# Patient Record
Sex: MALE | Race: WHITE | NOT HISPANIC OR LATINO | Employment: OTHER | URBAN - METROPOLITAN AREA
[De-identification: names, ages, dates, MRNs, and addresses within clinical notes are randomized per-mention and may not be internally consistent; named-entity substitution may affect disease eponyms.]

---

## 2018-07-26 ENCOUNTER — HOSPITAL PATHOLOGY (OUTPATIENT)
Dept: OTHER | Facility: CLINIC | Age: 78
End: 2018-07-26

## 2018-07-27 LAB — COPATH REPORT: NORMAL

## 2021-08-22 ENCOUNTER — HOSPITAL ENCOUNTER (EMERGENCY)
Facility: CLINIC | Age: 81
Discharge: HOME | End: 2021-08-22
Attending: PHYSICIAN ASSISTANT | Admitting: PHYSICIAN ASSISTANT
Payer: MEDICARE

## 2021-08-22 ENCOUNTER — APPOINTMENT (OUTPATIENT)
Dept: CT IMAGING | Facility: CLINIC | Age: 81
End: 2021-08-22
Attending: EMERGENCY MEDICINE
Payer: MEDICARE

## 2021-08-22 VITALS
OXYGEN SATURATION: 95 % | SYSTOLIC BLOOD PRESSURE: 142 MMHG | TEMPERATURE: 97.2 F | DIASTOLIC BLOOD PRESSURE: 93 MMHG | HEART RATE: 118 BPM | RESPIRATION RATE: 28 BRPM

## 2021-08-22 DIAGNOSIS — I48.92 ATRIAL FLUTTER (H): ICD-10-CM

## 2021-08-22 DIAGNOSIS — R07.9 CHEST PAIN: ICD-10-CM

## 2021-08-22 LAB
ALBUMIN SERPL-MCNC: 3.8 G/DL (ref 3.4–5)
ALP SERPL-CCNC: 65 U/L (ref 40–150)
ALT SERPL W P-5'-P-CCNC: 19 U/L (ref 0–70)
ANION GAP SERPL CALCULATED.3IONS-SCNC: 4 MMOL/L (ref 3–14)
AST SERPL W P-5'-P-CCNC: 13 U/L (ref 0–45)
BASOPHILS # BLD MANUAL: 0 10E3/UL (ref 0–0.2)
BASOPHILS NFR BLD MANUAL: 0 %
BILIRUB SERPL-MCNC: 1.3 MG/DL (ref 0.2–1.3)
BUN SERPL-MCNC: 15 MG/DL (ref 7–30)
CALCIUM SERPL-MCNC: 8.8 MG/DL (ref 8.5–10.1)
CHLORIDE BLD-SCNC: 107 MMOL/L (ref 94–109)
CO2 SERPL-SCNC: 28 MMOL/L (ref 20–32)
CREAT SERPL-MCNC: 0.99 MG/DL (ref 0.66–1.25)
EOSINOPHIL # BLD MANUAL: 0.6 10E3/UL (ref 0–0.7)
EOSINOPHIL NFR BLD MANUAL: 4 %
ERYTHROCYTE [DISTWIDTH] IN BLOOD BY AUTOMATED COUNT: 13.5 % (ref 10–15)
GFR SERPL CREATININE-BSD FRML MDRD: 72 ML/MIN/1.73M2
GLUCOSE BLD-MCNC: 136 MG/DL (ref 70–99)
HCT VFR BLD AUTO: 42.5 % (ref 40–53)
HGB BLD-MCNC: 14.4 G/DL (ref 13.3–17.7)
HOLD SPECIMEN: NORMAL
LIPASE SERPL-CCNC: 91 U/L (ref 73–393)
LYMPHOCYTES # BLD MANUAL: 2.2 10E3/UL (ref 0.8–5.3)
LYMPHOCYTES NFR BLD MANUAL: 14 %
MAGNESIUM SERPL-MCNC: 1.8 MG/DL (ref 1.6–2.3)
MCH RBC QN AUTO: 34.8 PG (ref 26.5–33)
MCHC RBC AUTO-ENTMCNC: 33.9 G/DL (ref 31.5–36.5)
MCV RBC AUTO: 103 FL (ref 78–100)
MONOCYTES # BLD MANUAL: 0.5 10E3/UL (ref 0–1.3)
MONOCYTES NFR BLD MANUAL: 3 %
NEUTROPHILS # BLD MANUAL: 12.2 10E3/UL (ref 1.6–8.3)
NEUTROPHILS NFR BLD MANUAL: 79 %
NT-PROBNP SERPL-MCNC: 565 PG/ML (ref 0–1800)
PLAT MORPH BLD: ABNORMAL
PLATELET # BLD AUTO: 205 10E3/UL (ref 150–450)
POTASSIUM BLD-SCNC: 3.9 MMOL/L (ref 3.4–5.3)
PROT SERPL-MCNC: 7.6 G/DL (ref 6.8–8.8)
RBC # BLD AUTO: 4.14 10E6/UL (ref 4.4–5.9)
RBC MORPH BLD: ABNORMAL
SARS-COV-2 RNA RESP QL NAA+PROBE: NEGATIVE
SODIUM SERPL-SCNC: 139 MMOL/L (ref 133–144)
TROPONIN I SERPL-MCNC: <0.015 UG/L (ref 0–0.04)
TSH SERPL DL<=0.005 MIU/L-ACNC: 3.56 MU/L (ref 0.4–4)
WBC # BLD AUTO: 15.4 10E3/UL (ref 4–11)

## 2021-08-22 PROCEDURE — 36415 COLL VENOUS BLD VENIPUNCTURE: CPT | Performed by: PHYSICIAN ASSISTANT

## 2021-08-22 PROCEDURE — 93005 ELECTROCARDIOGRAM TRACING: CPT

## 2021-08-22 PROCEDURE — 250N000013 HC RX MED GY IP 250 OP 250 PS 637: Performed by: PHYSICIAN ASSISTANT

## 2021-08-22 PROCEDURE — 85027 COMPLETE CBC AUTOMATED: CPT | Performed by: PHYSICIAN ASSISTANT

## 2021-08-22 PROCEDURE — 83735 ASSAY OF MAGNESIUM: CPT | Performed by: PHYSICIAN ASSISTANT

## 2021-08-22 PROCEDURE — 83690 ASSAY OF LIPASE: CPT | Performed by: PHYSICIAN ASSISTANT

## 2021-08-22 PROCEDURE — 83880 ASSAY OF NATRIURETIC PEPTIDE: CPT | Performed by: EMERGENCY MEDICINE

## 2021-08-22 PROCEDURE — 84484 ASSAY OF TROPONIN QUANT: CPT | Performed by: PHYSICIAN ASSISTANT

## 2021-08-22 PROCEDURE — C9803 HOPD COVID-19 SPEC COLLECT: HCPCS

## 2021-08-22 PROCEDURE — 80053 COMPREHEN METABOLIC PANEL: CPT | Performed by: PHYSICIAN ASSISTANT

## 2021-08-22 PROCEDURE — 74177 CT ABD & PELVIS W/CONTRAST: CPT

## 2021-08-22 PROCEDURE — 250N000011 HC RX IP 250 OP 636: Performed by: PHYSICIAN ASSISTANT

## 2021-08-22 PROCEDURE — 99285 EMERGENCY DEPT VISIT HI MDM: CPT | Mod: 25

## 2021-08-22 PROCEDURE — 87635 SARS-COV-2 COVID-19 AMP PRB: CPT | Performed by: PHYSICIAN ASSISTANT

## 2021-08-22 PROCEDURE — 250N000009 HC RX 250: Performed by: PHYSICIAN ASSISTANT

## 2021-08-22 PROCEDURE — 84443 ASSAY THYROID STIM HORMONE: CPT | Performed by: PHYSICIAN ASSISTANT

## 2021-08-22 RX ORDER — METOPROLOL TARTRATE 25 MG/1
25 TABLET, FILM COATED ORAL 2 TIMES DAILY
Qty: 60 TABLET | Refills: 0 | Status: ON HOLD | OUTPATIENT
Start: 2021-08-22 | End: 2021-10-04

## 2021-08-22 RX ORDER — IOPAMIDOL 755 MG/ML
500 INJECTION, SOLUTION INTRAVASCULAR ONCE
Status: COMPLETED | OUTPATIENT
Start: 2021-08-22 | End: 2021-08-22

## 2021-08-22 RX ORDER — NITROGLYCERIN 0.4 MG/1
0.4 TABLET SUBLINGUAL EVERY 5 MIN PRN
Status: DISCONTINUED | OUTPATIENT
Start: 2021-08-22 | End: 2021-08-23 | Stop reason: HOSPADM

## 2021-08-22 RX ORDER — ASPIRIN 81 MG/1
162 TABLET, CHEWABLE ORAL ONCE
Status: DISCONTINUED | OUTPATIENT
Start: 2021-08-22 | End: 2021-08-22

## 2021-08-22 RX ADMIN — SODIUM CHLORIDE 90 ML: 9 INJECTION, SOLUTION INTRAVENOUS at 19:40

## 2021-08-22 RX ADMIN — NITROGLYCERIN 0.4 MG: 0.4 TABLET SUBLINGUAL at 18:30

## 2021-08-22 RX ADMIN — IOPAMIDOL 73 ML: 755 INJECTION, SOLUTION INTRAVENOUS at 19:38

## 2021-08-22 ASSESSMENT — ENCOUNTER SYMPTOMS
CHILLS: 1
VOMITING: 0
NAUSEA: 0
PALPITATIONS: 1
ABDOMINAL PAIN: 0
DIARRHEA: 0
SHORTNESS OF BREATH: 0
COUGH: 1
FEVER: 0

## 2021-08-22 NOTE — ED TRIAGE NOTES
Patient presents to the ED reporting chest pain. Began today. Currently rates 3/10. States has been under a lot of stress recently.

## 2021-08-22 NOTE — ED PROVIDER NOTES
Emergency Department Attending Supervision Note  8/22/2021  6:23 PM      I evaluated this patient in conjunction with VAHID Levin      Mark Shultz is a 80 year old male with history of diabetes mellitus, hyperlipidemia, hypertension who presents with epigastric pain, cough and palpitations.       On my exam  General: Patient is awake, alert and interactive when I enter the room  Head: The scalp, face, and head appear normal  Neck: Normal range of motion.   CV: Tachycardic in an irregularly irregular rhythm.  Resp: Lungs are clear without wheezes or rales. No respiratory distress.   GI: Abdomen is soft, no rigidity, guarding, or rebound. No distension. No tenderness to palpation in any quadrant.     MS: Normal tone. Joints grossly normal without effusions. No asymmetric leg swelling, calf or thigh tenderness.    Skin: No rash or lesions noted. Normal capillary refill noted  Neuro: Speech is normal and fluent. Face is symmetric. Moving all extremities.   Psych:  Normal affect.  Appropriate interactions.      Results:  Labs Ordered and Resulted from Time of ED Arrival Up to the Time of Departure from the ED   COMPREHENSIVE METABOLIC PANEL - Abnormal; Notable for the following components:       Result Value    Glucose 136 (*)     All other components within normal limits   CBC WITH PLATELETS AND DIFFERENTIAL - Abnormal; Notable for the following components:    WBC Count 15.4 (*)     RBC Count 4.14 (*)      (*)     MCH 34.8 (*)     All other components within normal limits   DIFFERENTIAL - Abnormal; Notable for the following components:    Absolute Neutrophils 12.2 (*)     All other components within normal limits   TROPONIN I - Normal   LIPASE - Normal   TSH WITH FREE T4 REFLEX - Normal   MAGNESIUM - Normal   COVID-19 VIRUS (CORONAVIRUS) BY PCR - Normal    Narrative:     Testing was performed using the patricia  SARS-CoV-2 & Influenza A/B Assay on the patricia  Shirley  System.  This test should be ordered for the  detection of SARS-COV-2 in individuals who meet SARS-CoV-2 clinical and/or epidemiological criteria. Test performance is unknown in asymptomatic patients.  This test is for in vitro diagnostic use under the FDA EUA for laboratories certified under CLIA to perform moderate and/or high complexity testing. This test has not been FDA cleared or approved.  A negative test does not rule out the presence of PCR inhibitors in the specimen or target RNA in concentration below the limit of detection for the assay. The possibility of a false negative should be considered if the patient's recent exposure or clinical presentation suggests COVID-19.  [facility] are certified under the Clinical Laboratory Improvement Amendments of 1988 (CLIA-88) as qualified to perform moderate and/or high complexity laboratory testing.   NT PROBNP INPATIENT - Normal   CBC WITH PLATELETS & DIFFERENTIAL    Narrative:     The following orders were created for panel order CBC with platelets differential.  Procedure                               Abnormality         Status                     ---------                               -----------         ------                     CBC with platelets and d...[[id number]]  Abnormal            Final result               Manual Differential[[id number]]          Abnormal            Final result                 Please view results for these tests on the individual orders.   EXTRA RED TOP TUBE   EXTRA GREEN TOP (LITHIUM HEPARIN) TUBE   EXTRA GREEN TOP (LITHIUM HEPARIN) TUBE   EXTRA PURPLE TOP TUBE   EXTRA BLOOD BANK PURPLE TOP TUBE   EXTRA BLUE TOP TUBE   PULSE OXIMETRY NURSING   CARDIAC CONTINUOUS MONITORING   PERIPHERAL IV CATHETER   EXTRA TUBE    Narrative:     The following orders were created for panel order Extra Tube ([place] Draw).  Procedure                               Abnormality         Status                     ---------                               -----------         ------                      Extra Red Top Tube[[id number]]                               Final result               Extra Green Top (Lithium...[[id number]]                      Final result               Extra Green Top (Lithium...[[id number]]                      Final result               Extra Purple Top Tube[[id number]]                            Final result               Extra Blood Bank Purple ...[[id number]]                      Final result                 Please view results for these tests on the individual orders.   EXTRA TUBE    Narrative:     The following orders were created for panel order Extra Tube.  Procedure                               Abnormality         Status                     ---------                               -----------         ------                     Extra Blue Top Tube[[id number]]                              Final result                 Please view results for these tests on the individual orders.        My impression   Mark is a 80-year-old gentleman who presents emergency department with several complaints including chest/epigastric pain, shortness of breath and palpitations. Patient was ultimately found to have new onset a flutter with rapid ventricular response. No evidence of acute ischemia however he does have some findings concerning for early heart failure with mild edema on CT study. Patient required oxygen for a brief period of time to maintain his saturations above 90%. Leo discussed on multiple occasions need for admission for further management of his A. fib and likely early congestive heart failure. However he declined on multiple occasions. I also spoke with the patient regarding benefits of admission and my strong recommendation for further medical management of his atrial fibrillation with RVR and pulmonary edema. However the patient on multiple occasions adamantly declined stating that he has to get home to take care of his wife. I offered other means for his wife to  receive care but the patient continued to decline. Mark Shultz has made the decision to leave the current treatment against medical advice.  He has been told that his symptoms may possibly be caused by atrial flutter. He has been informed and understands the inherent risks, including death, permanent disability or death.  He has decided to accept the responsibility for his decision.  He has the capacity to make this informed decision.  He is alert and oriented x 3, understands these instructions, and is able to ambulate.  Mark Shultz and all necessary parties have been advised that they may return at any time for further evaluation or treatment. I will start the patient on Xarelto for anticoagulation and start him on metoprolol for rate control. I advised him to return to the emergency department with any new or worsening symptoms and follow-up very closely with his primary care physician.    Diagnosis    ICD-10-CM    1. Atrial flutter (H)  I48.92    2. Chest pain  R07.9          Arnold Low MD Battista, Christopher Joseph, MD  08/23/21 0009

## 2021-08-22 NOTE — ED PROVIDER NOTES
History   Chief Complaint:  Chest Pain    HPI   Mark Shultz is a 80 year old male with history of diabetes mellitus, hyperlipidemia, hypertension who presents with chest pain pain. Patient states he was sitting in a chair about 25 minutes ago when he developed an intense pain in his lower sternal region that lasted about 5 minutes. The pain is currently a 3/10. He has had some palpitations and pain down his left arm over the last couple days. He does mention that he has been battling a cough for the last couple weeks which he had a chest x-ray at his primary care doctor and it was negative for any lung concerns. He is also experiencing chills. He often gets vertigo and felt it coming on 2 days ago so he took his Meclizine which helped. Today he took 3 aspirin, 1 this morning and 2 after developing pain. He denies change from baseline abdominal pain. He denies fever, shortness of breath, vomiting, nausea, diarrhea, or leg swelling. He denies history of arrhythmia. Patient does note he was recently on antibiotics for a testicular infection.     Review of Systems   Constitutional: Positive for chills. Negative for fever.   Respiratory: Positive for cough. Negative for shortness of breath.    Cardiovascular: Positive for chest pain and palpitations. Negative for leg swelling.   Gastrointestinal: Negative for abdominal pain, diarrhea, nausea and vomiting.   All other systems reviewed and are negative.      Allergies:  Tylenol W/Codeine [Acetaminophen-Codeine]    Medications:  Aspirin 81 mg  Metformin  Lisinopril-hydrochlorothiazide  Zocor  Levothyroxine    Past Medical History:    Benign prostatic hyperplasia  Diabetes mellitus  Hyperlipidemia   Hypertension   Hypothyroidism  Lumbago  Left ventricular hypertrophy  Obstructive sleep apnea   Peripheral neuropathy    Past Surgical History:    Hernia repair  Appendectomy     Family History:    Mother- asthma  Sister- COPD  Brother- diabetes mellitus, thyroid disease,  coronary artery disease   Father- cancer    Social History:  Presents with his wife      Physical Exam     Patient Vitals for the past 24 hrs:   BP Temp Pulse Resp SpO2   08/22/21 2140 (!) 142/93 -- 118 28 95 %   08/22/21 2100 (!) 156/91 -- 101 14 99 %   08/22/21 2030 131/82 -- 71 -- 98 %   08/22/21 2015 137/84 -- 56 -- 96 %   08/22/21 2000 (!) 143/91 -- 91 -- 98 %   08/22/21 1915 -- -- 85 18 96 %   08/22/21 1900 (!) 149/92 -- 87 27 96 %   08/22/21 1850 (!) 145/93 -- 91 21 92 %   08/22/21 1830 (!) 141/90 -- 85 24 96 %   08/22/21 1800 (!) 192/117 -- 105 24 96 %   08/22/21 1756 (!) 192/117 97.2  F (36.2  C) 100 18 96 %       Physical Exam  Constitutional: Pleasant. Cooperative.   Eyes: Pupils equally round and reactive  HENT: Head is normal in appearance. Oropharynx is normal with moist mucus membranes.  Cardiovascular: Regular rate and rhythm and without murmurs.  Respiratory: Normal respiratory effort, lungs are clear bilaterally.  GI: Abdomen is soft, non-tender, non-distended. No guarding, rebound, or rigidity.  Musculoskeletal: No asymmetry of the lower extremities, no tenderness to palpation.   Skin: Normal, without rash.  Neurologic: Cranial nerves grossly intact, normal cognition, no focal deficits. Alert and oriented x 3.   Psychiatric: Normal affect.  Nursing notes and vital signs reviewed.    Emergency Department Course   ECG  ECG taken at 1754, ECG read at 1801  Chest pain    New atrial flutter as compared to prior, dated 6/10/15.  Rate 94 bpm. NV interval * ms. QRS duration 76 ms. QT/QTc 342/427 ms. P-R-T axes 83 -13 67. Atrial flutter with variable AV block with premature ventricular or aberrantly conducted complexes.      Imaging:  CT Chest Pulmonary Embolism w Contrast  1.  No pulmonary embolism. There is enlargement of the central pulmonary arteries compatible with an element of pulmonary arterial hypertension. Mild groundglass and interstitial infiltrates in the lung bases, likely mild  edema.  2.  Prostatic enlargement. There is distention of the urinary bladder with bladder wall trabeculation and small diverticula formation involving the dome compatible with outlet obstruction.   3.  Small fat-containing right inguinal hernia.  As read by Radiology.     Laboratory:  CBC: WBC 15.4 (H), HGB 14.4,   CMP: glucose 136 (H) o/w WNL (Creatinine 0.99)  Lipase: 91     Symptomatic COVID-19 Virus (Coronavirus) by PCR: negative    Troponin (Collected 1814): <0.015     TSH: 3.56    Magnesium: 1.8    BNP: 565    Emergency Department Course:    Reviewed:  I reviewed nursing notes, vitals, past medical history and care everywhere    Assessments:  1805 I obtained history and examined the patient as noted above.   2110 I rechecked the patient and explained findings.     Consults:   1830 I staffed the patient with Dr. Low.     Interventions:  1755: Aspirin 162 mg PO   1830: Nitroglycerin 0.4 mg sublingual       Disposition:  The patient left AMA.    Impression & Plan     Medical Decision Making:  Mark Shultz is a 80 year old male who presents to the ED for evaluation of chest pain, cough, dizziness, and palpitations. Chest pain developed earlier today, prompting presentation to the ED. Remainder of symptoms have been ongoing for the past few days/weeks. See HPI as above for additional details. Vitals and physical exam as above. DDx was broad and included ACS, dissection, PE, PTX, GERD, MSK, PNA, among others. Work up as above. ECG notable for new onset atrial flutter. Chest CT does additionally note some mild edema to bilateral lung bases and BNP returns at 565. Remainder of work up as above. Discussed with patient indication for admission given above work up. Patient adamantly refuses admission, noting that he is sole caregiver for his wife at home. We discussed risks associated with leaving AGAINST MEDICAL ADVICE, including up to death, and patient notes understanding and continues to refuse  admission. In this setting, will initiate patient on metoprolol for rate control and additional control of HTN, as well as Xarelto given Aflutter. Patient understands importance of initiating these medications. He will call his PCP for further evaluation immediately. He will have a very low threshold to return to the emergency department. All questions answered. Patient left emergency department AMA.    Diagnosis:    ICD-10-CM    1. Atrial flutter (H)  I48.92    2. Chest pain  R07.9        Discharge Medications:  Discharge Medication List as of 8/22/2021  9:58 PM      START taking these medications    Details   metoprolol tartrate (LOPRESSOR) 25 MG tablet Take 1 tablet (25 mg) by mouth 2 times daily, Disp-60 tablet, R-0, Local Print      rivaroxaban ANTICOAGULANT (XARELTO ANTICOAGULANT) 20 MG TABS tablet Take 1 tablet (20 mg) by mouth daily (with dinner), Disp-30 tablet, R-0, Local Print             Scribe Disclosure:  I, Johanne Trejo, am serving as a scribe at 5:51 PM on 8/22/2021 to document services personally performed by Leo Beckwith PA-C based on my observations and the provider's statements to me.     This record was created at least in part using electronic voice recognition software, so please excuse any typographical errors.            Leo Beckwith PA-C  08/23/21 0050

## 2021-08-23 LAB
ATRIAL RATE - MUSE: 264 BPM
DIASTOLIC BLOOD PRESSURE - MUSE: NORMAL MMHG
INTERPRETATION ECG - MUSE: NORMAL
P AXIS - MUSE: 83 DEGREES
PR INTERVAL - MUSE: NORMAL MS
QRS DURATION - MUSE: 76 MS
QT - MUSE: 342 MS
QTC - MUSE: 427 MS
R AXIS - MUSE: -13 DEGREES
SYSTOLIC BLOOD PRESSURE - MUSE: NORMAL MMHG
T AXIS - MUSE: 67 DEGREES
VENTRICULAR RATE- MUSE: 94 BPM

## 2021-08-23 NOTE — DISCHARGE INSTRUCTIONS
You are leaving the emergency department against medical advice.  Given your new onset heart arrhythmia, we will start you on a low dose medication to control your heart rate. This medication will also help to decrease your blood pressure. Monitor your blood pressures daily since you will be starting on a new medication.  Your new heart arrhythmia puts you at risk for forming clots, and can therefore increase your risks for stroke. We will start you on a blood thinner called Xarelto. Discontinue your daily aspirin while taking the Xarelto.    HAVE A VERY LOW THRESHOLD TO RETURN TO THE EMERGENCY DEPARTMENT WITH CHEST PAIN OR ANY OTHER CONCERNS.

## 2021-09-25 ENCOUNTER — HOSPITAL ENCOUNTER (EMERGENCY)
Facility: CLINIC | Age: 81
Discharge: HOME | DRG: 177 | End: 2021-09-25
Attending: EMERGENCY MEDICINE | Admitting: EMERGENCY MEDICINE
Payer: MEDICARE

## 2021-09-25 ENCOUNTER — APPOINTMENT (OUTPATIENT)
Dept: GENERAL RADIOLOGY | Facility: CLINIC | Age: 81
DRG: 177 | End: 2021-09-25
Attending: EMERGENCY MEDICINE
Payer: MEDICARE

## 2021-09-25 VITALS
RESPIRATION RATE: 22 BRPM | HEART RATE: 91 BPM | SYSTOLIC BLOOD PRESSURE: 139 MMHG | TEMPERATURE: 98.9 F | OXYGEN SATURATION: 95 % | DIASTOLIC BLOOD PRESSURE: 76 MMHG

## 2021-09-25 DIAGNOSIS — R07.9 CHEST PAIN, UNSPECIFIED TYPE: ICD-10-CM

## 2021-09-25 DIAGNOSIS — I50.9 ACUTE ON CHRONIC CONGESTIVE HEART FAILURE, UNSPECIFIED HEART FAILURE TYPE (H): ICD-10-CM

## 2021-09-25 LAB
ALBUMIN SERPL-MCNC: 3.3 G/DL (ref 3.4–5)
ALBUMIN SERPL-MCNC: 3.4 G/DL (ref 3.4–5)
ALP SERPL-CCNC: 52 U/L (ref 40–150)
ALP SERPL-CCNC: 55 U/L (ref 40–150)
ALT SERPL W P-5'-P-CCNC: 16 U/L (ref 0–70)
ALT SERPL W P-5'-P-CCNC: 16 U/L (ref 0–70)
ANION GAP SERPL CALCULATED.3IONS-SCNC: 4 MMOL/L (ref 3–14)
AST SERPL W P-5'-P-CCNC: 12 U/L (ref 0–45)
AST SERPL W P-5'-P-CCNC: 13 U/L (ref 0–45)
BASOPHILS # BLD AUTO: 0 10E3/UL (ref 0–0.2)
BASOPHILS NFR BLD AUTO: 1 %
BILIRUB DIRECT SERPL-MCNC: 0.3 MG/DL (ref 0–0.2)
BILIRUB SERPL-MCNC: 1.1 MG/DL (ref 0.2–1.3)
BILIRUB SERPL-MCNC: 1.2 MG/DL (ref 0.2–1.3)
BUN SERPL-MCNC: 16 MG/DL (ref 7–30)
CALCIUM SERPL-MCNC: 8.6 MG/DL (ref 8.5–10.1)
CHLORIDE BLD-SCNC: 107 MMOL/L (ref 94–109)
CO2 SERPL-SCNC: 28 MMOL/L (ref 20–32)
CREAT SERPL-MCNC: 1 MG/DL (ref 0.66–1.25)
EOSINOPHIL # BLD AUTO: 0.1 10E3/UL (ref 0–0.7)
EOSINOPHIL NFR BLD AUTO: 1 %
ERYTHROCYTE [DISTWIDTH] IN BLOOD BY AUTOMATED COUNT: 13.7 % (ref 10–15)
GFR SERPL CREATININE-BSD FRML MDRD: 70 ML/MIN/1.73M2
GLUCOSE BLD-MCNC: 86 MG/DL (ref 70–99)
HCT VFR BLD AUTO: 39.4 % (ref 40–53)
HGB BLD-MCNC: 13.2 G/DL (ref 13.3–17.7)
HOLD SPECIMEN: NORMAL
IMM GRANULOCYTES # BLD: 0 10E3/UL
IMM GRANULOCYTES NFR BLD: 0 %
LIPASE SERPL-CCNC: 104 U/L (ref 73–393)
LYMPHOCYTES # BLD AUTO: 2.1 10E3/UL (ref 0.8–5.3)
LYMPHOCYTES NFR BLD AUTO: 26 %
MCH RBC QN AUTO: 33.5 PG (ref 26.5–33)
MCHC RBC AUTO-ENTMCNC: 33.5 G/DL (ref 31.5–36.5)
MCV RBC AUTO: 100 FL (ref 78–100)
MONOCYTES # BLD AUTO: 0.9 10E3/UL (ref 0–1.3)
MONOCYTES NFR BLD AUTO: 11 %
NEUTROPHILS # BLD AUTO: 4.9 10E3/UL (ref 1.6–8.3)
NEUTROPHILS NFR BLD AUTO: 61 %
NRBC # BLD AUTO: 0 10E3/UL
NRBC BLD AUTO-RTO: 0 /100
NT-PROBNP SERPL-MCNC: 1800 PG/ML (ref 0–1800)
PLATELET # BLD AUTO: 163 10E3/UL (ref 150–450)
POTASSIUM BLD-SCNC: 3.7 MMOL/L (ref 3.4–5.3)
PROT SERPL-MCNC: 7.3 G/DL (ref 6.8–8.8)
PROT SERPL-MCNC: 7.3 G/DL (ref 6.8–8.8)
RBC # BLD AUTO: 3.94 10E6/UL (ref 4.4–5.9)
SARS-COV-2 RNA RESP QL NAA+PROBE: POSITIVE
SODIUM SERPL-SCNC: 139 MMOL/L (ref 133–144)
TROPONIN I SERPL-MCNC: 0.01 UG/L (ref 0–0.04)
TROPONIN I SERPL-MCNC: 0.02 UG/L (ref 0–0.04)
WBC # BLD AUTO: 8 10E3/UL (ref 4–11)

## 2021-09-25 PROCEDURE — 83880 ASSAY OF NATRIURETIC PEPTIDE: CPT | Performed by: EMERGENCY MEDICINE

## 2021-09-25 PROCEDURE — 82248 BILIRUBIN DIRECT: CPT | Performed by: EMERGENCY MEDICINE

## 2021-09-25 PROCEDURE — 84484 ASSAY OF TROPONIN QUANT: CPT | Mod: 91 | Performed by: EMERGENCY MEDICINE

## 2021-09-25 PROCEDURE — 80053 COMPREHEN METABOLIC PANEL: CPT | Performed by: EMERGENCY MEDICINE

## 2021-09-25 PROCEDURE — 83690 ASSAY OF LIPASE: CPT | Performed by: EMERGENCY MEDICINE

## 2021-09-25 PROCEDURE — 99285 EMERGENCY DEPT VISIT HI MDM: CPT | Mod: 25

## 2021-09-25 PROCEDURE — 250N000011 HC RX IP 250 OP 636: Performed by: EMERGENCY MEDICINE

## 2021-09-25 PROCEDURE — 36415 COLL VENOUS BLD VENIPUNCTURE: CPT | Performed by: EMERGENCY MEDICINE

## 2021-09-25 PROCEDURE — U0003 INFECTIOUS AGENT DETECTION BY NUCLEIC ACID (DNA OR RNA); SEVERE ACUTE RESPIRATORY SYNDROME CORONAVIRUS 2 (SARS-COV-2) (CORONAVIRUS DISEASE [COVID-19]), AMPLIFIED PROBE TECHNIQUE, MAKING USE OF HIGH THROUGHPUT TECHNOLOGIES AS DESCRIBED BY CMS-2020-01-R: HCPCS | Performed by: EMERGENCY MEDICINE

## 2021-09-25 PROCEDURE — 93005 ELECTROCARDIOGRAM TRACING: CPT

## 2021-09-25 PROCEDURE — 96374 THER/PROPH/DIAG INJ IV PUSH: CPT

## 2021-09-25 PROCEDURE — 84484 ASSAY OF TROPONIN QUANT: CPT | Performed by: EMERGENCY MEDICINE

## 2021-09-25 PROCEDURE — 85025 COMPLETE CBC W/AUTO DIFF WBC: CPT | Performed by: EMERGENCY MEDICINE

## 2021-09-25 PROCEDURE — C9803 HOPD COVID-19 SPEC COLLECT: HCPCS

## 2021-09-25 PROCEDURE — 71045 X-RAY EXAM CHEST 1 VIEW: CPT

## 2021-09-25 RX ORDER — FUROSEMIDE 10 MG/ML
40 INJECTION INTRAMUSCULAR; INTRAVENOUS ONCE
Status: COMPLETED | OUTPATIENT
Start: 2021-09-25 | End: 2021-09-25

## 2021-09-25 RX ORDER — FUROSEMIDE 40 MG
40 TABLET ORAL DAILY
Qty: 2 TABLET | Refills: 0 | Status: SHIPPED | OUTPATIENT
Start: 2021-09-25 | End: 2021-09-28

## 2021-09-25 RX ADMIN — FUROSEMIDE 40 MG: 10 INJECTION, SOLUTION INTRAVENOUS at 03:30

## 2021-09-25 ASSESSMENT — ENCOUNTER SYMPTOMS
FEVER: 0
FATIGUE: 1
VOMITING: 0
DIAPHORESIS: 1
NAUSEA: 0
ABDOMINAL PAIN: 0
BLOOD IN STOOL: 0

## 2021-09-25 NOTE — ED NOTES
"Pt ambulated back to room from bathroom and was SOB, tachypneic,  and tachycardic. PT states, \"ever since I turned 80 years old this happens.\" Oxygen staturations WNL on RA. Frequent cough noted.   "

## 2021-09-25 NOTE — ED TRIAGE NOTES
Pt to ED via EMS from home with c/o upper abd pain/pressure/indigestion and fatigue. Pt reports he woke up feeling feverish and diaphoretic 1 hr ago. EMS reports that pt had similar symptoms 3 weeks ago and was DX with afib. Pt denies CP/SOB/HA/body aches. Chronic cough x 1 year, denies any recent worsening of cough.     IV started PTA. Pt denies pain at this time. Denies current ABD pain.

## 2021-09-25 NOTE — ED PROVIDER NOTES
History   Chief Complaint:  Fatigue and Abdominal Pain       HPI   Mark Shultz is a 81 year old male with history of hypertension and atrial fibrillation, currently on xarelto, who presents with fatigue and chest pressure that started about 3 hours ago. He reports he was laying in bed and developed a L. Sided nonradiating pain that was more sharp.  He reports associated sweating with it. Reports that he had similar symptoms about 3 weeks ago and diagnosed with atrial fibrillation.  At bedside he denies any currently complaints. Denies nausea, vomiting, dyspnea, black/bloody stools, abdominal pain, or measured fever. He reports a chronic nonproductive cough, though states this has been ongoing for over a year. He is COVID vaccinated.  No history sick contacts/blood clots/cancer.    Review of Systems   Constitutional: Positive for diaphoresis (since resolved) and fatigue. Negative for fever.   Cardiovascular: Positive for chest pain (since resolved).   Gastrointestinal: Negative for abdominal pain, blood in stool, nausea and vomiting.   All other systems reviewed and are negative.    Allergies:  Codeine  Shellfish Allergy  Tylenol W/Codeine [Acetaminophen-Codeine]  Iodinated Contrast Media  Prednisone    Medications:  Lopressor  Xarelto  Cozaar  Glucotrol  Synthroid  Antivert  Zocor  Glucophage  Flomax  Tradjenta    Past Medical History:    BPH  Diabetes  Hypertension  Hypothyroid  LVH  Peripheral neuropathy  Atrial fibrillation    Past Surgical History:    Hernia repair  Appendectomy    Family History:    Mother: Asthma  Sister: COPD  Brother: Thyroid disease, type 2 diabetes, CAD  Father: Cancer    Social History:  Patient presents to the ED alone.  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  Denies ETOH/smoking    Physical Exam     Patient Vitals for the past 24 hrs:   BP Temp Temp src Pulse Resp SpO2   09/25/21 0455 -- -- -- 89 14 93 %   09/25/21 0450 (!) 140/96 -- -- -- 23 92 %   09/25/21 0430 (!) 154/91 -- -- 72 -- 92 %    09/25/21 0420 (!) 158/91 -- -- 87 22 94 %   09/25/21 0350 (!) 176/95 -- -- 68 13 90 %   09/25/21 0330 (!) 170/101 -- -- 87 20 94 %   09/25/21 0320 (!) 171/92 -- -- 67 17 92 %   09/25/21 0300 138/89 -- -- 68 -- 92 %   09/25/21 0250 (!) 147/81 -- -- 86 -- --   09/25/21 0240 -- -- -- -- -- 95 %   09/25/21 0230 -- -- -- -- -- 95 %   09/25/21 0220 -- -- -- -- -- 93 %   09/25/21 0215 (!) 151/95 -- -- 80 -- --   09/25/21 0200 (!) 152/96 -- -- 71 -- 93 %   09/25/21 0145 (!) 154/86 -- -- 65 25 95 %   09/25/21 0127 -- -- -- 96 13 90 %   09/25/21 0125 -- -- -- 97 15 94 %   09/25/21 0124 -- -- -- -- -- 93 %   09/25/21 0123 -- -- -- -- -- 94 %   09/25/21 0116 -- -- -- -- -- 95 %   09/25/21 0115 (!) 159/107 -- -- 93 -- --   09/25/21 0114 -- 98.9  F (37.2  C) Oral -- -- 95 %       Physical Exam  Nursing note and vitals reviewed.  Constitutional: Well nourished. Resting comfortably.   Eyes: Conjunctiva normal.  Pupils are equal, round, and reactive to light.   ENT: Nose normal. Mucous membranes pink and moist.    Neck: Normal range of motion.  CVS: Irregular rhythm, rate controlled  Pulmonary: Lungs with bibasilar rales  GI: Abdomen soft. Nontender, nondistended. No rigidity or guarding.    MSK: No calf tenderness; trace LE edema  Neuro: Alert. Follows simple commands.  Skin: Skin is warm and dry. No rash noted.   Psychiatric: Normal affect.       Emergency Department Course   ECG  ECG taken at 0112, ECG read at 0111  Atrial flutter with variable AV block. Left axis deviation. Anteroseptal infarct, age undetermined. Inferior injury pattern. Abnormal ECG.   Rate 92 bpm. OR interval * ms. QRS duration 64 ms. QT/QTc 372/460 ms. P-R-T axes * -34 64.     Imaging:  XR Chest, Portable, G/E 1 view:   Enlarged heart. Mild prominence of the interstitial markings. No judy congestive failure. No focal consolidation, pleural effusion or pneumothorax. As per radiology.    Laboratory:   CBC: WBC: 8.0, HGB: 13.2 (L), PLT: 163    CMP: Glucose 86  o/w WNL (Creatinine: 1.00)    Hepatic Panel: Bilirubin Direct: 0.3 (H), Albumin: 3.3 (L), o/w Negative    Troponin (Collected 0126): <0.015    Troponin (Collected 0403): 0.016    Lipase: 104    BNP: 1,800    Symptomatic COVID-19 PCR: pending    Emergency Department Course:    Reviewed:  I reviewed nursing notes, vitals, past medical history and care everywhere    Assessments:  0217 I obtained history and examined the patient as noted above.     0509 I rechecked the patient and explained findings.     Interventions:  0330 Lasix 40 mg IV    Disposition:  The patient was discharged to home.     Impression & Plan     Medical Decision Making:  Patient is a 81-year-old male presenting with reported chest pain though resolved upon arrival.  He is nontoxic, in no significant distress on arrival.  He underwent an extensive work-up during his time in the ED.  EKG with atrial flutter though no ischemic changes.  Screening troponin negative x2.  He remained chest pain-free during his time in the ED.  Labs reviewed, no evidence to suggest underlying sepsis, profound anemia or significant electrolyte derangement.  BNP is quite elevated and I do have concern for mild CHF exacerbation today.  Chest x-ray with cardiomegaly though no obvious pneumonia or significant fluid overload.  Patient was gently diuresed.  He ambulated without hypoxia or significant work of breathing.  I clinically doubt PE given his history of anticoagulation.  He was tested for COVID-19 during his time in the ED though result pending at time of dispo.  I did offer patient admission with continuation of IV diuresis though he is declining today.  I will order outpatient gentle diuresis for a few days as well as outpatient echo as I do not see a recent echo in the system.  He is agreeable to follow-up closely with his PCP in the next few days.  He is instructed to return for increased work of breathing, chest pain or should symptoms worsen or  change.      Diagnosis:    ICD-10-CM    1. Acute on chronic congestive heart failure, unspecified heart failure type (H)  I50.9    2. Chest pain, unspecified type  R07.9 Echocardiogram Complete       Discharge Medications:  New Prescriptions    FUROSEMIDE (LASIX) 40 MG TABLET    Take 1 tablet (40 mg) by mouth daily for 2 days       Scribe Disclosure:  Ozzy GOLDBERG, am serving as a scribe at 2:18 AM on 9/25/2021 to document services personally performed by Diana Deng DO based on my observations and the provider's statements to me.            Diana Deng DO  09/25/21 [id number]

## 2021-09-27 LAB
ATRIAL RATE - MUSE: 264 BPM
DIASTOLIC BLOOD PRESSURE - MUSE: NORMAL MMHG
INTERPRETATION ECG - MUSE: NORMAL
P AXIS - MUSE: NORMAL DEGREES
PR INTERVAL - MUSE: NORMAL MS
QRS DURATION - MUSE: 64 MS
QT - MUSE: 372 MS
QTC - MUSE: 460 MS
R AXIS - MUSE: -34 DEGREES
SYSTOLIC BLOOD PRESSURE - MUSE: NORMAL MMHG
T AXIS - MUSE: 64 DEGREES
VENTRICULAR RATE- MUSE: 92 BPM

## 2021-09-28 ENCOUNTER — HOSPITAL ENCOUNTER (INPATIENT)
Facility: CLINIC | Age: 81
LOS: 6 days | Discharge: HOME | DRG: 177 | End: 2021-10-04
Attending: EMERGENCY MEDICINE | Admitting: INTERNAL MEDICINE
Payer: MEDICARE

## 2021-09-28 ENCOUNTER — APPOINTMENT (OUTPATIENT)
Dept: GENERAL RADIOLOGY | Facility: CLINIC | Age: 81
DRG: 177 | End: 2021-09-28
Attending: EMERGENCY MEDICINE
Payer: MEDICARE

## 2021-09-28 ENCOUNTER — APPOINTMENT (OUTPATIENT)
Dept: CT IMAGING | Facility: CLINIC | Age: 81
DRG: 177 | End: 2021-09-28
Attending: INTERNAL MEDICINE
Payer: MEDICARE

## 2021-09-28 DIAGNOSIS — E11.69 TYPE 2 DIABETES MELLITUS WITH OTHER SPECIFIED COMPLICATION, WITHOUT LONG-TERM CURRENT USE OF INSULIN (H): Primary | ICD-10-CM

## 2021-09-28 DIAGNOSIS — J12.82 PNEUMONIA DUE TO 2019 NOVEL CORONAVIRUS: ICD-10-CM

## 2021-09-28 DIAGNOSIS — U07.1 PNEUMONIA DUE TO 2019 NOVEL CORONAVIRUS: ICD-10-CM

## 2021-09-28 DIAGNOSIS — U07.1 COVID-19: ICD-10-CM

## 2021-09-28 DIAGNOSIS — I48.20 CHRONIC ATRIAL FIBRILLATION (H): ICD-10-CM

## 2021-09-28 DIAGNOSIS — N17.9 AKI (ACUTE KIDNEY INJURY) (H): ICD-10-CM

## 2021-09-28 LAB
ALBUMIN SERPL-MCNC: 3.5 G/DL (ref 3.4–5)
ALP SERPL-CCNC: 51 U/L (ref 40–150)
ALT SERPL W P-5'-P-CCNC: 36 U/L (ref 0–70)
ANION GAP SERPL CALCULATED.3IONS-SCNC: 9 MMOL/L (ref 3–14)
AST SERPL W P-5'-P-CCNC: 58 U/L (ref 0–45)
BASE EXCESS BLDV CALC-SCNC: 0.2 MMOL/L (ref -7.7–1.9)
BASOPHILS # BLD AUTO: 0 10E3/UL (ref 0–0.2)
BASOPHILS NFR BLD AUTO: 0 %
BILIRUB SERPL-MCNC: 0.7 MG/DL (ref 0.2–1.3)
BUN SERPL-MCNC: 41 MG/DL (ref 7–30)
CALCIUM SERPL-MCNC: 8.9 MG/DL (ref 8.5–10.1)
CHLORIDE BLD-SCNC: 101 MMOL/L (ref 94–109)
CO2 SERPL-SCNC: 25 MMOL/L (ref 20–32)
CREAT SERPL-MCNC: 1.82 MG/DL (ref 0.66–1.25)
EOSINOPHIL # BLD AUTO: 0 10E3/UL (ref 0–0.7)
EOSINOPHIL NFR BLD AUTO: 0 %
ERYTHROCYTE [DISTWIDTH] IN BLOOD BY AUTOMATED COUNT: 13.5 % (ref 10–15)
GFR SERPL CREATININE-BSD FRML MDRD: 34 ML/MIN/1.73M2
GLUCOSE BLD-MCNC: 192 MG/DL (ref 70–99)
GLUCOSE BLDC GLUCOMTR-MCNC: 175 MG/DL (ref 70–99)
GLUCOSE BLDC GLUCOMTR-MCNC: 345 MG/DL (ref 70–99)
HCO3 BLDV-SCNC: 26 MMOL/L (ref 21–28)
HCT VFR BLD AUTO: 44.4 % (ref 40–53)
HGB BLD-MCNC: 15.1 G/DL (ref 13.3–17.7)
HOLD SPECIMEN: NORMAL
HOLD SPECIMEN: NORMAL
IMM GRANULOCYTES # BLD: 0 10E3/UL
IMM GRANULOCYTES NFR BLD: 0 %
LYMPHOCYTES # BLD AUTO: 3.1 10E3/UL (ref 0.8–5.3)
LYMPHOCYTES NFR BLD AUTO: 36 %
MCH RBC QN AUTO: 33.4 PG (ref 26.5–33)
MCHC RBC AUTO-ENTMCNC: 34 G/DL (ref 31.5–36.5)
MCV RBC AUTO: 98 FL (ref 78–100)
MONOCYTES # BLD AUTO: 0.6 10E3/UL (ref 0–1.3)
MONOCYTES NFR BLD AUTO: 7 %
NEUTROPHILS # BLD AUTO: 5 10E3/UL (ref 1.6–8.3)
NEUTROPHILS NFR BLD AUTO: 57 %
NRBC # BLD AUTO: 0 10E3/UL
NRBC BLD AUTO-RTO: 0 /100
NT-PROBNP SERPL-MCNC: 972 PG/ML (ref 0–1800)
O2/TOTAL GAS SETTING VFR VENT: 0 %
OXYHGB MFR BLDV: 55 % (ref 70–75)
PCO2 BLDV: 46 MM HG (ref 40–50)
PH BLDV: 7.36 [PH] (ref 7.32–7.43)
PLATELET # BLD AUTO: 143 10E3/UL (ref 150–450)
PO2 BLDV: 31 MM HG (ref 25–47)
POTASSIUM BLD-SCNC: 3.4 MMOL/L (ref 3.4–5.3)
PROT SERPL-MCNC: 8 G/DL (ref 6.8–8.8)
RBC # BLD AUTO: 4.52 10E6/UL (ref 4.4–5.9)
SODIUM SERPL-SCNC: 135 MMOL/L (ref 133–144)
TROPONIN I SERPL-MCNC: 0.04 UG/L (ref 0–0.04)
WBC # BLD AUTO: 8.8 10E3/UL (ref 4–11)

## 2021-09-28 PROCEDURE — 99223 1ST HOSP IP/OBS HIGH 75: CPT | Mod: AI | Performed by: INTERNAL MEDICINE

## 2021-09-28 PROCEDURE — 99285 EMERGENCY DEPT VISIT HI MDM: CPT | Mod: 25

## 2021-09-28 PROCEDURE — 120N000001 HC R&B MED SURG/OB

## 2021-09-28 PROCEDURE — 250N000013 HC RX MED GY IP 250 OP 250 PS 637: Performed by: INTERNAL MEDICINE

## 2021-09-28 PROCEDURE — 82805 BLOOD GASES W/O2 SATURATION: CPT | Performed by: EMERGENCY MEDICINE

## 2021-09-28 PROCEDURE — 250N000009 HC RX 250: Performed by: INTERNAL MEDICINE

## 2021-09-28 PROCEDURE — 258N000003 HC RX IP 258 OP 636: Performed by: EMERGENCY MEDICINE

## 2021-09-28 PROCEDURE — 80053 COMPREHEN METABOLIC PANEL: CPT | Performed by: EMERGENCY MEDICINE

## 2021-09-28 PROCEDURE — 71250 CT THORAX DX C-: CPT

## 2021-09-28 PROCEDURE — 71045 X-RAY EXAM CHEST 1 VIEW: CPT

## 2021-09-28 PROCEDURE — 36415 COLL VENOUS BLD VENIPUNCTURE: CPT | Performed by: EMERGENCY MEDICINE

## 2021-09-28 PROCEDURE — 250N000013 HC RX MED GY IP 250 OP 250 PS 637: Performed by: EMERGENCY MEDICINE

## 2021-09-28 PROCEDURE — 250N000012 HC RX MED GY IP 250 OP 636 PS 637: Performed by: INTERNAL MEDICINE

## 2021-09-28 PROCEDURE — 84484 ASSAY OF TROPONIN QUANT: CPT | Performed by: EMERGENCY MEDICINE

## 2021-09-28 PROCEDURE — 93005 ELECTROCARDIOGRAM TRACING: CPT

## 2021-09-28 PROCEDURE — 85025 COMPLETE CBC W/AUTO DIFF WBC: CPT | Performed by: EMERGENCY MEDICINE

## 2021-09-28 PROCEDURE — 250N000011 HC RX IP 250 OP 636: Performed by: EMERGENCY MEDICINE

## 2021-09-28 PROCEDURE — 258N000003 HC RX IP 258 OP 636: Performed by: INTERNAL MEDICINE

## 2021-09-28 PROCEDURE — XW033E5 INTRODUCTION OF REMDESIVIR ANTI-INFECTIVE INTO PERIPHERAL VEIN, PERCUTANEOUS APPROACH, NEW TECHNOLOGY GROUP 5: ICD-10-PCS | Performed by: HOSPITALIST

## 2021-09-28 PROCEDURE — 96374 THER/PROPH/DIAG INJ IV PUSH: CPT

## 2021-09-28 PROCEDURE — 83880 ASSAY OF NATRIURETIC PEPTIDE: CPT | Performed by: EMERGENCY MEDICINE

## 2021-09-28 PROCEDURE — 96361 HYDRATE IV INFUSION ADD-ON: CPT

## 2021-09-28 RX ORDER — LOSARTAN POTASSIUM 100 MG/1
100 TABLET ORAL EVERY MORNING
Status: ON HOLD | COMMUNITY
End: 2021-10-04

## 2021-09-28 RX ORDER — AMOXICILLIN 250 MG
1 CAPSULE ORAL 2 TIMES DAILY PRN
Status: DISCONTINUED | OUTPATIENT
Start: 2021-09-28 | End: 2021-10-04 | Stop reason: HOSPADM

## 2021-09-28 RX ORDER — ASPIRIN 81 MG/1
81 TABLET ORAL DAILY
Status: DISCONTINUED | OUTPATIENT
Start: 2021-09-29 | End: 2021-10-04 | Stop reason: HOSPADM

## 2021-09-28 RX ORDER — DEXAMETHASONE SODIUM PHOSPHATE 10 MG/ML
6 INJECTION, SOLUTION INTRAMUSCULAR; INTRAVENOUS ONCE
Status: COMPLETED | OUTPATIENT
Start: 2021-09-28 | End: 2021-09-28

## 2021-09-28 RX ORDER — AMOXICILLIN 250 MG
2 CAPSULE ORAL 2 TIMES DAILY PRN
Status: DISCONTINUED | OUTPATIENT
Start: 2021-09-28 | End: 2021-10-04 | Stop reason: HOSPADM

## 2021-09-28 RX ORDER — ONDANSETRON 2 MG/ML
4 INJECTION INTRAMUSCULAR; INTRAVENOUS EVERY 6 HOURS PRN
Status: DISCONTINUED | OUTPATIENT
Start: 2021-09-28 | End: 2021-10-04 | Stop reason: HOSPADM

## 2021-09-28 RX ORDER — METOPROLOL TARTRATE 25 MG/1
25 TABLET, FILM COATED ORAL 2 TIMES DAILY
Status: DISCONTINUED | OUTPATIENT
Start: 2021-09-28 | End: 2021-10-04

## 2021-09-28 RX ORDER — ONDANSETRON 4 MG/1
4 TABLET, ORALLY DISINTEGRATING ORAL EVERY 6 HOURS PRN
Status: DISCONTINUED | OUTPATIENT
Start: 2021-09-28 | End: 2021-10-04 | Stop reason: HOSPADM

## 2021-09-28 RX ORDER — FAMOTIDINE 20 MG/1
20 TABLET, FILM COATED ORAL DAILY
Status: DISCONTINUED | OUTPATIENT
Start: 2021-09-28 | End: 2021-10-02

## 2021-09-28 RX ORDER — ACETAMINOPHEN 325 MG/1
650 TABLET ORAL EVERY 6 HOURS PRN
Status: DISCONTINUED | OUTPATIENT
Start: 2021-09-28 | End: 2021-10-04 | Stop reason: HOSPADM

## 2021-09-28 RX ORDER — ACETAMINOPHEN 650 MG/1
650 SUPPOSITORY RECTAL EVERY 6 HOURS PRN
Status: DISCONTINUED | OUTPATIENT
Start: 2021-09-28 | End: 2021-10-04 | Stop reason: HOSPADM

## 2021-09-28 RX ORDER — SIMVASTATIN 40 MG
40 TABLET ORAL AT BEDTIME
Status: DISCONTINUED | OUTPATIENT
Start: 2021-09-28 | End: 2021-10-04 | Stop reason: HOSPADM

## 2021-09-28 RX ORDER — BENZONATATE 100 MG/1
100 CAPSULE ORAL 3 TIMES DAILY PRN
Status: DISCONTINUED | OUTPATIENT
Start: 2021-09-28 | End: 2021-10-04 | Stop reason: HOSPADM

## 2021-09-28 RX ORDER — NICOTINE POLACRILEX 4 MG
15-30 LOZENGE BUCCAL
Status: DISCONTINUED | OUTPATIENT
Start: 2021-09-28 | End: 2021-10-04 | Stop reason: HOSPADM

## 2021-09-28 RX ORDER — POLYETHYLENE GLYCOL 3350 17 G/17G
17 POWDER, FOR SOLUTION ORAL DAILY PRN
Status: DISCONTINUED | OUTPATIENT
Start: 2021-09-28 | End: 2021-10-04 | Stop reason: HOSPADM

## 2021-09-28 RX ORDER — DEXAMETHASONE SODIUM PHOSPHATE 4 MG/ML
6 INJECTION, SOLUTION INTRA-ARTICULAR; INTRALESIONAL; INTRAMUSCULAR; INTRAVENOUS; SOFT TISSUE EVERY 24 HOURS
Status: DISCONTINUED | OUTPATIENT
Start: 2021-09-29 | End: 2021-10-04 | Stop reason: HOSPADM

## 2021-09-28 RX ORDER — GUAIFENESIN/DEXTROMETHORPHAN 100-10MG/5
5 SYRUP ORAL EVERY 4 HOURS PRN
Status: DISCONTINUED | OUTPATIENT
Start: 2021-09-28 | End: 2021-10-04 | Stop reason: HOSPADM

## 2021-09-28 RX ORDER — DEXTROSE MONOHYDRATE 25 G/50ML
25-50 INJECTION, SOLUTION INTRAVENOUS
Status: DISCONTINUED | OUTPATIENT
Start: 2021-09-28 | End: 2021-10-04 | Stop reason: HOSPADM

## 2021-09-28 RX ORDER — LIDOCAINE 40 MG/G
CREAM TOPICAL
Status: DISCONTINUED | OUTPATIENT
Start: 2021-09-28 | End: 2021-10-04 | Stop reason: HOSPADM

## 2021-09-28 RX ORDER — METOPROLOL TARTRATE 1 MG/ML
5 INJECTION, SOLUTION INTRAVENOUS EVERY 4 HOURS PRN
Status: DISCONTINUED | OUTPATIENT
Start: 2021-09-28 | End: 2021-10-04 | Stop reason: HOSPADM

## 2021-09-28 RX ORDER — ACETAMINOPHEN 500 MG
1000 TABLET ORAL ONCE
Status: COMPLETED | OUTPATIENT
Start: 2021-09-28 | End: 2021-09-28

## 2021-09-28 RX ADMIN — METOPROLOL TARTRATE 25 MG: 25 TABLET, FILM COATED ORAL at 21:57

## 2021-09-28 RX ADMIN — REMDESIVIR 200 MG: 100 INJECTION, POWDER, LYOPHILIZED, FOR SOLUTION INTRAVENOUS at 19:48

## 2021-09-28 RX ADMIN — FAMOTIDINE 20 MG: 20 TABLET ORAL at 19:50

## 2021-09-28 RX ADMIN — DEXAMETHASONE SODIUM PHOSPHATE 6 MG: 10 INJECTION, SOLUTION INTRAMUSCULAR; INTRAVENOUS at 15:53

## 2021-09-28 RX ADMIN — INSULIN ASPART 1 UNITS: 100 INJECTION, SOLUTION INTRAVENOUS; SUBCUTANEOUS at 20:40

## 2021-09-28 RX ADMIN — SODIUM CHLORIDE 50 ML: 9 INJECTION, SOLUTION INTRAVENOUS at 21:56

## 2021-09-28 RX ADMIN — ACETAMINOPHEN 1000 MG: 500 TABLET, FILM COATED ORAL at 11:31

## 2021-09-28 RX ADMIN — SODIUM CHLORIDE 500 ML: 9 INJECTION, SOLUTION INTRAVENOUS at 14:40

## 2021-09-28 RX ADMIN — SIMVASTATIN 40 MG: 40 TABLET, FILM COATED ORAL at 21:57

## 2021-09-28 RX ADMIN — RIVAROXABAN 15 MG: 15 TABLET, FILM COATED ORAL at 20:40

## 2021-09-28 ASSESSMENT — ACTIVITIES OF DAILY LIVING (ADL)
DOING_ERRANDS_INDEPENDENTLY_DIFFICULTY: NO
FALL_HISTORY_WITHIN_LAST_SIX_MONTHS: NO
DIFFICULTY_COMMUNICATING: NO
DOING_ERRANDS_INDEPENDENTLY_DIFFICULTY: NO
WEAR_GLASSES_OR_BLIND: YES
TOILETING_ISSUES: NO
WALKING_OR_CLIMBING_STAIRS_DIFFICULTY: NO
TOILETING_ISSUES: NO
WALKING_OR_CLIMBING_STAIRS_DIFFICULTY: NO
DOING_ERRANDS_INDEPENDENTLY_DIFFICULTY: NO
DIFFICULTY_COMMUNICATING: NO
DRESSING/BATHING_DIFFICULTY: NO
DRESSING/BATHING_DIFFICULTY: NO
CONCENTRATING,_REMEMBERING_OR_MAKING_DECISIONS_DIFFICULTY: NO
DIFFICULTY_COMMUNICATING: NO
DIFFICULTY_EATING/SWALLOWING: NO
CONCENTRATING,_REMEMBERING_OR_MAKING_DECISIONS_DIFFICULTY: NO
HEARING_DIFFICULTY_OR_DEAF: NO
DIFFICULTY_EATING/SWALLOWING: NO
FALL_HISTORY_WITHIN_LAST_SIX_MONTHS: NO
FALL_HISTORY_WITHIN_LAST_SIX_MONTHS: NO
TOILETING_ISSUES: NO
WEAR_GLASSES_OR_BLIND: YES
DRESSING/BATHING_DIFFICULTY: NO
CONCENTRATING,_REMEMBERING_OR_MAKING_DECISIONS_DIFFICULTY: NO
WEAR_GLASSES_OR_BLIND: YES
DIFFICULTY_EATING/SWALLOWING: NO
ADLS_ACUITY_SCORE: 14
WALKING_OR_CLIMBING_STAIRS_DIFFICULTY: NO

## 2021-09-28 ASSESSMENT — ENCOUNTER SYMPTOMS
SHORTNESS OF BREATH: 1
FATIGUE: 1

## 2021-09-28 ASSESSMENT — MIFFLIN-ST. JEOR
SCORE: 1583.97
SCORE: 1577.62
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
## 2021-09-28 NOTE — ED PROVIDER NOTES
"  History   Chief Complaint:  Covid Concern and Shortness of Breath       HPI   Mark Shultz is a 81 year old male with history of diabetes, hypertension, hyperlipidemia, and hypothyroidism who presents with COVID concern and shortness of breath. The patient reports that he tested positive for COVID on 9/25/2021 at his most recent ED visit. He presented with left-sided non-radiating chest pressure and fatigue at this time. Since this visit, fatigue has continued to progress and he endorses difficulty walking long distances due to shortness of breath. Oxygen saturations have been around 90-93% on his pulse oximeter at home.     Review of Systems   Constitutional: Positive for fatigue.   Respiratory: Positive for shortness of breath.    All other systems reviewed and are negative.      Allergies:  Codeine   Shellfish allergy  Tylenol with codeine    Medications:  Aspirin  Lasix  Glipizide  Levothyroxine  Tradjenta  Lisinopril hydrochlorothiazide  Metformin  Metoprolol  Xarelto  Simvastatin    Past Medical History:    BPH  Diabetes  Hypercholesterolemia  Hypertension  Hypothyroid  Lumbago  LVH  Obstructive sleep apnea  Peripheral neuropathy    Past Surgical History:    Upper abdominal hernia repair  Appendectomy     Family History:    Asthma, mother  COPD, sister  Thyroid disease, brother  Type II diabetes, brother  CAD, brother  Cancer, father    Social History:  Arrives via car  Unaccompanied in ED     Physical Exam     Patient Vitals for the past 24 hrs:   BP Temp Temp src Pulse Resp SpO2 Height Weight   09/28/21 1434 -- -- -- -- -- -- 1.727 m (5' 8\") 89.8 kg (198 lb)   09/28/21 1430 90/65 -- -- 105 -- (!) 88 % -- --   09/28/21 1415 92/63 -- -- 105 -- (!) 86 % -- --   09/28/21 1400 100/69 -- -- 76 -- 97 % -- --   09/28/21 1123 99/77 100  F (37.8  C) Oral 86 28 93 % -- --       Physical Exam  Constitutional: Alert, attentive, GCS 15  HENT:    Nose: Nose normal.    Mouth/Throat: Oropharynx is clear, mucous membranes " are dry  Eyes: EOM are normal, anicteric, conjugate gaze  CV: regular rate and rhythm; no murmurs  Chest: Effort normal and breath sounds clear without wheezing or rales, symmetric bilaterally   GI:  non tender. No distension. No guarding or rebound.    MSK: No LE edema, no tenderness to palpation of BLE.  Neurological: Alert, attentive, moving all extremities equally.   Skin: Skin is warm and dry.    Emergency Department Course   ECG  ECG taken at 1428, ECG read at 1434  Atrial fibrillation with rapid ventricular response  Left axis deviation  Anteroseptal infarct, age undetermined  Abnormal ECG   Previous atrial flutter, no atrial fibrillation as compared to prior, dated 2021.  Rate 112 bpm. KY interval * ms. QRS duration 72 ms. QT/QTc 338/461 ms. P-R-T axes * -56 21.     Imaging:  XR Chest Port 1 View   Final Result   IMPRESSION: Lungs clear. No pneumothorax. Heart size has decreased   slightly since the previous exam. Pulmonary vascularity is within   normal limits. Aortic calcification.      JAMIE BRANCH MD            SYSTEM ID:  [id number]          Laboratory:    CMP: urea nitrogen 41 (H), glucose 192 (H), GFR 34 (L), AST 58 (H) o/w WNL (Creatinine 1.82 (H))     blood gas venous: pH: 7.36, PCO2: 46, PO2: 31, Bicarbonate: 26, Oxyhgb: 55 (L), FIO2 0, base excess 0.2    CBC: WBC 8.8, HGB 15.1,  (L)    Troponin (Collected 1355): 0.039    BNP: 972    Emergency Department Course:    Reviewed:  I reviewed nursing notes, vitals and past medical history    Assessments:  1328 I obtained history and examined the patient as noted above.   1530 I rechecked the patient and explained findings.   1545    I spoke to Dr. Silveira    Interventions:  1131 Tylenol 1000 mg PO    Disposition:  The patient was admitted to the hospital under the care of Dr. Silveira       Impression & Plan     Medical Decision Makin-year-old male past medical history seen for diabetes, hypertension, hyperlipidemia,  hypothyroidism presenting for evaluation of increased shortness of breath this is in the setting of a positive Covid test 3 days ago.  He was initiated on Xarelto and diuretics 1 month ago after he presented here for new onset A. 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  When he was here 3 days ago, they also recommended admission for diuresis however again he declined and was sent home with increased diuretics, this is despite high normal BNP.  Here his labs show an TITUS with a creatinine of 1.82, up from a baseline of 1.  EKG here shows A. fib with ventricular rate in the low 100s, however he was hypoxic into the upper 80s with ambulation.  Chest x-ray is clear, I doubt PE given his anticoagulation use.   certainly could have radiographically silent Covid especially with dehydration and overdiuresis.  Given his somewhat acute on chronic dyspnea on exertion and no hypoxia, will admit to medicine for continued treatment of presumed Covid pneumonia in addition to his TITUS A. Fib/flutter.    Covid-19  Mark Shultz was evaluated during a global COVID-19 pandemic, which necessitated consideration that the patient might be at risk for infection with the SARS-CoV-2 virus that causes COVID-19.   Applicable protocols for evaluation were followed during the patient's care.   COVID-19 was considered as part of the patient's evaluation. The plan for testing is:  a test was obtained during this visit.     Diagnosis:    ICD-10-CM    1. Pneumonia due to 2019 novel coronavirus  U07.1     J12.82    2. Chronic atrial fibrillation (H)  I48.20    3. TITUS (acute kidney injury) (H)  N17.9        Heron Gauthier MD  [employer]  3:51 PM 09/28/21     Scribe Disclosure:  IFeliberto, am serving as a scribe at 1:28 PM on 9/28/2021 to document services personally performed by Heron Gauthier MD based on my observations and the provider's statements to me.            Disha  Heron Allison MD  09/28/21 4220

## 2021-09-28 NOTE — ED TRIAGE NOTES
Patient presents COVID+ with concerns for SOB and cough. Tested positive 9/25. Patient endorses being very fatigued and unable to walk long distances. Checking oxygen at home with pulse oximeter, reading around 90-93%.

## 2021-09-28 NOTE — ED NOTES
"[facility]  ED Nurse Handoff Report    Mark Shultz is a 81 year old male   ED Chief complaint: Covid Concern and Shortness of Breath  . ED Diagnosis:   Final diagnoses:   Pneumonia due to 2019 novel coronavirus   Chronic atrial fibrillation (H)   TITUS (acute kidney injury) (H)     Allergies:   Allergies   Allergen Reactions     Codeine Nausea and Vomiting     Shellfish Allergy      Tylenol W/Codeine [Acetaminophen-Codeine]        Code Status: Full Code  Activity level - Baseline/Home:  Independent. Activity Level - Current:   Stand by Assist. Lift room needed: No. Bariatric: No   Needed: No   Isolation: Yes. Infection:COVID + on 9/25/21    Vital Signs:   Vitals:    09/28/21 1434 09/28/21 1515 09/28/21 1600 09/28/21 1615   BP:  (!) 128/118 (!) 125/116 117/79   Pulse:   102 102   Resp:       Temp:       TempSrc:       SpO2:  97% 92% 92%   Weight: 89.8 kg (198 lb)      Height: 1.727 m (5' 8\")          Cardiac Rhythm:  ,      Pain level:    Patient confused: No. Patient Falls Risk: Yes.   Elimination Status: Has voided   Patient Report - Initial Complaint:Patient presents COVID+ with concerns for SOB and cough. Tested positive 9/25. Patient endorses being very fatigued and unable to walk long distances. Checking oxygen at home with pulse oximeter, reading around 90-93%. . Focused Assessment: Desaturated to 88-89 with activity on room air   Tests Performed: refer to orders. Abnormal Results: refer to results review.   Treatments provided: refer to eMAR  Family Comments: wife admitted   OBS brochure/video discussed/provided to patient:  Yes  ED Medications:   Medications   acetaminophen (TYLENOL) tablet 1,000 mg (1,000 mg Oral Given 9/28/21 1131)   0.9% sodium chloride BOLUS (500 mLs Intravenous New Bag 9/28/21 1440)   dexamethasone PF (DECADRON) injection 6 mg (6 mg Intravenous Given 9/28/21 1553)     Drips infusing:  No  For the majority of the shift, the patient's behavior Green. Interventions " performed were n/a.    Sepsis treatment initiated: No     Patient tested for COVID 19 prior to admission: NO    ED Nurse Name/Phone Number: Rick Thomas RN,   4:25 PM  RECEIVING UNIT ED HANDOFF REVIEW    Above ED Nurse Handoff Report was reviewed: Yes  Reviewed by: Tejas Winston RN on September 28, 2021 at 6:11 PM

## 2021-09-29 ENCOUNTER — APPOINTMENT (OUTPATIENT)
Dept: CARDIOLOGY | Facility: CLINIC | Age: 81
DRG: 177 | End: 2021-09-29
Attending: INTERNAL MEDICINE
Payer: MEDICARE

## 2021-09-29 LAB
ANION GAP SERPL CALCULATED.3IONS-SCNC: 5 MMOL/L (ref 3–14)
ATRIAL RATE - MUSE: 122 BPM
BUN SERPL-MCNC: 37 MG/DL (ref 7–30)
CALCIUM SERPL-MCNC: 8.6 MG/DL (ref 8.5–10.1)
CHLORIDE BLD-SCNC: 105 MMOL/L (ref 94–109)
CO2 SERPL-SCNC: 26 MMOL/L (ref 20–32)
CREAT SERPL-MCNC: 1.26 MG/DL (ref 0.66–1.25)
CRP SERPL-MCNC: 43.7 MG/L (ref 0–8)
DIASTOLIC BLOOD PRESSURE - MUSE: NORMAL MMHG
ERYTHROCYTE [SEDIMENTATION RATE] IN BLOOD BY WESTERGREN METHOD: 13 MM/HR (ref 0–20)
GFR SERPL CREATININE-BSD FRML MDRD: 53 ML/MIN/1.73M2
GLUCOSE BLD-MCNC: 199 MG/DL (ref 70–99)
GLUCOSE BLDC GLUCOMTR-MCNC: 150 MG/DL (ref 70–99)
GLUCOSE BLDC GLUCOMTR-MCNC: 231 MG/DL (ref 70–99)
GLUCOSE BLDC GLUCOMTR-MCNC: 246 MG/DL (ref 70–99)
GLUCOSE BLDC GLUCOMTR-MCNC: 293 MG/DL (ref 70–99)
GLUCOSE BLDC GLUCOMTR-MCNC: 364 MG/DL (ref 70–99)
INTERPRETATION ECG - MUSE: NORMAL
LVEF ECHO: NORMAL
P AXIS - MUSE: NORMAL DEGREES
POTASSIUM BLD-SCNC: 3.8 MMOL/L (ref 3.4–5.3)
PR INTERVAL - MUSE: NORMAL MS
QRS DURATION - MUSE: 72 MS
QT - MUSE: 338 MS
QTC - MUSE: 461 MS
R AXIS - MUSE: -56 DEGREES
SODIUM SERPL-SCNC: 136 MMOL/L (ref 133–144)
SYSTOLIC BLOOD PRESSURE - MUSE: NORMAL MMHG
T AXIS - MUSE: 21 DEGREES
TROPONIN I SERPL-MCNC: <0.015 UG/L (ref 0–0.04)
VENTRICULAR RATE- MUSE: 112 BPM

## 2021-09-29 PROCEDURE — 93308 TTE F-UP OR LMTD: CPT | Mod: 26 | Performed by: INTERNAL MEDICINE

## 2021-09-29 PROCEDURE — 93321 DOPPLER ECHO F-UP/LMTD STD: CPT | Mod: 26 | Performed by: INTERNAL MEDICINE

## 2021-09-29 PROCEDURE — 120N000001 HC R&B MED SURG/OB

## 2021-09-29 PROCEDURE — 258N000003 HC RX IP 258 OP 636: Performed by: INTERNAL MEDICINE

## 2021-09-29 PROCEDURE — 36415 COLL VENOUS BLD VENIPUNCTURE: CPT | Performed by: INTERNAL MEDICINE

## 2021-09-29 PROCEDURE — 250N000012 HC RX MED GY IP 250 OP 636 PS 637: Performed by: INTERNAL MEDICINE

## 2021-09-29 PROCEDURE — 93321 DOPPLER ECHO F-UP/LMTD STD: CPT

## 2021-09-29 PROCEDURE — 250N000011 HC RX IP 250 OP 636: Performed by: INTERNAL MEDICINE

## 2021-09-29 PROCEDURE — 84484 ASSAY OF TROPONIN QUANT: CPT | Performed by: INTERNAL MEDICINE

## 2021-09-29 PROCEDURE — 250N000013 HC RX MED GY IP 250 OP 250 PS 637: Performed by: INTERNAL MEDICINE

## 2021-09-29 PROCEDURE — 86140 C-REACTIVE PROTEIN: CPT | Performed by: INTERNAL MEDICINE

## 2021-09-29 PROCEDURE — 99233 SBSQ HOSP IP/OBS HIGH 50: CPT | Performed by: HOSPITALIST

## 2021-09-29 PROCEDURE — 85652 RBC SED RATE AUTOMATED: CPT | Performed by: INTERNAL MEDICINE

## 2021-09-29 PROCEDURE — 255N000002 HC RX 255 OP 636: Performed by: INTERNAL MEDICINE

## 2021-09-29 PROCEDURE — 250N000009 HC RX 250: Performed by: INTERNAL MEDICINE

## 2021-09-29 PROCEDURE — 250N000013 HC RX MED GY IP 250 OP 250 PS 637: Performed by: HOSPITALIST

## 2021-09-29 PROCEDURE — 80048 BASIC METABOLIC PNL TOTAL CA: CPT | Performed by: INTERNAL MEDICINE

## 2021-09-29 PROCEDURE — 93325 DOPPLER ECHO COLOR FLOW MAPG: CPT | Mod: 26 | Performed by: INTERNAL MEDICINE

## 2021-09-29 RX ORDER — LEVOTHYROXINE SODIUM 112 UG/1
112 TABLET ORAL DAILY
Status: DISCONTINUED | OUTPATIENT
Start: 2021-09-29 | End: 2021-10-04 | Stop reason: HOSPADM

## 2021-09-29 RX ADMIN — METOPROLOL TARTRATE 25 MG: 25 TABLET, FILM COATED ORAL at 09:14

## 2021-09-29 RX ADMIN — SIMVASTATIN 40 MG: 40 TABLET, FILM COATED ORAL at 21:26

## 2021-09-29 RX ADMIN — ASPIRIN 81 MG: 81 TABLET, COATED ORAL at 09:14

## 2021-09-29 RX ADMIN — INSULIN ASPART 1 UNITS: 100 INJECTION, SOLUTION INTRAVENOUS; SUBCUTANEOUS at 09:24

## 2021-09-29 RX ADMIN — FAMOTIDINE 20 MG: 20 TABLET ORAL at 09:15

## 2021-09-29 RX ADMIN — LEVOTHYROXINE SODIUM 112 MCG: 0.11 TABLET ORAL at 13:26

## 2021-09-29 RX ADMIN — INSULIN ASPART 5 UNITS: 100 INJECTION, SOLUTION INTRAVENOUS; SUBCUTANEOUS at 17:45

## 2021-09-29 RX ADMIN — SODIUM CHLORIDE 50 ML: 9 INJECTION, SOLUTION INTRAVENOUS at 17:36

## 2021-09-29 RX ADMIN — RIVAROXABAN 20 MG: 20 TABLET, FILM COATED ORAL at 17:37

## 2021-09-29 RX ADMIN — INSULIN GLARGINE 10 UNITS: 100 INJECTION, SOLUTION SUBCUTANEOUS at 09:14

## 2021-09-29 RX ADMIN — DEXAMETHASONE SODIUM PHOSPHATE 6 MG: 4 INJECTION, SOLUTION INTRAMUSCULAR; INTRAVENOUS at 09:15

## 2021-09-29 RX ADMIN — INSULIN ASPART 2 UNITS: 100 INJECTION, SOLUTION INTRAVENOUS; SUBCUTANEOUS at 13:26

## 2021-09-29 RX ADMIN — HUMAN ALBUMIN MICROSPHERES AND PERFLUTREN 3 ML: 10; .22 INJECTION, SOLUTION INTRAVENOUS at 09:18

## 2021-09-29 RX ADMIN — REMDESIVIR 100 MG: 100 INJECTION, POWDER, LYOPHILIZED, FOR SOLUTION INTRAVENOUS at 17:35

## 2021-09-29 ASSESSMENT — ACTIVITIES OF DAILY LIVING (ADL)
ADLS_ACUITY_SCORE: 6
ADLS_ACUITY_SCORE: 6
ADLS_ACUITY_SCORE: 8
ADLS_ACUITY_SCORE: 6

## 2021-09-29 ASSESSMENT — MIFFLIN-ST. JEOR: SCORE: 1584.88

## 2021-09-29 NOTE — PROGRESS NOTES
[facility]    Hospitalist Progress Note      Assessment & Plan   Mark Shultz is a 81 year old male with PMH including BPH, relatively recent diagnosis of atrial fibrillation/flutter less than 4 weeks ago, sleep apnea, type 2 diabetes, hypertension who presents with cough, malaise, shortness of breath and rapid heart rate.     #Acute hypoxemic respiratory failure secondary to COVID-19 PNA: Patient's wife is currently admitted here with Covid-19 pneumonia as well.  He is vaccinated with [employer] Mukul vaccine.  He has had multiple visits to the ER over the past month including 1 to 3 days prior to admission where he was felt to be slightly hypervolemic and started on Lasix.  In the ER, patient hypoxemic to the mid 80s with low-grade fever.  CT of the chest consistent with Covid-19 pneumonia.  -Continuous pulse oximetry.  Wean oxygen as able.  -Started on remdesivir and dexamethasone therapy.  We will continue.  -prn anti-tussives  -Xarelto for a/c  -Follow inflammatory markers, oxygen needs, etc.  -Maintain precautions.    #Acute kidney injury: Presents with creatinine of 1.82 with BUN of 41, up from creatinine of 1.03 days ago with BUN of 16 at that time.  Was started on diuretics and is also on multiple potentially nephrotoxic agents including HCTZ and losartan  -Renal function improving.  -Hold home hydrochlorothiazide and losartan  -Hold further IV maintenance fluids given Covid-19 pneumonia.  Bolus as needed for low blood pressures.    #Aflutter: Diagnosed a month ago.  We will pursue rate control strategy and treat infection as above.  TTE obtained showed EF 45-50% which was mildly reduced from prior with global hypokinesia.  Denies any chest pain or discomfort.  -Metoprolol 25 mg twice daily  -IV metoprolol 5 mg as needed for rates greater than 120  -Continue Xarelto    #Type 2 diabetes: There is the potential for hyperglycemia in the setting of steroid use.  We will schedule 10  units of Lantus in the morning and have sliding scale available.  Titrate as needed.  Blood sugars seem better this afternoon.     #ABDIEL: Does not use CPAP at home currently but did at 1 point.  Will offer CPAP while here.  This could be part of the reason he is having issues with atrial arrhythmias recently.  Educated  #Hypothyroidism: Resume home Synthroid  #History of hypertension: Due to acute kidney injury will hold HCTZ and losartan.  Continue metoprolol.  # Detectable troponin: 0.039: May be somewhat worse due to acute kidney failure and also demand ischemia in the setting of hypoxic respiratory failure.  Troponin repeat negative.  TTE as above. No chest pain. Continuing aspirin, statin and xarelto.     DVT Prophylaxis: DOAC  Code Status: Full Code  Dispo: Likely several days.     Abe Junior MD  Text Page    Interval History   No events.  Patient feels better than yesterday.  His main complaint is his concern for his wife who is also hospitalized for Covid-19 pneumonia.  He denies any chest pain or discomfort.  Cough is seemingly better.  No worsening shortness of breath.  No nausea or vomiting.    -Data reviewed today: I reviewed all new labs and imaging results over the last 24 hours.     Physical Exam   Temp: 97.7  F (36.5  C) Temp src: Oral BP: 132/87 Pulse: 87   Resp: 20 SpO2: 94 % O2 Device: Nasal cannula Oxygen Delivery: 3 LPM  Vitals:    09/28/21 1434 09/28/21 1830 09/29/21 0505   Weight: 89.8 kg (198 lb) 90.4 kg (199 lb 6.4 oz) 90.5 kg (199 lb 9.6 oz)     Vital Signs with Ranges  Temp:  [97.7  F (36.5  C)-98  F (36.7  C)] 97.7  F (36.5  C)  Pulse:  [] 87  Resp:  [20] 20  BP: ()/() 132/87  SpO2:  [86 %-97 %] 94 %  I/O last 3 completed shifts:  In: -   Out: 100 [Urine:100]    General: Alert, awake, no acute distress.  HEENT: NC/AT, MMM, no oral lesions.    Cardiac: irregular, S1, S2.  No murmurs appreciated.  Pulmonary: Some crackles bilaterally in lower lobes. No wheeze. Nl WOB.  Conversatonal.   Abdomen: soft, non-tender, non-distended.  Bowel Sounds Present.  No guarding.  Extremities: no deformities.  Warm, well perfused.  Skin: no rashes or lesions noted.  Warm and Dry.  Neuro: No focal deficits noted.  Speech clear.  Coordination and strength grossly normal.    Medications     - MEDICATION INSTRUCTIONS -         remdesivir  100 mg Intravenous Q24H    And     sodium chloride 0.9%  50 mL Intravenous Q24H     aspirin  81 mg Oral Daily     dexamethasone  6 mg Intravenous Q24H     famotidine  20 mg Oral Daily     insulin aspart  1-7 Units Subcutaneous TID AC     insulin aspart  1-5 Units Subcutaneous At Bedtime     insulin glargine  10 Units Subcutaneous QAM AC     metoprolol tartrate  25 mg Oral BID     rivaroxaban ANTICOAGULANT  20 mg Oral Daily with supper     simvastatin  40 mg Oral At Bedtime     sodium chloride (PF)  3 mL Intracatheter Q8H       Data   Recent Labs   Lab 09/29/21  0918 09/29/21  0635 09/29/21  0211 09/28/21  1938 09/28/21  1355 09/25/21  0403 09/25/21  0122 09/25/21  0122   WBC  --   --   --   --  8.8  --   --  8.0   HGB  --   --   --   --  15.1  --   --  13.2*   MCV  --   --   --   --  98  --   --  100   PLT  --   --   --   --  143*  --   --  163   NA  --  136  --   --  135  --   --  139   POTASSIUM  --  3.8  --   --  3.4  --   --  3.7   CHLORIDE  --  105  --   --  101  --   --  107   CO2  --  26  --   --  25  --   --  28   BUN  --  37*  --   --  41*  --   --  16   CR  --  1.26*  --   --  1.82*  --   --  1.00   ANIONGAP  --  5  --   --  9  --   --  4   BREANNE  --  8.6  --   --  8.9  --   --  8.6   * 199* 246*   < > 192*  --    < > 86   ALBUMIN  --   --   --   --  3.5  --   --  3.3*  3.4   PROTTOTAL  --   --   --   --  8.0  --   --  7.3  7.3   BILITOTAL  --   --   --   --  0.7  --   --  1.1  1.2   ALKPHOS  --   --   --   --  51  --   --  52  55   ALT  --   --   --   --  36  --   --  16  16   AST  --   --   --   --  58*  --   --  13  12   LIPASE  --   --   --    --   --   --   --  104   TROPONIN  --  <0.015  --   --  0.039 0.016   < > 0.015    < > = values in this interval not displayed.       Recent Results (from the past 24 hour(s))   XR Chest Port 1 View    Narrative    CHEST ONE VIEW PORTABLE 2021 3:01 PM     HISTORY: COVID-19. Shortness of breath.    COMPARISON: 2021.      Impression    IMPRESSION: Lungs clear. No pneumothorax. Heart size has decreased  slightly since the previous exam. Pulmonary vascularity is within  normal limits. Aortic calcification.    JAMIE BRANCH MD         SYSTEM ID:  [id number]   CT Chest w/o Contrast    Narrative    EXAM: CT CHEST W/O CONTRAST  LOCATION: [facility]  DATE/TIME: 2021 9:10 PM    INDICATION: Pneumonia, effusion or abscess suspected, xray done  COMPARISON: Chest x-ray from today, CT 2021  TECHNIQUE: CT chest without IV contrast. Multiplanar reformats were obtained. Dose reduction techniques were used.  CONTRAST: None.    FINDINGS:   LUNGS AND PLEURA: There are now patchy groundglass focal infiltrates predominantly in the peripheral distribution very consistent with COVID related pneumonia. There is no dense consolidation or pleural effusion. Resolution of the mild interstitial edema   seen previously.    MEDIASTINUM/AXILLAE: No lymphadenopathy. No thoracic aortic aneurysm.    CORONARY ARTERY CALCIFICATION: None.    UPPER ABDOMEN: No significant finding.    MUSCULOSKELETAL: Degenerative changes throughout thoracic spine.      Impression    IMPRESSION:   1.  New patchy groundglass infiltrates throughout both lungs very consistent with COVID pneumonia.     Echo Limited   Result Value    LVEF  45-50%    Narrative    [id number]  [id number]  [id number]  [id number]^CAIN^HEATH^[facility]  Echocardiography Laboratory  [address] [place] [address]     Name: STAR BRADLEY  MRN: [id number]  : 1940  Study Date: 2021 08:58 AM  Age:  81 yrs  Gender: Male  Patient Location: [facility]  Reason For Study: Aflutter  Ordering Physician: HEATH BARLOW  Referring Physician: Abe Eng  Performed By: Shannen Ayers RDCS     BSA: 2.0 m2  Height: 68 in  Weight: 199 lb  HR: 85  BP: 132/96 mmHg  ______________________________________________________________________________  Procedure  Limited Portable Echo Adult. Optison (NDC #[id number]) given intravenously.  ______________________________________________________________________________  Interpretation Summary     Left ventricular systolic function is mildly reduced.  The visual ejection fraction is 45-50%.  There is mild global hypokinesia of the left ventricle.  EF mildly decreased compared to 2015. The study was technically difficult.  ______________________________________________________________________________  Left Ventricle  Left ventricular systolic function is mildly reduced. The visual ejection  fraction is 45-50%. There is mild global hypokinesia of the left ventricle.     Right Ventricle  The right ventricle is grossly normal size. The right ventricular systolic  function is normal.     Mitral Valve  There is trace mitral regurgitation.     Tricuspid Valve  No tricuspid regurgitation. Right ventricular systolic pressure could not be  approximated due to inadequate tricuspid regurgitation. IVC diameter <2.1 cm  collapsing >50% with sniff suggests a normal RA pressure of 3 mmHg.     Pericardium  There is no pericardial effusion.     Rhythm  The rhythm was undetermined.  ______________________________________________________________________________  Report approved by: Smith Rupa, MDon 09/29/2021 10:14 AM     ______________________________________________________________________________

## 2021-09-29 NOTE — PLAN OF CARE
End of Shift Summary  For vital signs and complete assessments, please see documentation flowsheets.     Pertinent assessments:A&Ox4, VSS, Tele in place wit A-fib and tachy, afebrile, SOB&MANUEL with activities, O2 2-3LPM NC, dry nonproductive cough, LS diminished. BS, LBM 9/28/21, voids adequately without difficulty. Good appetite. Skin intact.  &345.    Major Shift Events: Admission  Treatment Plan: Remdesivir, decadron, Xarelto, oxygen supplement.   Bedside Nurse: Tejas Winston RN

## 2021-09-29 NOTE — PLAN OF CARE
To Do:  End of Shift Summary  For vital signs and complete assessments, please see documentation flowsheets.     Pertinent assessments: Lung sounds diminished throughout. Pt placed on 4.5 L NC overnight and maintaining O2 in the mid to high 90s. Denies pain. VSS.   Major Shift Events uneventful  Treatment Plan: remdesivir, dexamethasone, xarelto  Bedside Nurse: Magraux Siddiqui RN

## 2021-09-29 NOTE — PLAN OF CARE
End of Shift Summary  For vital signs and complete assessments, please see documentation flowsheets.     Pertinent assessments:  A&O4, up with SBA, Tele A-fib controlled by Tele tech, Lung sounds diminished throughout, MANUEL with activities, on O2 3LPM NC, dry nonproductive cough. Denies pain. Good appetite.     Major Shift Events uneventful  Treatment Plan: remdesivir, dexamethasone, xarelto  Bedside Nurse: Tejas GARCIA RN

## 2021-09-30 LAB
ALBUMIN SERPL-MCNC: 2.8 G/DL (ref 3.4–5)
ALP SERPL-CCNC: 53 U/L (ref 40–150)
ALT SERPL W P-5'-P-CCNC: 36 U/L (ref 0–70)
ANION GAP SERPL CALCULATED.3IONS-SCNC: 6 MMOL/L (ref 3–14)
AST SERPL W P-5'-P-CCNC: 44 U/L (ref 0–45)
BILIRUB SERPL-MCNC: 0.5 MG/DL (ref 0.2–1.3)
BUN SERPL-MCNC: 30 MG/DL (ref 7–30)
CALCIUM SERPL-MCNC: 8.3 MG/DL (ref 8.5–10.1)
CHLORIDE BLD-SCNC: 105 MMOL/L (ref 94–109)
CO2 SERPL-SCNC: 27 MMOL/L (ref 20–32)
CREAT SERPL-MCNC: 0.97 MG/DL (ref 0.66–1.25)
CRP SERPL-MCNC: 18.1 MG/L (ref 0–8)
ERYTHROCYTE [DISTWIDTH] IN BLOOD BY AUTOMATED COUNT: 13.3 % (ref 10–15)
GFR SERPL CREATININE-BSD FRML MDRD: 73 ML/MIN/1.73M2
GLUCOSE BLD-MCNC: 166 MG/DL (ref 70–99)
GLUCOSE BLDC GLUCOMTR-MCNC: 139 MG/DL (ref 70–99)
GLUCOSE BLDC GLUCOMTR-MCNC: 197 MG/DL (ref 70–99)
GLUCOSE BLDC GLUCOMTR-MCNC: 240 MG/DL (ref 70–99)
GLUCOSE BLDC GLUCOMTR-MCNC: 295 MG/DL (ref 70–99)
GLUCOSE BLDC GLUCOMTR-MCNC: 329 MG/DL (ref 70–99)
HCT VFR BLD AUTO: 41.6 % (ref 40–53)
HGB BLD-MCNC: 14.2 G/DL (ref 13.3–17.7)
MCH RBC QN AUTO: 33.3 PG (ref 26.5–33)
MCHC RBC AUTO-ENTMCNC: 34.1 G/DL (ref 31.5–36.5)
MCV RBC AUTO: 97 FL (ref 78–100)
PLATELET # BLD AUTO: 130 10E3/UL (ref 150–450)
POTASSIUM BLD-SCNC: 3.6 MMOL/L (ref 3.4–5.3)
PROT SERPL-MCNC: 7 G/DL (ref 6.8–8.8)
RBC # BLD AUTO: 4.27 10E6/UL (ref 4.4–5.9)
SODIUM SERPL-SCNC: 138 MMOL/L (ref 133–144)
WBC # BLD AUTO: 11.1 10E3/UL (ref 4–11)

## 2021-09-30 PROCEDURE — 250N000013 HC RX MED GY IP 250 OP 250 PS 637: Performed by: INTERNAL MEDICINE

## 2021-09-30 PROCEDURE — 250N000012 HC RX MED GY IP 250 OP 636 PS 637: Performed by: HOSPITALIST

## 2021-09-30 PROCEDURE — 99233 SBSQ HOSP IP/OBS HIGH 50: CPT | Performed by: HOSPITALIST

## 2021-09-30 PROCEDURE — 85014 HEMATOCRIT: CPT | Performed by: HOSPITALIST

## 2021-09-30 PROCEDURE — 120N000001 HC R&B MED SURG/OB

## 2021-09-30 PROCEDURE — 36415 COLL VENOUS BLD VENIPUNCTURE: CPT | Performed by: HOSPITALIST

## 2021-09-30 PROCEDURE — 250N000013 HC RX MED GY IP 250 OP 250 PS 637: Performed by: HOSPITALIST

## 2021-09-30 PROCEDURE — 258N000003 HC RX IP 258 OP 636: Performed by: INTERNAL MEDICINE

## 2021-09-30 PROCEDURE — 250N000009 HC RX 250: Performed by: INTERNAL MEDICINE

## 2021-09-30 PROCEDURE — 82040 ASSAY OF SERUM ALBUMIN: CPT | Performed by: HOSPITALIST

## 2021-09-30 PROCEDURE — 250N000011 HC RX IP 250 OP 636: Performed by: INTERNAL MEDICINE

## 2021-09-30 PROCEDURE — 86140 C-REACTIVE PROTEIN: CPT | Performed by: HOSPITALIST

## 2021-09-30 RX ADMIN — REMDESIVIR 100 MG: 100 INJECTION, POWDER, LYOPHILIZED, FOR SOLUTION INTRAVENOUS at 18:22

## 2021-09-30 RX ADMIN — LEVOTHYROXINE SODIUM 112 MCG: 0.11 TABLET ORAL at 08:39

## 2021-09-30 RX ADMIN — METOPROLOL TARTRATE 25 MG: 25 TABLET, FILM COATED ORAL at 22:24

## 2021-09-30 RX ADMIN — DEXAMETHASONE SODIUM PHOSPHATE 6 MG: 4 INJECTION, SOLUTION INTRAMUSCULAR; INTRAVENOUS at 08:39

## 2021-09-30 RX ADMIN — METOPROLOL TARTRATE 25 MG: 25 TABLET, FILM COATED ORAL at 08:40

## 2021-09-30 RX ADMIN — SIMVASTATIN 40 MG: 40 TABLET, FILM COATED ORAL at 22:23

## 2021-09-30 RX ADMIN — INSULIN ASPART 2 UNITS: 100 INJECTION, SOLUTION INTRAVENOUS; SUBCUTANEOUS at 13:40

## 2021-09-30 RX ADMIN — RIVAROXABAN 20 MG: 20 TABLET, FILM COATED ORAL at 18:25

## 2021-09-30 RX ADMIN — INSULIN ASPART 4 UNITS: 100 INJECTION, SOLUTION INTRAVENOUS; SUBCUTANEOUS at 18:34

## 2021-09-30 RX ADMIN — ASPIRIN 81 MG: 81 TABLET, COATED ORAL at 08:39

## 2021-09-30 RX ADMIN — FAMOTIDINE 20 MG: 20 TABLET ORAL at 08:39

## 2021-09-30 RX ADMIN — INSULIN GLARGINE 14 UNITS: 100 INJECTION, SOLUTION SUBCUTANEOUS at 09:15

## 2021-09-30 ASSESSMENT — MIFFLIN-ST. JEOR: SCORE: 1577.17

## 2021-09-30 ASSESSMENT — ACTIVITIES OF DAILY LIVING (ADL)
ADLS_ACUITY_SCORE: 8
ADLS_ACUITY_SCORE: 8
ADLS_ACUITY_SCORE: 4
ADLS_ACUITY_SCORE: 8

## 2021-09-30 NOTE — PLAN OF CARE
Summary:   COVID-19: POSITIVE (9/25)  Admitting Diagnosis: TITUS, PNA   Pertinent PMH: DM2, HTN, HLD, BPH, Hypothyroidism, ABDIEL, Peripheral Neuropathy , Pt. Is on 3L oxygen.  Mobility: SBA   Diet: Regular     Consults: none   LDAs: PIV   Alarms/Safety: Bed Alarm  Pertinent Labs/Imaging: Creat 1.82,   Isolation Status: Special Precautions-COVID-19   Telemetry: Yes    Expected Discharge Date/Time: TBD   Transportation: TBD   Discharge Disposition: Home   Current Living Situation: Home with Spouse (who is in Room 523)  Bed side nurse: Chana Zafar

## 2021-09-30 NOTE — PROGRESS NOTES
[facility]    Hospitalist Progress Note      Assessment & Plan   Mark Shultz is a 81 year old male with PMH including BPH, relatively recent diagnosis of atrial fibrillation/flutter less than 4 weeks ago, sleep apnea, type 2 diabetes, hypertension who presents with cough, malaise, shortness of breath and rapid heart rate.      #Acute hypoxemic respiratory failure secondary to COVID-19 PNA: Patient's wife is currently admitted here with Covid-19 pneumonia as well.  He is vaccinated with [employer]ul [employer] Mukul vaccine.  He has had multiple visits to the ER over the past month including 1 to 3 days prior to admission where he was felt to be slightly hypervolemic and started on Lasix.  In the ER, patient hypoxemic to the mid 80s with low-grade fever.  CT of the chest consistent with Covid-19 pneumonia.  -Continuous pulse oximetry.  Wean oxygen as able.  -Started on remdesivir and dexamethasone therapy.  We will continue.  -prn anti-tussives  -Xarelto for a/c  -Follow inflammatory markers, oxygen needs, etc.    -Maintain precautions.     #Acute kidney injury: Resolved.  Presents with creatinine of 1.82 with BUN of 41, up from creatinine of 1.03 days ago with BUN of 16 at that time.  Was started on diuretics and is also on multiple potentially nephrotoxic agents including HCTZ and losartan  -Renal function improving.  -Hold home hydrochlorothiazide and losartan  -Hold further IV maintenance fluids given Covid-19 pneumonia.  Bolus as needed for low blood pressures.     #Aflutter, afib: Diagnosed a month ago.  We will pursue rate control strategy and treat infection as above.  TTE obtained showed EF 45-50% which was mildly reduced from prior with global hypokinesia.  Denies any chest pain or discomfort.  -Metoprolol 25 mg twice daily  -IV metoprolol 5 mg as needed for rates greater than 120  -Continue Xarelto     #Type 2 diabetes: There is the potential for hyperglycemia in the setting of steroid use.   Increasing lantus to 14 unit daily given hyperglycemia.  Hypoglycemia protocol and sliding scale insulin.       #ABDIEL: Does not use CPAP at home currently but did at 1 point.  Will offer CPAP while here.  This could be part of the reason he is having issues with atrial arrhythmias recently.  Educated  #Hypothyroidism: Resume home Synthroid  #History of hypertension: Due to acute kidney injury will hold HCTZ and losartan.  Continue metoprolol.  # Detectable troponin: 0.039: May be somewhat worse due to acute kidney failure and also demand ischemia in the setting of hypoxic respiratory failure.  Troponin repeat negative.  TTE as above. No chest pain. Continuing aspirin, statin and xarelto.      DVT Prophylaxis: DOAC  Code Status: Full Code  Dispo: Likely several days.     Abe Junior MD  Text Page    Interval History   No events.  Patient again expressing that his chief concern is his wife who is down the [place].  He tells me that she is being discharged and he is appealing given nobody able to take care of her.  He states that if she is discharged he would likely leave AGAINST MEDICAL ADVICE.  I did advise him of the risks of leaving the hospital with Covid-19 pneumonia including worsening respiratory status and possible death.  I did tell him that oftentimes people have worsening respiratory status with Covid-19 pneumonia and need increased oxygen.  If he is at home this cannot not be closely monitor the way it needs to be.  He did voice understanding of this and wants to stay in the hospital but if his wife goes home he is thinking he will leave AMA.    He denies any chest pain or worsening shortness of breath.  No worsening cough.  No nausea vomiting or abdominal pain.    -Data reviewed today: I reviewed all new labs and imaging results over the last 24 hours.    Physical Exam   Temp: 98.2  F (36.8  C) Temp src: Oral BP: 112/81 Pulse: 90   Resp: 20 SpO2: 93 % O2 Device: Nasal cannula Oxygen Delivery: 3 LPM  Vitals:     09/28/21 1830 09/29/21 0505 09/30/21 0623   Weight: 90.4 kg (199 lb 6.4 oz) 90.5 kg (199 lb 9.6 oz) 89.8 kg (197 lb 14.4 oz)     Vital Signs with Ranges  Temp:  [97.9  F (36.6  C)-98.8  F (37.1  C)] 98.2  F (36.8  C)  Pulse:  [76-91] 90  Resp:  [16-20] 20  BP: ()/(52-81) 112/81  SpO2:  [84 %-95 %] 93 %  I/O last 3 completed shifts:  In: 240 [P.O.:240]  Out: 200 [Urine:200]    General: Alert, awake, no acute distress.  HEENT: NC/AT, MMM, no oral lesions.    Cardiac: irregular, S1, S2.  No murmurs appreciated.  Pulmonary: Some crackles bilaterally in lower lobes. No wheeze. Nl WOB. Conversational.   Abdomen: soft, non-tender, non-distended.   Extremities: no deformities.  Warm, well perfused.  Skin: no rashes or lesions noted.  Warm and Dry.  Neuro: No focal deficits noted.  Speech clear.  Coordination and strength grossly normal.    Medications     - MEDICATION INSTRUCTIONS -         remdesivir  100 mg Intravenous Q24H    And     sodium chloride 0.9%  50 mL Intravenous Q24H     aspirin  81 mg Oral Daily     dexamethasone  6 mg Intravenous Q24H     famotidine  20 mg Oral Daily     insulin aspart  1-7 Units Subcutaneous TID AC     insulin aspart  1-5 Units Subcutaneous At Bedtime     insulin glargine  14 Units Subcutaneous QAM AC     levothyroxine  112 mcg Oral Daily     metoprolol tartrate  25 mg Oral BID     rivaroxaban ANTICOAGULANT  20 mg Oral Daily with supper     simvastatin  40 mg Oral At Bedtime     sodium chloride (PF)  3 mL Intracatheter Q8H       Data   Recent Labs   Lab 09/30/21  0912 09/30/21  0808 09/30/21  0203 09/29/21  0918 09/29/21  0635 09/28/21  1938 09/28/21  1355 09/25/21  0403 09/25/21  0122 09/25/21  0122   WBC  --  11.1*  --   --   --   --  8.8  --   --  8.0   HGB  --  14.2  --   --   --   --  15.1  --   --  13.2*   MCV  --  97  --   --   --   --  98  --   --  100   PLT  --  130*  --   --   --   --  143*  --   --  163   NA  --  138  --   --  136  --  135  --    < > 139   POTASSIUM  --   3.6  --   --  3.8  --  3.4  --    < > 3.7   CHLORIDE  --  105  --   --  105  --  101  --    < > 107   CO2  --  27  --   --  26  --  25  --    < > 28   BUN  --  30  --   --  37*  --  41*  --    < > 16   CR  --  0.97  --   --  1.26*  --  1.82*  --    < > 1.00   ANIONGAP  --  6  --   --  5  --  9  --    < > 4   BREANNE  --  8.3*  --   --  8.6  --  8.9  --    < > 8.6   * 166* 240*   < > 199*   < > 192*  --    < > 86   ALBUMIN  --  2.8*  --   --   --   --  3.5  --    < > 3.3*  3.4   PROTTOTAL  --  7.0  --   --   --   --  8.0  --    < > 7.3  7.3   BILITOTAL  --  0.5  --   --   --   --  0.7  --    < > 1.1  1.2   ALKPHOS  --  53  --   --   --   --  51  --    < > 52  55   ALT  --  36  --   --   --   --  36  --    < > 16  16   AST  --  44  --   --   --   --  58*  --    < > 13  12   LIPASE  --   --   --   --   --   --   --   --   --  104   TROPONIN  --   --   --   --  <0.015  --  0.039 0.016   < > 0.015    < > = values in this interval not displayed.       No results found for this or any previous visit (from the past 24 hour(s)).

## 2021-09-30 NOTE — PLAN OF CARE
End of Shift Summary  For vital signs and complete assessments, please see documentation flowsheets.     Pertinent assessments:  A&O4,  Tele A-fib controlled by Tele tech, Lung sounds diminished throughout, MANUEL& SOB with activities, desats to 84% RA, currently on O2 2LPM NC with sats, infrequent productive  cough. Denies pain. Good appetite, up with SBA.    Major Shift Events uneventful  Treatment Plan: remdesivir, dexamethasone, xarelto  Bedside Nurse: Tejas GARCIA RN

## 2021-09-30 NOTE — PLAN OF CARE
End of Shift Summary  For vital signs and complete assessments, please see documentation flowsheets.     Pertinent assessments:  A&O4,  Tele A-fib [address], controlled by Tele tech, Lung sounds diminished throughout, MANUEL with activities, on O2 3LPM NC, dry nonproductive cough. Denies pain. Good appetite, up with SBA.    Major Shift Events uneventful  Treatment Plan: remdesivir, dexamethasone, xarelto  Bedside Nurse: Chana Zafar RN

## 2021-10-01 LAB
ANION GAP SERPL CALCULATED.3IONS-SCNC: 5 MMOL/L (ref 3–14)
BUN SERPL-MCNC: 29 MG/DL (ref 7–30)
CALCIUM SERPL-MCNC: 8.6 MG/DL (ref 8.5–10.1)
CHLORIDE BLD-SCNC: 104 MMOL/L (ref 94–109)
CO2 SERPL-SCNC: 30 MMOL/L (ref 20–32)
CREAT SERPL-MCNC: 0.92 MG/DL (ref 0.66–1.25)
ERYTHROCYTE [DISTWIDTH] IN BLOOD BY AUTOMATED COUNT: 13.3 % (ref 10–15)
GFR SERPL CREATININE-BSD FRML MDRD: 78 ML/MIN/1.73M2
GLUCOSE BLD-MCNC: 204 MG/DL (ref 70–99)
GLUCOSE BLDC GLUCOMTR-MCNC: 196 MG/DL (ref 70–99)
GLUCOSE BLDC GLUCOMTR-MCNC: 221 MG/DL (ref 70–99)
GLUCOSE BLDC GLUCOMTR-MCNC: 257 MG/DL (ref 70–99)
GLUCOSE BLDC GLUCOMTR-MCNC: 268 MG/DL (ref 70–99)
GLUCOSE BLDC GLUCOMTR-MCNC: 312 MG/DL (ref 70–99)
HBA1C MFR BLD: 6.8 % (ref 0–5.6)
HCT VFR BLD AUTO: 43.8 % (ref 40–53)
HGB BLD-MCNC: 15.3 G/DL (ref 13.3–17.7)
MCH RBC QN AUTO: 34.1 PG (ref 26.5–33)
MCHC RBC AUTO-ENTMCNC: 34.9 G/DL (ref 31.5–36.5)
MCV RBC AUTO: 98 FL (ref 78–100)
PLATELET # BLD AUTO: 147 10E3/UL (ref 150–450)
POTASSIUM BLD-SCNC: 3.7 MMOL/L (ref 3.4–5.3)
RBC # BLD AUTO: 4.49 10E6/UL (ref 4.4–5.9)
SODIUM SERPL-SCNC: 139 MMOL/L (ref 133–144)
WBC # BLD AUTO: 12.6 10E3/UL (ref 4–11)

## 2021-10-01 PROCEDURE — 250N000013 HC RX MED GY IP 250 OP 250 PS 637: Performed by: INTERNAL MEDICINE

## 2021-10-01 PROCEDURE — 120N000001 HC R&B MED SURG/OB

## 2021-10-01 PROCEDURE — 80048 BASIC METABOLIC PNL TOTAL CA: CPT | Performed by: HOSPITALIST

## 2021-10-01 PROCEDURE — 250N000011 HC RX IP 250 OP 636: Performed by: INTERNAL MEDICINE

## 2021-10-01 PROCEDURE — 83036 HEMOGLOBIN GLYCOSYLATED A1C: CPT | Performed by: INTERNAL MEDICINE

## 2021-10-01 PROCEDURE — 250N000013 HC RX MED GY IP 250 OP 250 PS 637: Performed by: HOSPITALIST

## 2021-10-01 PROCEDURE — 36415 COLL VENOUS BLD VENIPUNCTURE: CPT | Performed by: HOSPITALIST

## 2021-10-01 PROCEDURE — 250N000009 HC RX 250: Performed by: INTERNAL MEDICINE

## 2021-10-01 PROCEDURE — 85014 HEMATOCRIT: CPT | Performed by: HOSPITALIST

## 2021-10-01 PROCEDURE — 99232 SBSQ HOSP IP/OBS MODERATE 35: CPT | Performed by: HOSPITALIST

## 2021-10-01 PROCEDURE — 250N000012 HC RX MED GY IP 250 OP 636 PS 637: Performed by: HOSPITALIST

## 2021-10-01 PROCEDURE — 258N000003 HC RX IP 258 OP 636: Performed by: INTERNAL MEDICINE

## 2021-10-01 RX ADMIN — INSULIN ASPART 2 UNITS: 100 INJECTION, SOLUTION INTRAVENOUS; SUBCUTANEOUS at 08:49

## 2021-10-01 RX ADMIN — METOPROLOL TARTRATE 25 MG: 25 TABLET, FILM COATED ORAL at 08:48

## 2021-10-01 RX ADMIN — RIVAROXABAN 20 MG: 20 TABLET, FILM COATED ORAL at 16:36

## 2021-10-01 RX ADMIN — DEXAMETHASONE SODIUM PHOSPHATE 6 MG: 4 INJECTION, SOLUTION INTRAMUSCULAR; INTRAVENOUS at 08:48

## 2021-10-01 RX ADMIN — REMDESIVIR 100 MG: 100 INJECTION, POWDER, LYOPHILIZED, FOR SOLUTION INTRAVENOUS at 16:38

## 2021-10-01 RX ADMIN — SODIUM CHLORIDE 50 ML: 9 INJECTION, SOLUTION INTRAVENOUS at 16:37

## 2021-10-01 RX ADMIN — SIMVASTATIN 40 MG: 40 TABLET, FILM COATED ORAL at 21:48

## 2021-10-01 RX ADMIN — METOPROLOL TARTRATE 25 MG: 25 TABLET, FILM COATED ORAL at 20:33

## 2021-10-01 RX ADMIN — INSULIN ASPART 2 UNITS: 100 INJECTION, SOLUTION INTRAVENOUS; SUBCUTANEOUS at 17:55

## 2021-10-01 RX ADMIN — LEVOTHYROXINE SODIUM 112 MCG: 0.11 TABLET ORAL at 08:48

## 2021-10-01 RX ADMIN — INSULIN ASPART 4 UNITS: 100 INJECTION, SOLUTION INTRAVENOUS; SUBCUTANEOUS at 13:52

## 2021-10-01 RX ADMIN — FAMOTIDINE 20 MG: 20 TABLET ORAL at 08:48

## 2021-10-01 RX ADMIN — INSULIN GLARGINE 14 UNITS: 100 INJECTION, SOLUTION SUBCUTANEOUS at 08:48

## 2021-10-01 ASSESSMENT — ACTIVITIES OF DAILY LIVING (ADL)
ADLS_ACUITY_SCORE: 6
ADLS_ACUITY_SCORE: 4
ADLS_ACUITY_SCORE: 6

## 2021-10-01 ASSESSMENT — MIFFLIN-ST. JEOR: SCORE: 1574.45

## 2021-10-01 NOTE — PLAN OF CARE
Pt is stable hospital day 3, with positive covid having  SOB, oxygen needs.  Currently on 3L 02, using IS successfully.  Tolerates meals, but refused lunch today.  , 312. Tele is A fib in the 80s. WBC 12.6, A1C 6.8.

## 2021-10-01 NOTE — PLAN OF CARE
For vital signs and complete assessments, please see documentation flowsheets.     Pertinent assessments: A&Ox4, forgetful at times. VSS on 3L NC. O2 dropped when ambulating to bathroom but pt recovered quickly. Denies pain, N/V, SOB. Dyspnea on exertion noted. Infrequent cough. No PRN meds given. .    Major Shift Events: None.     Treatment Plan: remdesivir, dexamethasone, xarelto    Margaux Armijo RN

## 2021-10-01 NOTE — PROGRESS NOTES
[facility]    Hospitalist Progress Note             Date of Admission:  9/28/2021                   Day of hospitalization: 3    Assessment and Plan:      Mark Shultz is a 81 year old male with PMH including BPH, relatively recent diagnosis of atrial fibrillation/flutter less than 4 weeks ago, sleep apnea, type 2 diabetes, hypertension who presents with cough, malaise, shortness of breath and rapid heart rate.      #Acute hypoxemic respiratory failure secondary to COVID-19 PNA: Patient's wife is currently admitted here with Covid-19 pneumonia as well.  He is vaccinated with Mukul & Mukul vaccine.  He has had multiple visits to the ER over the past month including 1 to 3 days prior to admission where he was felt to be slightly hypervolemic and started on Lasix.  In the ER, patient hypoxemic to the mid 80s with low-grade fever.  CT of the chest consistent with Covid-19 pneumonia.  -Continuous pulse oximetry.  Wean oxygen as able.  -Started on remdesivir (9/28) and dexamethasone therapy (9/28).  We will continue.  -prn anti-tussives  -Xarelto for a/c  -Follow inflammatory markers, oxygen needs, etc.  -Maintain precautions.     #Leukocytosis  -Most likely related to steroids as his clinical status seems to be improving    #Acute kidney injury: Presents with creatinine of 1.82 with BUN of 41, up from creatinine of 1.03 days ago with BUN of 16 at that time.  Was started on diuretics and is also on multiple potentially nephrotoxic agents including HCTZ and losartan  -Renal function improving.  -Hold home hydrochlorothiazide and losartan  -Hold further IV maintenance fluids given Covid-19 pneumonia.  Bolus as needed for low blood pressures.     #Aflutter: Diagnosed a month ago.  We will pursue rate control strategy and treat infection as above.  TTE obtained showed EF 45-50% which was mildly reduced from prior with global hypokinesia.  Denies any chest pain or discomfort.  -Metoprolol 25 mg  "twice daily  -IV metoprolol 5 mg as needed for rates greater than 120  -Continue Xarelto     #Type 2 diabetes: There is the potential for hyperglycemia in the setting of steroid use.  Lantus changed to 17 units tomorrow in the morning and have sliding scale available.  Titrate as needed.  Blood sugars seem better this afternoon.     #ABDIEL: Does not use CPAP at home currently but did at 1 point.  Will offer CPAP while here.  This could be part of the reason he is having issues with atrial arrhythmias recently.  Educated  #Hypothyroidism: Resume home Synthroid  #History of hypertension: Due to acute kidney injury will hold HCTZ and losartan.  Continue metoprolol.  # Detectable troponin: 0.039: May be somewhat worse due to acute kidney failure and also demand ischemia in the setting of hypoxic respiratory failure.  Troponin repeat negative.  TTE as above. No chest pain. Continuing aspirin, statin and xarelto.   Liane Hayden MD  Text Page (7am - 6pm, M-F)               Subjective   Chief Complaint: sob  Subjective: No complaints today only concerned about his wife's being discharged from the hospital and having to live alone at home without his care.  Chest pain shortness of breath fevers chills does have mild cough     Objective   /75 (BP Location: Right arm)   Pulse 91   Temp 97.7  F (36.5  C) (Oral)   Resp 18   Ht 1.727 m (5' 8\")   Wt 89.5 kg (197 lb 4.8 oz)   SpO2 97%   BMI 30.00 kg/m       Physical Exam  General: Pt in NAD, normal appearance  HEENT: OP clear MMM, no JVD  Lungs: bibasilar crackles, normal breathing  without accessory muscle usage, no wheezing, rhonchi or crackles  Cardiac: +S1, S2, RRR, no MRG, no edema  Abdominal: normal bowel sounds, NT/ND, no hepatosplenomegaly  Skin: warm, dry, normal turgor, no rash  Psyche: A& O x3, appropriate affect             Intake/Output Summary (Last 24 hours) at 10/1/2021 1506  Last data filed at 10/1/2021 0957  Gross per 24 hour   Intake 720 ml   Output " --   Net 720 ml           Labs and Imaging Results:      Recent Labs   Lab 10/01/21  0937 09/30/21  0808   WBC 12.6* 11.1*   HGB 15.3 14.2   * 130*        Recent Labs   Lab 10/01/21  0937 09/30/21  0808    138   CO2 30 27   BUN 29 30      No results for input(s): INR, PTT in the last 168 hours.   No results for input(s): CKMB in the last 168 hours.    Invalid input(s): TROPONINT     Recent Labs   Lab 09/30/21  0808 09/28/21  1355   ALBUMIN 2.8* 3.5   AST 44 58*   ALT 36 36   ALKPHOS 53 51        Micro:     Radio:  Echo Limited   Final Result      CT Chest w/o Contrast   Final Result   IMPRESSION:    1.  New patchy groundglass infiltrates throughout both lungs very consistent with COVID pneumonia.         XR Chest Port 1 View   Final Result   IMPRESSION: Lungs clear. No pneumothorax. Heart size has decreased   slightly since the previous exam. Pulmonary vascularity is within   normal limits. Aortic calcification.      JAMIE BRANCH MD            SYSTEM ID:  [id number]              Medications:      Scheduled Meds:      remdesivir  100 mg Intravenous Q24H    And     sodium chloride 0.9%  50 mL Intravenous Q24H     aspirin  81 mg Oral Daily     dexamethasone  6 mg Intravenous Q24H     famotidine  20 mg Oral Daily     insulin aspart  1-7 Units Subcutaneous TID AC     insulin aspart  1-5 Units Subcutaneous At Bedtime     insulin glargine  14 Units Subcutaneous QAM AC     levothyroxine  112 mcg Oral Daily     metoprolol tartrate  25 mg Oral BID     rivaroxaban ANTICOAGULANT  20 mg Oral Daily with supper     simvastatin  40 mg Oral At Bedtime     sodium chloride (PF)  3 mL Intracatheter Q8H     Continuous Infusions:      - MEDICATION INSTRUCTIONS -       PRN Meds:  acetaminophen **OR** acetaminophen, benzonatate, glucose **OR** dextrose **OR** glucagon, guaiFENesin-dextromethorphan, lidocaine 4%, lidocaine (buffered or not buffered), melatonin, metoprolol, ondansetron **OR** ondansetron, - MEDICATION  INSTRUCTIONS -, polyethylene glycol, senna-docusate **OR** senna-docusate, sodium chloride (PF)

## 2021-10-01 NOTE — PLAN OF CARE
To Do:  End of Shift Summary  For vital signs and complete assessments, please see documentation flowsheets.     Pertinent assessments:  A&O4 and forgetful. VSS, up with SBA. BG were 329 and 295. Fair appetite. Good, productive cough. Clear lung sounds.    Major Shift Events: Increased O2 to 3LO2.    Treatment Plan: remdesivir, dexamethasone, xarelto    Bedside Nurse: Catalina Granados RN

## 2021-10-02 LAB
GLUCOSE BLDC GLUCOMTR-MCNC: 202 MG/DL (ref 70–99)
GLUCOSE BLDC GLUCOMTR-MCNC: 259 MG/DL (ref 70–99)
GLUCOSE BLDC GLUCOMTR-MCNC: 263 MG/DL (ref 70–99)
GLUCOSE BLDC GLUCOMTR-MCNC: 270 MG/DL (ref 70–99)
GLUCOSE BLDC GLUCOMTR-MCNC: 303 MG/DL (ref 70–99)

## 2021-10-02 PROCEDURE — 250N000012 HC RX MED GY IP 250 OP 636 PS 637: Performed by: HOSPITALIST

## 2021-10-02 PROCEDURE — 250N000013 HC RX MED GY IP 250 OP 250 PS 637: Performed by: INTERNAL MEDICINE

## 2021-10-02 PROCEDURE — 250N000009 HC RX 250: Performed by: INTERNAL MEDICINE

## 2021-10-02 PROCEDURE — 250N000013 HC RX MED GY IP 250 OP 250 PS 637: Performed by: HOSPITALIST

## 2021-10-02 PROCEDURE — 120N000001 HC R&B MED SURG/OB

## 2021-10-02 PROCEDURE — 258N000003 HC RX IP 258 OP 636: Performed by: INTERNAL MEDICINE

## 2021-10-02 PROCEDURE — 99233 SBSQ HOSP IP/OBS HIGH 50: CPT | Performed by: HOSPITALIST

## 2021-10-02 PROCEDURE — 250N000011 HC RX IP 250 OP 636: Performed by: INTERNAL MEDICINE

## 2021-10-02 RX ORDER — FAMOTIDINE 20 MG/1
20 TABLET, FILM COATED ORAL 2 TIMES DAILY
Status: DISCONTINUED | OUTPATIENT
Start: 2021-10-02 | End: 2021-10-04 | Stop reason: HOSPADM

## 2021-10-02 RX ADMIN — FAMOTIDINE 20 MG: 20 TABLET ORAL at 22:25

## 2021-10-02 RX ADMIN — FAMOTIDINE 20 MG: 20 TABLET ORAL at 08:35

## 2021-10-02 RX ADMIN — SODIUM CHLORIDE 50 ML: 9 INJECTION, SOLUTION INTRAVENOUS at 17:07

## 2021-10-02 RX ADMIN — RIVAROXABAN 20 MG: 20 TABLET, FILM COATED ORAL at 17:08

## 2021-10-02 RX ADMIN — METFORMIN HYDROCHLORIDE 1000 MG: 500 TABLET, FILM COATED ORAL at 17:08

## 2021-10-02 RX ADMIN — INSULIN GLARGINE 17 UNITS: 100 INJECTION, SOLUTION SUBCUTANEOUS at 08:35

## 2021-10-02 RX ADMIN — DEXAMETHASONE SODIUM PHOSPHATE 6 MG: 4 INJECTION, SOLUTION INTRAMUSCULAR; INTRAVENOUS at 08:35

## 2021-10-02 RX ADMIN — INSULIN ASPART 3 UNITS: 100 INJECTION, SOLUTION INTRAVENOUS; SUBCUTANEOUS at 12:16

## 2021-10-02 RX ADMIN — LEVOTHYROXINE SODIUM 112 MCG: 0.11 TABLET ORAL at 08:35

## 2021-10-02 RX ADMIN — METOPROLOL TARTRATE 25 MG: 25 TABLET, FILM COATED ORAL at 22:25

## 2021-10-02 RX ADMIN — INSULIN ASPART 4 UNITS: 100 INJECTION, SOLUTION INTRAVENOUS; SUBCUTANEOUS at 18:08

## 2021-10-02 RX ADMIN — INSULIN ASPART 2 UNITS: 100 INJECTION, SOLUTION INTRAVENOUS; SUBCUTANEOUS at 08:34

## 2021-10-02 RX ADMIN — METOPROLOL TARTRATE 25 MG: 25 TABLET, FILM COATED ORAL at 08:35

## 2021-10-02 RX ADMIN — SIMVASTATIN 40 MG: 40 TABLET, FILM COATED ORAL at 22:25

## 2021-10-02 RX ADMIN — REMDESIVIR 100 MG: 100 INJECTION, POWDER, LYOPHILIZED, FOR SOLUTION INTRAVENOUS at 17:06

## 2021-10-02 ASSESSMENT — ACTIVITIES OF DAILY LIVING (ADL)
ADLS_ACUITY_SCORE: 6

## 2021-10-02 ASSESSMENT — MIFFLIN-ST. JEOR: SCORE: 1604.84

## 2021-10-02 NOTE — PLAN OF CARE
End of Shift Summary  For vital signs and complete assessments, please see documentation flowsheets.     Pertinent assessments: A&O, forgetful at times. VSS on 3L NC. O2 sats drop after ambulating to the bathroom but pt recovers quickly. Denies pain, nausea.  Infrequent cough.     Major Shift Events: None.     Treatment Plan: remdesivir, dexamethasone, xarelto    Bedside Nurse: Liv Reyes RN

## 2021-10-02 NOTE — PROGRESS NOTES
[facility]    Hospitalist Progress Note             Date of Admission:  9/28/2021                   Day of hospitalization: 4    Assessment and Plan:      Mark Shultz is a 81 year old male with PMH including BPH, relatively recent diagnosis of atrial fibrillation/flutter less than 4 weeks ago, sleep apnea, type 2 diabetes, hypertension who presents with cough, malaise, shortness of breath and rapid heart rate.      #Acute hypoxemic respiratory failure secondary to COVID-19 PNA: Patient's wife is currently admitted here with Covid-19 pneumonia as well.  He is vaccinated with Mukul [employer] Mukul vaccine.  He has had multiple visits to the ER over the past month including 1 to 3 days prior to admission where he was felt to be slightly hypervolemic and started on Lasix.  In the ER, patient hypoxemic to the mid 80s with low-grade fever.  CT of the chest consistent with Covid-19 pneumonia.  -Continuous pulse oximetry.  Wean oxygen as able.  -Started on remdesivir (9/28) and dexamethasone therapy (9/28).  We will continue.  -prn anti-tussives  -Xarelto for a/c  -Follow inflammatory markers, oxygen needs, etc.  -Maintain precautions.     #Leukocytosis  -Most likely related to steroids as his clinical status seems to be improving    #Acute kidney injury: Presents with creatinine of 1.82 with BUN of 41, up from creatinine of 1.03 days ago with BUN of 16 at that time.  Was started on diuretics and is also on multiple potentially nephrotoxic agents including HCTZ and losartan  -Renal function improving.  -Hold home hydrochlorothiazide and losartan  -Hold further IV maintenance fluids given Covid-19 pneumonia.  Bolus as needed for low blood pressures.     #Aflutter: Diagnosed a month ago.  We will pursue rate control strategy and treat infection as above.  TTE obtained showed EF 45-50% which was mildly reduced from prior with global hypokinesia.  Denies any chest pain or discomfort.  -Metoprolol 25 mg  "twice daily  -IV metoprolol 5 mg as needed for rates greater than 120  -Continue Xarelto     #Type 2 diabetes: There is the potential for hyperglycemia in the setting of steroid use.  Lantus changed to 17 units tomorrow in the morning and have sliding scale available.  Titrate as needed.  Started on metformin as well.     #ABDIEL: Does not use CPAP at home currently but did at 1 point.  Will offer CPAP while here.  This could be part of the reason he is having issues with atrial arrhythmias recently.  Educated  #Hypothyroidism: Resume home Synthroid  #History of hypertension: Due to acute kidney injury will hold HCTZ and losartan.  Continue metoprolol.  # Detectable troponin: 0.039: May be somewhat worse due to acute kidney failure and also demand ischemia in the setting of hypoxic respiratory failure.  Troponin repeat negative.  TTE as above. No chest pain. Continuing aspirin, statin and xarelto.   Liane Hayden MD  [employer] (7am - 6pm, M-F)               Subjective   Chief Complaint: sob  Subjective: No complaints today only concerned about his wife's being discharged from the hospital today and having to live alone at home without his care.  No Chest pain shortness of breath fevers chills does have mild cough     Objective   /80 (BP Location: Right arm)   Pulse 77   Temp 97.6  F (36.4  C) (Oral)   Resp 18   Ht 1.727 m (5' 8\")   Wt 92.5 kg (204 lb)   SpO2 90%   BMI 31.02 kg/m       Physical Exam  General: Pt in NAD, normal appearance  HEENT: OP clear MMM, no JVD  Lungs: bibasilar crackles, normal breathing  without accessory muscle usage, no wheezing, rhonchi or crackles  Cardiac: +S1, S2, RRR, no MRG, no edema  Abdominal: normal bowel sounds, NT/ND, no hepatosplenomegaly  Skin: warm, dry, normal turgor, no rash  Psyche: A& O x3, appropriate affect             Intake/Output Summary (Last 24 hours) at 10/1/2021 1506  Last data filed at 10/1/2021 0957  Gross per 24 hour   Intake 720 ml   Output --   Net " 720 ml           Labs and Imaging Results:      Recent Labs   Lab 10/01/21  0937 09/30/21  0808   WBC 12.6* 11.1*   HGB 15.3 14.2   * 130*        Recent Labs   Lab 10/01/21  0937 09/30/21  0808    138   CO2 30 27   BUN 29 30      No results for input(s): INR, PTT in the last 168 hours.   No results for input(s): CKMB in the last 168 hours.    Invalid input(s): TROPONINT     Recent Labs   Lab 09/30/21  0808 09/28/21  1355   ALBUMIN 2.8* 3.5   AST 44 58*   ALT 36 36   ALKPHOS 53 51        Micro:     Radio:  Echo Limited   Final Result      CT Chest w/o Contrast   Final Result   IMPRESSION:    1.  New patchy groundglass infiltrates throughout both lungs very consistent with COVID pneumonia.         XR Chest Port 1 View   Final Result   IMPRESSION: Lungs clear. No pneumothorax. Heart size has decreased   slightly since the previous exam. Pulmonary vascularity is within   normal limits. Aortic calcification.      JAMIE BRANCH MD            SYSTEM ID:  [id number]              Medications:      Scheduled Meds:      remdesivir  100 mg Intravenous Q24H    And     sodium chloride 0.9%  50 mL Intravenous Q24H     aspirin  81 mg Oral Daily     dexamethasone  6 mg Intravenous Q24H     famotidine  20 mg Oral Daily     insulin aspart  1-7 Units Subcutaneous TID AC     insulin aspart  1-5 Units Subcutaneous At Bedtime     insulin glargine  17 Units Subcutaneous QAM AC     levothyroxine  112 mcg Oral Daily     metFORMIN  1,000 mg Oral BID w/meals     metoprolol tartrate  25 mg Oral BID     rivaroxaban ANTICOAGULANT  20 mg Oral Daily with supper     simvastatin  40 mg Oral At Bedtime     sodium chloride (PF)  3 mL Intracatheter Q8H     Continuous Infusions:      - MEDICATION INSTRUCTIONS -       PRN Meds:  acetaminophen **OR** acetaminophen, benzonatate, glucose **OR** dextrose **OR** glucagon, guaiFENesin-dextromethorphan, lidocaine 4%, lidocaine (buffered or not buffered), melatonin, metoprolol, ondansetron **OR**  ondansetron, - MEDICATION INSTRUCTIONS -, polyethylene glycol, senna-docusate **OR** senna-docusate, sodium chloride (PF)

## 2021-10-02 NOTE — PLAN OF CARE
"Pt remained on 3L 02, but required increase to 4L while sleeping.  He is coughing up slightly blood tinged/creamy yellow sputum today and coughing often.  Using IS independently with 2000+ volumes.  Eating, voiding without difficulty.  Tele is A fib, CVR.  /57 (BP Location: Left arm)   Pulse 77   Temp 97.6  F (36.4  C) (Oral)   Resp 16   Ht 1.727 m (5' 8\")   Wt 92.5 kg (204 lb)   SpO2 90%   BMI 31.02 kg/m     "

## 2021-10-02 NOTE — PLAN OF CARE
End of Shift Summary  For vital signs and complete assessments, please see documentation flowsheets.     Pertinent assessments: A&Ox4, forgetful at times. VSS on 4L NC. O2 dropped when ambulating to bathroom but pt recovered quickly. Denies pain, N/V, SOB. Dyspnea on exertion noted. Infrequent cough. No PRN meds given. .257. ate dinner 85% of his meal.    Major Shift Events: None.     Treatment Plan: remdesivir, dexamethasone, xarelto    Bedside Nurse: Chana Zafar RN

## 2021-10-03 LAB
ANION GAP SERPL CALCULATED.3IONS-SCNC: 6 MMOL/L (ref 3–14)
BUN SERPL-MCNC: 24 MG/DL (ref 7–30)
CALCIUM SERPL-MCNC: 8.5 MG/DL (ref 8.5–10.1)
CHLORIDE BLD-SCNC: 102 MMOL/L (ref 94–109)
CO2 SERPL-SCNC: 30 MMOL/L (ref 20–32)
CREAT SERPL-MCNC: 0.85 MG/DL (ref 0.66–1.25)
ERYTHROCYTE [DISTWIDTH] IN BLOOD BY AUTOMATED COUNT: 13.1 % (ref 10–15)
GFR SERPL CREATININE-BSD FRML MDRD: 82 ML/MIN/1.73M2
GLUCOSE BLD-MCNC: 206 MG/DL (ref 70–99)
GLUCOSE BLDC GLUCOMTR-MCNC: 182 MG/DL (ref 70–99)
GLUCOSE BLDC GLUCOMTR-MCNC: 216 MG/DL (ref 70–99)
GLUCOSE BLDC GLUCOMTR-MCNC: 291 MG/DL (ref 70–99)
GLUCOSE BLDC GLUCOMTR-MCNC: 329 MG/DL (ref 70–99)
GLUCOSE BLDC GLUCOMTR-MCNC: 345 MG/DL (ref 70–99)
HCT VFR BLD AUTO: 43.5 % (ref 40–53)
HGB BLD-MCNC: 14.5 G/DL (ref 13.3–17.7)
MCH RBC QN AUTO: 32.1 PG (ref 26.5–33)
MCHC RBC AUTO-ENTMCNC: 33.3 G/DL (ref 31.5–36.5)
MCV RBC AUTO: 96 FL (ref 78–100)
PLATELET # BLD AUTO: 190 10E3/UL (ref 150–450)
POTASSIUM BLD-SCNC: 3.5 MMOL/L (ref 3.4–5.3)
RBC # BLD AUTO: 4.52 10E6/UL (ref 4.4–5.9)
SODIUM SERPL-SCNC: 138 MMOL/L (ref 133–144)
WBC # BLD AUTO: 13.5 10E3/UL (ref 4–11)

## 2021-10-03 PROCEDURE — 99232 SBSQ HOSP IP/OBS MODERATE 35: CPT | Performed by: HOSPITALIST

## 2021-10-03 PROCEDURE — 250N000013 HC RX MED GY IP 250 OP 250 PS 637: Performed by: INTERNAL MEDICINE

## 2021-10-03 PROCEDURE — 250N000012 HC RX MED GY IP 250 OP 636 PS 637: Performed by: HOSPITALIST

## 2021-10-03 PROCEDURE — 250N000011 HC RX IP 250 OP 636: Performed by: INTERNAL MEDICINE

## 2021-10-03 PROCEDURE — 250N000013 HC RX MED GY IP 250 OP 250 PS 637: Performed by: HOSPITALIST

## 2021-10-03 PROCEDURE — 36415 COLL VENOUS BLD VENIPUNCTURE: CPT | Performed by: HOSPITALIST

## 2021-10-03 PROCEDURE — 120N000001 HC R&B MED SURG/OB

## 2021-10-03 PROCEDURE — 85027 COMPLETE CBC AUTOMATED: CPT | Performed by: HOSPITALIST

## 2021-10-03 PROCEDURE — 80048 BASIC METABOLIC PNL TOTAL CA: CPT | Performed by: HOSPITALIST

## 2021-10-03 RX ORDER — GLIPIZIDE 5 MG/1
20 TABLET, FILM COATED, EXTENDED RELEASE ORAL DAILY
Status: DISCONTINUED | OUTPATIENT
Start: 2021-10-03 | End: 2021-10-04 | Stop reason: HOSPADM

## 2021-10-03 RX ADMIN — SIMVASTATIN 40 MG: 40 TABLET, FILM COATED ORAL at 22:04

## 2021-10-03 RX ADMIN — METFORMIN HYDROCHLORIDE 1000 MG: 500 TABLET, FILM COATED ORAL at 18:15

## 2021-10-03 RX ADMIN — METFORMIN HYDROCHLORIDE 1000 MG: 500 TABLET, FILM COATED ORAL at 08:46

## 2021-10-03 RX ADMIN — INSULIN GLARGINE 17 UNITS: 100 INJECTION, SOLUTION SUBCUTANEOUS at 08:56

## 2021-10-03 RX ADMIN — METOPROLOL TARTRATE 25 MG: 25 TABLET, FILM COATED ORAL at 08:44

## 2021-10-03 RX ADMIN — FAMOTIDINE 20 MG: 20 TABLET ORAL at 22:04

## 2021-10-03 RX ADMIN — INSULIN ASPART 1 UNITS: 100 INJECTION, SOLUTION INTRAVENOUS; SUBCUTANEOUS at 08:55

## 2021-10-03 RX ADMIN — DEXAMETHASONE SODIUM PHOSPHATE 6 MG: 4 INJECTION, SOLUTION INTRAMUSCULAR; INTRAVENOUS at 08:50

## 2021-10-03 RX ADMIN — FAMOTIDINE 20 MG: 20 TABLET ORAL at 08:46

## 2021-10-03 RX ADMIN — INSULIN ASPART 5 UNITS: 100 INJECTION, SOLUTION INTRAVENOUS; SUBCUTANEOUS at 18:16

## 2021-10-03 RX ADMIN — RIVAROXABAN 20 MG: 20 TABLET, FILM COATED ORAL at 18:15

## 2021-10-03 RX ADMIN — LEVOTHYROXINE SODIUM 112 MCG: 0.11 TABLET ORAL at 08:45

## 2021-10-03 RX ADMIN — GLIPIZIDE 20 MG: 5 TABLET, FILM COATED, EXTENDED RELEASE ORAL at 14:31

## 2021-10-03 RX ADMIN — INSULIN ASPART 2 UNITS: 100 INJECTION, SOLUTION INTRAVENOUS; SUBCUTANEOUS at 14:29

## 2021-10-03 RX ADMIN — METOPROLOL TARTRATE 25 MG: 25 TABLET, FILM COATED ORAL at 22:04

## 2021-10-03 ASSESSMENT — ACTIVITIES OF DAILY LIVING (ADL)
ADLS_ACUITY_SCORE: 6
ADLS_ACUITY_SCORE: 6
ADLS_ACUITY_SCORE: 4
ADLS_ACUITY_SCORE: 8
ADLS_ACUITY_SCORE: 6
ADLS_ACUITY_SCORE: 4

## 2021-10-03 ASSESSMENT — MIFFLIN-ST. JEOR: SCORE: 1567.19

## 2021-10-03 NOTE — PROGRESS NOTES
[facility]    Hospitalist Progress Note             Date of Admission:  9/28/2021                   Day of hospitalization: 5    Assessment and Plan:      Mark Shultz is a 81 year old male with PMH including BPH, relatively recent diagnosis of atrial fibrillation/flutter less than 4 weeks ago, sleep apnea, type 2 diabetes, hypertension who presents with cough, malaise, shortness of breath and rapid heart rate.      #Acute hypoxemic respiratory failure secondary to COVID-19 PNA: Patient's wife is currently admitted here with Covid-19 pneumonia as well.  He is vaccinated with Mukul & Mukul vaccine.  He has had multiple visits to the ER over the past month including 1 to 3 days prior to admission where he was felt to be slightly hypervolemic and started on Lasix.  In the ER, patient hypoxemic to the mid 80s with low-grade fever.  CT of the chest consistent with Covid-19 pneumonia.  -Continuous pulse oximetry.  Wean oxygen as able.  -Started on remdesivir (9/28) and dexamethasone therapy (9/28).  We will continue.  -prn anti-tussives  -Xarelto for a/c  -Follow inflammatory markers, oxygen needs, etc.  -Maintain precautions.     #Leukocytosis  -worse today, monitor Most likely related to steroids as his clinical status seems to be improving    #Acute kidney injury: Presents with creatinine of 1.82 with BUN of 41, up from creatinine of 1.03 days ago with BUN of 16 at that time.  Was started on diuretics and is also on multiple potentially nephrotoxic agents including HCTZ and losartan  -Renal function improving.  -Hold home hydrochlorothiazide and losartan  -Hold further IV maintenance fluids given Covid-19 pneumonia.  Bolus as needed for low blood pressures.     #Aflutter: Diagnosed a month ago.  We will pursue rate control strategy and treat infection as above.  TTE obtained showed EF 45-50% which was mildly reduced from prior with global hypokinesia.  Denies any chest pain or  "discomfort.  -Metoprolol 25 mg twice daily  -IV metoprolol 5 mg as needed for rates greater than 120  -Continue Xarelto     #Type 2 diabetes: There is the potential for hyperglycemia in the setting of steroid use.  Lantus changed to 17 units tomorrow in the morning and have sliding scale available.  Titrate as needed.  Started on metformin as well. Restarted his home glipizide      #ABDIEL: Does not use CPAP at home currently but did at 1 point.  Will offer CPAP while here.  This could be part of the reason he is having issues with atrial arrhythmias recently.  Educated  #Hypothyroidism: Resume home Synthroid  #History of hypertension: Due to acute kidney injury will hold HCTZ and losartan.  Continue metoprolol.  # Detectable troponin: 0.039: May be somewhat worse due to acute kidney failure and also demand ischemia in the setting of hypoxic respiratory failure.  Troponin repeat negative.  TTE as above. No chest pain. Continuing aspirin, statin and xarelto.   Liane Hayden MD  Text Page (7am - 6pm, M-F)               Subjective   Chief Complaint: sob  Subjective: No complaints today. States feels good today, still has persistent cough.  No Chest pain shortness of breath fevers chills does have mild cough. Does not want to take aspirin while on xarelto     Objective   BP (!) 143/84   Pulse 68   Temp 97.8  F (36.6  C) (Oral)   Resp 16   Ht 1.727 m (5' 8\")   Wt 88.8 kg (195 lb 11.2 oz)   SpO2 93%   BMI 29.76 kg/m       Physical Exam  General: Pt in NAD, normal appearance  HEENT: OP clear MMM, no JVD  Lungs: bibasilar crackles, normal breathing  without accessory muscle usage, no wheezing, rhonchi or crackles  Cardiac: +S1, S2, RRR, no MRG, no edema  Abdominal: normal bowel sounds, NT/ND, no hepatosplenomegaly  Skin: warm, dry, normal turgor, no rash  Psyche: A& O x3, appropriate affect             Intake/Output Summary (Last 24 hours) at 10/1/2021 1506  Last data filed at 10/1/2021 0957  Gross per 24 hour   Intake " 720 ml   Output --   Net 720 ml           Labs and Imaging Results:      Recent Labs   Lab 10/03/21  0716 10/01/21  0937   WBC 13.5* 12.6*   HGB 14.5 15.3    147*        Recent Labs   Lab 10/03/21  0716 10/01/21  0937    139   CO2 30 30   BUN 24 29      No results for input(s): INR, PTT in the last 168 hours.   No results for input(s): CKMB in the last 168 hours.    Invalid input(s): TROPONINT     Recent Labs   Lab 09/30/21  0808 09/28/21  1355   ALBUMIN 2.8* 3.5   AST 44 58*   ALT 36 36   ALKPHOS 53 51        Micro:     Radio:  Echo Limited   Final Result      CT Chest w/o Contrast   Final Result   IMPRESSION:    1.  New patchy groundglass infiltrates throughout both lungs very consistent with COVID pneumonia.         XR Chest Port 1 View   Final Result   IMPRESSION: Lungs clear. No pneumothorax. Heart size has decreased   slightly since the previous exam. Pulmonary vascularity is within   normal limits. Aortic calcification.      JAMIE BRANCH MD            SYSTEM ID:  [id number]              Medications:      Scheduled Meds:      sodium chloride 0.9%  50 mL Intravenous Q24H     aspirin  81 mg Oral Daily     dexamethasone  6 mg Intravenous Q24H     famotidine  20 mg Oral BID     glipiZIDE  20 mg Oral Daily     insulin aspart  1-7 Units Subcutaneous TID AC     insulin aspart  1-5 Units Subcutaneous At Bedtime     insulin glargine  17 Units Subcutaneous QAM AC     levothyroxine  112 mcg Oral Daily     metFORMIN  1,000 mg Oral BID w/meals     metoprolol tartrate  25 mg Oral BID     rivaroxaban ANTICOAGULANT  20 mg Oral Daily with supper     simvastatin  40 mg Oral At Bedtime     sodium chloride (PF)  3 mL Intracatheter Q8H     Continuous Infusions:      - MEDICATION INSTRUCTIONS -       PRN Meds:  acetaminophen **OR** acetaminophen, benzonatate, glucose **OR** dextrose **OR** glucagon, guaiFENesin-dextromethorphan, lidocaine 4%, lidocaine (buffered or not buffered), melatonin, metoprolol, ondansetron  **OR** ondansetron, - MEDICATION INSTRUCTIONS -, polyethylene glycol, senna-docusate **OR** senna-docusate, sodium chloride (PF)

## 2021-10-03 NOTE — PLAN OF CARE
Pertinent assessments: A&Ox4 but forgetful at times. Up ind. VSS. on 2L NC. O2 sats drop after ambulating to the bathroom but pt recovers quickly. LS dim. MANUEL but improving per pt. Infrequent barky cough, also improving per pt.     Major Shift Events: Uneventful    Treatment Plan: remdesivir, dexamethasone, xarelto    Bedside Nurse: Ngozi Shrestha RN

## 2021-10-03 NOTE — PLAN OF CARE
End of Shift Summary  For vital signs and complete assessments, please see documentation flowsheets.     Pertinent assessments: A&Ox4 but forgetful at times. Up ind. VSS.Oxygen weaned down to  1L NC withsats 91-93%. O2 sats desats to 87-88%% after ambulating to the bathroom but pt recovers quickly. LS dim. MANUEL but improving per pt. Infrequent dry cough, also improving per pt. Good appetite, voids adequately.      Major Shift Events: Uneventful    Treatment Plan:  dexamethasone, xarelto, started glipizide today. Plan to discharge home with insulin on 10/4.    Bedside Nurse: Tejas GARCIA RN

## 2021-10-04 ENCOUNTER — DOCUMENTATION ONLY (OUTPATIENT)
Dept: MEDSURG UNIT | Facility: CLINIC | Age: 81
End: 2021-10-04

## 2021-10-04 VITALS
WEIGHT: 196 LBS | DIASTOLIC BLOOD PRESSURE: 75 MMHG | SYSTOLIC BLOOD PRESSURE: 147 MMHG | RESPIRATION RATE: 18 BRPM | TEMPERATURE: 98 F | BODY MASS INDEX: 29.7 KG/M2 | HEART RATE: 73 BPM | HEIGHT: 68 IN | OXYGEN SATURATION: 97 %

## 2021-10-04 LAB
GLUCOSE BLDC GLUCOMTR-MCNC: 158 MG/DL (ref 70–99)
GLUCOSE BLDC GLUCOMTR-MCNC: 188 MG/DL (ref 70–99)
GLUCOSE BLDC GLUCOMTR-MCNC: 266 MG/DL (ref 70–99)

## 2021-10-04 PROCEDURE — 250N000011 HC RX IP 250 OP 636: Performed by: INTERNAL MEDICINE

## 2021-10-04 PROCEDURE — 250N000012 HC RX MED GY IP 250 OP 636 PS 637: Performed by: HOSPITALIST

## 2021-10-04 PROCEDURE — 99239 HOSP IP/OBS DSCHRG MGMT >30: CPT | Performed by: HOSPITALIST

## 2021-10-04 PROCEDURE — 250N000013 HC RX MED GY IP 250 OP 250 PS 637: Performed by: HOSPITALIST

## 2021-10-04 PROCEDURE — 250N000013 HC RX MED GY IP 250 OP 250 PS 637: Performed by: INTERNAL MEDICINE

## 2021-10-04 RX ORDER — INSULIN ASPART 100 [IU]/ML
INJECTION, SOLUTION INTRAVENOUS; SUBCUTANEOUS
Qty: 15 ML | Refills: 0 | Status: SHIPPED | OUTPATIENT
Start: 2021-10-04 | End: 2024-01-07

## 2021-10-04 RX ORDER — DEXAMETHASONE 6 MG/1
6 TABLET ORAL DAILY
Qty: 3 TABLET | Refills: 0 | Status: ON HOLD | OUTPATIENT
Start: 2021-10-05 | End: 2024-01-09

## 2021-10-04 RX ORDER — METOPROLOL TARTRATE 25 MG/1
12.5 TABLET, FILM COATED ORAL 2 TIMES DAILY
Qty: 30 TABLET | Refills: 0 | Status: ON HOLD | OUTPATIENT
Start: 2021-10-04 | End: 2024-01-03

## 2021-10-04 RX ORDER — LINAGLIPTIN 5 MG/1
5 TABLET, FILM COATED ORAL AT BEDTIME
DISCHARGE
Start: 2021-10-04 | End: 2023-12-28

## 2021-10-04 RX ORDER — INSULIN ASPART 100 [IU]/ML
INJECTION, SOLUTION INTRAVENOUS; SUBCUTANEOUS
Qty: 15 ML | Refills: 0 | Status: SHIPPED | OUTPATIENT
Start: 2021-10-04 | End: 2021-10-04

## 2021-10-04 RX ADMIN — METFORMIN HYDROCHLORIDE 1000 MG: 500 TABLET, FILM COATED ORAL at 08:43

## 2021-10-04 RX ADMIN — METOPROLOL TARTRATE 12.5 MG: 25 TABLET, FILM COATED ORAL at 08:44

## 2021-10-04 RX ADMIN — INSULIN ASPART 3 UNITS: 100 INJECTION, SOLUTION INTRAVENOUS; SUBCUTANEOUS at 14:24

## 2021-10-04 RX ADMIN — INSULIN ASPART 1 UNITS: 100 INJECTION, SOLUTION INTRAVENOUS; SUBCUTANEOUS at 08:38

## 2021-10-04 RX ADMIN — INSULIN GLARGINE 20 UNITS: 100 INJECTION, SOLUTION SUBCUTANEOUS at 09:02

## 2021-10-04 RX ADMIN — DEXAMETHASONE SODIUM PHOSPHATE 6 MG: 4 INJECTION, SOLUTION INTRAMUSCULAR; INTRAVENOUS at 08:48

## 2021-10-04 RX ADMIN — ASPIRIN 81 MG: 81 TABLET, COATED ORAL at 09:03

## 2021-10-04 RX ADMIN — FAMOTIDINE 20 MG: 20 TABLET ORAL at 09:04

## 2021-10-04 RX ADMIN — LEVOTHYROXINE SODIUM 112 MCG: 0.11 TABLET ORAL at 08:44

## 2021-10-04 RX ADMIN — GLIPIZIDE 20 MG: 5 TABLET, FILM COATED, EXTENDED RELEASE ORAL at 08:52

## 2021-10-04 ASSESSMENT — ACTIVITIES OF DAILY LIVING (ADL)
ADLS_ACUITY_SCORE: 8

## 2021-10-04 ASSESSMENT — MIFFLIN-ST. JEOR: SCORE: 1568.55

## 2021-10-04 NOTE — DISCHARGE SUMMARY
Discharge Summary  Hospitalist Service      Mark Shultz MRN# [id number]   YOB: 1940 Age: 81 year old     Date of Admission:  9/28/2021  Date of Discharge:  10/4/2021  Admitting Physician:  Singh Purdy MD  Discharge Physician: Liane Hayden MD   Discharging Service: Hospitalist Service     Primary Provider: Abe Eng  Primary Care Physician Phone Number: [phone]         Discharge Diagnoses/Problem Oriented Hospital Course (Providers):      Discharge Diagnoses   #Acute hypoxemic respiratory failure secondary to COVID-19 PNA:  #Leukocytosis  #Acute kidney injury  #Aflutter  #Type 2 diabetes  #ABDIEL  #Hypothyroidism  # Detectable troponin: 0.039    Hospital Course   Mark Shultz is a 81 year old male with PMH including BPH, relatively recent diagnosis of atrial fibrillation/flutter less than 4 weeks ago, sleep apnea, type 2 diabetes, hypertension who presents with cough, malaise, shortness of breath and rapid heart rate.      #Acute hypoxemic respiratory failure secondary to COVID-19 PNA: Patient's wife was admitted here as well with Covid-19 pneumonia as well.  He is vaccinated with [employer] vaccine.  He has had multiple visits to the ER over the past month including 1 to 3 days prior to admission where he was felt to be slightly hypervolemic and started on Lasix.  In the ER, patient hypoxemic to the mid 80s with low-grade fever.  CT of the chest consistent with Covid-19 pneumonia.  - on room air at rest, requires 2 L with exertion  -finished remdesivir (9/28) and will need to continue dexamethasone therapy 3 more days (9/28).  -prn anti-tussives  -Xarelto for a/c  -Maintain precautions.     #Leukocytosis  -clinically improving, afebrile, monitor most likely steroid induced     #Acute kidney injury: Presents with creatinine of 1.82 with BUN of 41, up from creatinine of 1.03 days ago with BUN of 16 at that time.  Was started on diuretics and is also on multiple  potentially nephrotoxic agents including HCTZ and losartan  -Renal function improving.  -Hold home hydrochlorothiazide and losartan  -Hold further IV maintenance fluids given Covid-19 pneumonia.     #Aflutter: Diagnosed a month ago.  We will pursue rate control strategy and treat infection as above.  TTE obtained showed EF 45-50% which was mildly reduced from prior with global hypokinesia.  Denies any chest pain or discomfort.  Metoprolol reduced to 12.5 mg BID in the setting of asymptomatic pauses in the evening.      #Type 2 diabetes: There is the potential for hyperglycemia in the setting of steroid use.  Lantus changed 20 units for discharge and insulin aspart.  Titrate as needed. Continue home metformin and glipizide. Hold trajenta while on insulin.     #ABDIEL: Does not use CPAP at home currently but did at 1 point.  Will offer CPAP while here.  This could be part of the reason he is having issues with atrial arrhythmias recently.  Educated  #Hypothyroidism: Resume home Synthroid  #History of hypertension: Losartan has been held as his blood pressure have been normal, will defer to add back losartan and monitor blood pressures  # Detectable troponin: 0.039: May be somewhat worse due to acute kidney failure and also demand ischemia in the setting of hypoxic respiratory failure.  Troponin repeat negative.  TTE as above. No chest pain. Continuing aspirin, statin and xarelto.            Code Status:      Full Code         Important Results:                  Pending Results:        Unresulted Labs Ordered in the Past 30 Days of this Admission     No orders found from 8/29/2021 to 9/29/2021.               Discharge Instructions and Follow-Up:      Follow-up Appointments     Discharge - COVID Patient Oximeter Discharge Instructions      COVID Patient Oximeter Discharge Instructions     Use the oximeter to measure the amount of oxygen in your blood.  Put the   clip on the tip of your pointer finger.  Turn on the device  to measure   your oxygen level.  Do this at least 2 times a day. Do it more than 2   times if you feel short of breath.      It should be 90% or higher while you're at rest.  If your oxygen level is   89% or lower, change the position of the clip on your finger or try   another finger.  After 10 minutes if it's still 89% or lower, call 911/go   to the Emergency Department.       If your shortness of breath may be getting worse but the oximeter still   shows 90% or higher, call your doctor or clinic as soon as possible. If   you can't reach your doctor or clinic, or if your shortness of breath gets   very bad, call 911/go to the Emergency Department.    Note: Don't turn down your oxygen until you get instructions at your   virtual visit.         Follow-up and recommended labs and tests       Follow up with primary care provider, Abe Eng, within 7 days   to evaluate medication change and for hospital follow- up.  No follow up   labs or test are needed. Please continue your insulin till Thursday, then   discuss with your pcp if you need to continue insulin while off steroids   as your blood sugars should improve after stopping steroids. Please hold   Tradjenta while on insulin.                  Discharge Disposition:        Discharged to home          Discharge Medications:        Current Discharge Medication List      START taking these medications    Details   dexamethasone (DECADRON) 6 MG tablet Take 1 tablet (6 mg) by mouth daily for 3 days  Qty: 3 tablet, Refills: 0    Associated Diagnoses: COVID-19      insulin aspart (NOVOLOG FLEXPEN) 100 UNIT/ML pen Novolog Flexpen 3 units with breakfast 3 units with lunch 4 units with dinner till Thursday  Qty: 15 mL, Refills: 0    Associated Diagnoses: Type 2 diabetes mellitus with other specified complication, without long-term current use of insulin (H)      insulin glargine (LANTUS PEN) 100 UNIT/ML pen Inject 20 Units Subcutaneous At Bedtime for 5 days  Qty: 1  mL, Refills: 0    Comments: If Lantus is not covered by insurance, may substitute Basaglar at same dose and frequency.    Associated Diagnoses: Type 2 diabetes mellitus with other specified complication, without long-term current use of insulin (H)      insulin pen needle (31G X 8 MM) 31G X 8 MM miscellaneous 1 Box of 100 insulin pen needles to be dispensed with every insulin pen prescription  Qty: 100 each, Refills: 0    Associated Diagnoses: Type 2 diabetes mellitus with other specified complication, without long-term current use of insulin (H)         CONTINUE these medications which have CHANGED    Details   linagliptin (TRADJENTA) 5 MG TABS tablet Take 1 tablet (5 mg) by mouth At Bedtime    Comments: Hold while on insulin  Associated Diagnoses: Type 2 diabetes mellitus with other specified complication, without long-term current use of insulin (H)      metoprolol tartrate (LOPRESSOR) 25 MG tablet Take 0.5 tablets (12.5 mg) by mouth 2 times daily  Qty: 30 tablet, Refills: 0    Associated Diagnoses: Chronic atrial fibrillation (H)         CONTINUE these medications which have NOT CHANGED    Details   Cholecalciferol (VITAMIN D) 2000 UNITS CAPS Take 2,000 Units by mouth daily       glipiZIDE (GLUCOTROL XL) 10 MG 24 hr tablet Take 20 mg by mouth daily       Levothyroxine Sodium 100 MCG CAPS Take 112 mcg by mouth daily       metFORMIN (GLUCOPHAGE) 1000 MG tablet Take 1,000 mg by mouth 2 times daily (with meals)      rivaroxaban ANTICOAGULANT (XARELTO ANTICOAGULANT) 20 MG TABS tablet Take 1 tablet (20 mg) by mouth daily (with dinner)  Qty: 30 tablet, Refills: 0      simvastatin (ZOCOR) 40 MG tablet Take 40 mg by mouth At Bedtime         STOP taking these medications       losartan (COZAAR) 100 MG tablet Comments:   Reason for Stopping:                    Allergies:         Allergies   Allergen Reactions     Codeine Nausea and Vomiting     Shellfish Allergy      Tylenol W/Codeine [Acetaminophen-Codeine]              "Consultations This Hospital Stay:        No consultations were requested during this admission          Condition and Physical Exam on Discharge:        Discharge condition: Stable   Discharge vitals: Blood pressure 132/67, pulse 65, temperature 97.7  F (36.5  C), temperature source Oral, resp. rate 16, height 1.727 m (5' 8\"), weight 88.9 kg (196 lb), SpO2 94 %.   General: Pt in NAD, normal appearance  HEENT: OP clear MMM, no JVD  Lungs: Clear to Auscultation Bilateral, normal breathing  without accessory muscle usage, no wheezing, rhonchi or crackles  Cardiac: +S1, S2, RRR, no MRG, no edema  Abdominal: normal bowel sounds, NT/ND, no hepatosplenomegaly  Skin: warm, dry, normal turgor, no rash  Psyche: A& O x3, appropriate affect            Discharge Orders for Skilled Facility (from Discharge Orders):        After Care Instructions     Activity      Your activity upon discharge: activity as tolerated         Diet      Follow this diet upon discharge: Orders Placed This Encounter      Combination Diet Regular Diet Adult         Discharge - Quarantine/Isolation Instruction      Date of symptom onset:     Date of first positive test:    Stay home and away from others (self-isolate) for at least 20 days from when your symptoms started And...   You've had no fevers and no medicine that reduces fever for 1 full day (24 hours) And...   Your other symptoms have resolved (gotten better).         Monitor and record      blood glucose 4 times a day, before meals and at bedtime                    Rehab orders for Skilled Facility (from Discharge Orders):      Referrals     Future Labs/Procedures    COVID-19 GetWell Loop Referral     Comments:    We know it can be scary to hear that you might have COVID-19. So our team   can help track your symptoms and make sure you are doing ok over the next   two weeks, we use a program called [employer] to keep in touch. When you   receive an email from [employer], please consider " enrolling in our   monitoring program. There is no cost to you for monitoring.     Here is a URL where you can learn more:  [email or web address]               Discharge Time:      Greater than 30 minutes.        Image Results From This Hospital Stay (For Non-EPIC Providers):        Results for orders placed or performed during the hospital encounter of 09/28/21   XR Chest Port 1 View    Narrative    CHEST ONE VIEW PORTABLE September 28, 2021 3:01 PM     HISTORY: COVID-19. Shortness of breath.    COMPARISON: 9/25/2021.      Impression    IMPRESSION: Lungs clear. No pneumothorax. Heart size has decreased  slightly since the previous exam. Pulmonary vascularity is within  normal limits. Aortic calcification.    JAMIE BRANCH MD         SYSTEM ID:  [id number]   CT Chest w/o Contrast    Narrative    EXAM: CT CHEST W/O CONTRAST  LOCATION: [facility]  DATE/TIME: 9/28/2021 9:10 PM    INDICATION: Pneumonia, effusion or abscess suspected, xray done  COMPARISON: Chest x-ray from today, CT 8/22/2021  TECHNIQUE: CT chest without IV contrast. Multiplanar reformats were obtained. Dose reduction techniques were used.  CONTRAST: None.    FINDINGS:   LUNGS AND PLEURA: There are now patchy groundglass focal infiltrates predominantly in the peripheral distribution very consistent with COVID related pneumonia. There is no dense consolidation or pleural effusion. Resolution of the mild interstitial edema   seen previously.    MEDIASTINUM/AXILLAE: No lymphadenopathy. No thoracic aortic aneurysm.    CORONARY ARTERY CALCIFICATION: None.    UPPER ABDOMEN: No significant finding.    MUSCULOSKELETAL: Degenerative changes throughout thoracic spine.      Impression    IMPRESSION:   1.  New patchy groundglass infiltrates throughout both lungs very consistent with COVID pneumonia.     Echo Limited     Value    LVEF  45-50%    Narrative    [id number]  [id number]  [id number]  [id number]^CAIN^HEATH^CHAI      [facility]  Echocardiography Laboratory  [address]  [place] [address]     Name: STAR BRADLEY  MRN: [id number]  : 1940  Study Date: 2021 08:58 AM  Age: 81 yrs  Gender: Male  Patient Location: [facility]  Reason For Study: Aflutter  Ordering Physician: HEATH BARLOW  Referring Physician: Abe Eng  Performed By: Shannen Ayers RDCS     BSA: 2.0 m2  Height: 68 in  Weight: 199 lb  HR: 85  BP: 132/96 mmHg  ______________________________________________________________________________  Procedure  Limited Portable Echo Adult. Optison (NDC #0227-7349) given intravenously.  ______________________________________________________________________________  Interpretation Summary     Left ventricular systolic function is mildly reduced.  The visual ejection fraction is 45-50%.  There is mild global hypokinesia of the left ventricle.  EF mildly decreased compared to 2015. The study was technically difficult.  ______________________________________________________________________________  Left Ventricle  Left ventricular systolic function is mildly reduced. The visual ejection  fraction is 45-50%. There is mild global hypokinesia of the left ventricle.     Right Ventricle  The right ventricle is grossly normal size. The right ventricular systolic  function is normal.     Mitral Valve  There is trace mitral regurgitation.     Tricuspid Valve  No tricuspid regurgitation. Right ventricular systolic pressure could not be  approximated due to inadequate tricuspid regurgitation. IVC diameter <2.1 cm  collapsing >50% with sniff suggests a normal RA pressure of 3 mmHg.     Pericardium  There is no pericardial effusion.     Rhythm  The rhythm was undetermined.  ______________________________________________________________________________  Report approved by: Elvis Rizo 2021 10:14 AM      ______________________________________________________________________________              Most Recent Lab Results In EPIC (For Non-EPIC Providers):    Most Recent 3 CBC's:  Recent Labs   Lab Test 10/03/21  0716 10/01/21  0937 09/30/21  0808   WBC 13.5* 12.6* 11.1*   HGB 14.5 15.3 14.2   MCV 96 98 97    147* 130*      Most Recent 3 BMP's:  Recent Labs   Lab Test 10/04/21  0836 10/04/21  0234 10/03/21  2203 10/03/21  0831 10/03/21  0716 10/01/21  1346 10/01/21  0937 09/30/21  0912 09/30/21  0808   NA  --   --   --   --  138  --  139  --  138   POTASSIUM  --   --   --   --  3.5  --  3.7  --  3.6   CHLORIDE  --   --   --   --  102  --  104  --  105   CO2  --   --   --   --  30  --  30  --  27   BUN  --   --   --   --  24  --  29  --  30   CR  --   --   --   --  0.85  --  0.92  --  0.97   ANIONGAP  --   --   --   --  6  --  5  --  6   BREANNE  --   --   --   --  8.5  --  8.6  --  8.3*   * 188* 329*   < > 206*   < > 204*   < > 166*    < > = values in this interval not displayed.     Most Recent 3 Troponin's:No lab results found.  Most Recent 3 INR's:No lab results found.  Most Recent 2 LFT's:  Recent Labs   Lab Test 09/30/21  0808 09/28/21  1355   AST 44 58*   ALT 36 36   ALKPHOS 53 51   BILITOTAL 0.5 0.7     Most Recent Cholesterol Panel:  Recent Labs   Lab Test 05/18/15  0000   CHOL 118   LDL 51.2   HDL 43   TRIG 119     Most Recent 6 Bacteria Isolates From Any Culture (See EPIC Reports for Culture Details):No lab results found.  Most Recent TSH, T4 and HgbA1c:  Recent Labs   Lab Test 10/01/21  0937 08/22/21  1814 05/18/15  0000   TSH  --  3.56 2.611   T4  --   --  0.94   A1C 6.8*  --  6.6*

## 2021-10-04 NOTE — PLAN OF CARE
End of Shift Summary  For vital signs and complete assessments, please see documentation flowsheets.     Pertinent assessments: Lung sounds clear, diminished bilaterally. Nonproductive intermittent cough noted. Dyspnea on exertion. Pt. On 2L nasal cannula. Pt. Independent in the room. A&O X4.   Major Shift Events: none   Treatment Plan: Continue with diabetic management and education, oxygen therapy. Plan for patient to discharge sometime today.

## 2021-10-04 NOTE — DISCHARGE INSTRUCTIONS
Friday, October 8th at 10:30AM. Video Visit. With Dr Collins as Dr Eng is full.    Abe Eng [phone] [facility] [address] [facility], [facility] [address]*      Call clinic if you don't know how to do video visit.       [address] [employer] [phone].  Call them if you have questions or concerns.

## 2021-10-04 NOTE — PROVIDER NOTIFICATION
Tele tech called and said that the patient has had about 5, 2 second pauses on his tele in the last half hour.  He is asymptomatic and his BP and HR are stable just slightly bradycardic.  MD notified.      MD said just continue to monitor, no changes to be made at this time.

## 2021-10-04 NOTE — PROGRESS NOTES
I scheduled Pt for f/u:      Friday, October 8th at 10:30AM. Video Visit. With Dr Collins as Dr Eng is full.    Velasquez Abe Ortega [phone] [facility] [address], [facility] [address]*      Call clinic if you don't know how to do video visit.     ADDENDUM 1442 I called [facility] to set up new 02.  They will call back with ETA.  RN will need to do education on how to manage 02.    I updated Pt.  He will need a Taxi: [phone] set up.  Let SW know when 02 here so we can get cab for him.    ADDENDUM 5864: I called [employer] .  They will come to main entrance of hospital.  They will call us within 45 minutes to 1 hour . Phone: [phone].  [place] did pay for this ride.  They are aware covid +.  Pt aware.     Bridgette Phelan RN BSN   Inpatient Care Coordination  [facility]  [phone]

## 2021-10-04 NOTE — PLAN OF CARE
End of Shift Summary  For vital signs and complete assessments, please see documentation flowsheets.     Pertinent assessments: A&Ox4 but forgetful at times. Up ind.VS WNL.O2 @1L this shift, tried to wean to RA, pt sats 88%, enc to use IS & pulm toileting. Denies MANUEL or at rest, poor appetite. States he is ready to go home.   Major Shift Events: Uneventful    Treatment Plan:  dexamethasone, xarelto, started glipizide today. Cont to monitor bG & O2 levels.

## 2021-10-04 NOTE — PROGRESS NOTES
Oxygen Documentation:   I certify that this patient, Mark Shultz has been under my care (or a nurse practitioner or physican's assistant working with me). This is the face-to-face encounter for oxygen medical necessity.      Mark Shultz is now in a chronic stable state and continues to require supplemental oxygen. Patient has continued oxygen desaturation due to covid.    Alternative treatment(s) tried or considered and deemed clinically infective for treatment of covid include inhalers, steroids, and pulmonary toileting.  If portability is ordered, is the patient mobile within the home? yes    **Patients who qualify for home O2 coverage under the CMS guidelines require ABG tests or O2 sat readings obtained closest to, but no earlier than 2 days prior to the discharge, as evidence of the need for home oxygen therapy. Testing must be performed while patient is in the chronic stable state. See notes for O2 sats.**

## 2021-10-04 NOTE — PHARMACY - DISCHARGE MEDICATION RECONCILIATION AND EDUCATION
.  Pharmacy assistance with diabetes education for new insulin therapy.  Pt was initiated on insulin therapy during hospitalization due to steroid induced hyperglycemia from dexamethasone. Pt will be on basal and short acting insulin until completion of steroid therapy.    Current A1C 6.8. Goal A1C <7  Pt will demonstrate ability to correctly calculate, prepare & self-administer insulin  Pt will check BG three times daily before meals and will share with primary care physician  Reminded pt that illness/sickness could worsen blood glucose.   Pt knows how to recognize and treat signs of hypoglycemia: reviewed with wife as patient wanted his wife to help him with management. FYI: pt's wife is also on insulin.    IMPORTANT FOLLOW UP NOTES:   -Pt will follow up with PCP for enhancement of DM regimen if needed.       Discharge Medication List     Review of your medicines      START taking      Dose / Directions   dexamethasone 6 MG tablet  Commonly known as: DECADRON      Dose: 6 mg  Start taking on: October 5, 2021  Take 1 tablet (6 mg) by mouth daily for 3 days  Quantity: 3 tablet  Refills: 0     insulin glargine 100 UNIT/ML pen  Commonly known as: LANTUS PEN  Used for: Type 2 diabetes mellitus with other specified complication, without long-term current use of insulin (H)      Dose: 20 Units  Inject 20 Units Subcutaneous At Bedtime for 5 days  Quantity: 1 mL  Refills: 0     insulin pen needle 31G X 8 MM miscellaneous  Commonly known as: 31G X 8 MM  Used for: Type 2 diabetes mellitus with other specified complication, without long-term current use of insulin (H)      1 Box of 100 insulin pen needles to be dispensed with every insulin pen prescription  Quantity: 100 each  Refills: 0     NovoLOG FLEXPEN 100 UNIT/ML pen  Used for: Type 2 diabetes mellitus with other specified complication, without long-term current use of insulin (H)  Generic drug: insulin aspart      Novolog Flexpen 3 units with breakfast 3 units with  lunch 4 units with dinner till Thursday  Quantity: 15 mL  Refills: 0        CONTINUE these medicines which may have CHANGED, or have new prescriptions. If we are uncertain of the size of tablets/capsules you have at home, strength may be listed as something that might have changed.      Dose / Directions   metoprolol tartrate 25 MG tablet  Commonly known as: LOPRESSOR  This may have changed: how much to take      Dose: 12.5 mg  Take 0.5 tablets (12.5 mg) by mouth 2 times daily  Quantity: 30 tablet  Refills: 0        CONTINUE these medicines which have NOT CHANGED      Dose / Directions   glipiZIDE 10 MG 24 hr tablet  Commonly known as: GLUCOTROL XL      Dose: 20 mg  Take 20 mg by mouth daily  Refills: 0     levothyroxine 100 MCG capsule  Commonly known as: TIROSINT      Dose: 112 mcg  Take 112 mcg by mouth daily  Refills: 0     losartan 100 MG tablet  Commonly known as: COZAAR      Dose: 100 mg  Take 100 mg by mouth every morning  Refills: 0     metFORMIN 1000 MG tablet  Commonly known as: GLUCOPHAGE      Dose: 1,000 mg  Take 1,000 mg by mouth 2 times daily (with meals)  Refills: 0     rivaroxaban ANTICOAGULANT 20 MG Tabs tablet  Commonly known as: XARELTO ANTICOAGULANT      Dose: 20 mg  Take 1 tablet (20 mg) by mouth daily (with dinner)  Quantity: 30 tablet  Refills: 0     simvastatin 40 MG tablet  Commonly known as: ZOCOR      Dose: 40 mg  Take 40 mg by mouth At Bedtime  Refills: 0     Tradjenta 5 MG Tabs tablet  Used for: Type 2 diabetes mellitus with other specified complication, without long-term current use of insulin (H)  Generic drug: linagliptin      Dose: 5 mg  Take 1 tablet (5 mg) by mouth At Bedtime  Refills: 0     vitamin D 50 MCG (2000 UT) Caps      Dose: 2,000 Units  Take 2,000 Units by mouth daily  Refills: 0           Where to get your medicines      These medications were sent to [place], [place] - [id number] [facility] [address]     Phone: [phone]     dexamethasone 6 MG tablet    insulin glargine 100 UNIT/ML pen    insulin pen needle 31G X 8 MM miscellaneous    metoprolol tartrate 25 MG tablet    NovoLOG FLEXPEN 100 UNIT/ML pen

## 2021-10-04 NOTE — PLAN OF CARE
End of Shift Summary  For vital signs and complete assessments, please see documentation flowsheets.     Pertinent assessments: A&O but forgetful at times.  VSS. Denies nausea, and pain.  On 1L O2 this shift.  Denies MANUEL or at rest.  Up independently. Tele running Afib. Blood sugar 188.    Major Shift Events: tele tech called and said patient had about 5, 2 second pauses on his tele in the last 30 minutes.  Patient asymptomatic and BP and HR stable but slightly bradycardic. MD was notified, will continue to monitor, no changes made.     Treatment Plan:  dexamethasone, xarelto, glipizide. Monitor BG and respiratory status.      Bedside Nurse: Liv Reyes RN

## 2021-10-04 NOTE — PLAN OF CARE
Pt discharged home via cab. Sent w/ home oxygen- educated on home oxygen use, indicated understanding. Pt had insulin pen x 2 and decadron filled at [facility] pharmacy. Educated pt on insulen pen usage, pt indicated understanding. AVS discussed, questions encouraged and answered.

## 2021-10-04 NOTE — PROGRESS NOTES
Patient has been assessed for Home Oxygen needs. Oxygen readings:    *Pulse oximetry (SpO2) = 94% on room air at rest while awake.    *SpO2 improved to 94% on 0liters/minute at rest.    *SpO2 = 88% on room air during activity/with exercise.    *SpO2 improved to 92% on 2liters/minute during activity/with exercise.

## 2021-10-04 NOTE — PROGRESS NOTES
Cross Cover:     Notified of asymptomatic cardiac pauses (5x roughly 2 sec pauses) while patient was asleep. Asymptomatic. Bradycardic. If persistent symptomatic bradycardia and/or frequent prolonged pauses can consider holding metoprolol. Patient completed course of Remdesivir which may have contributed.

## 2021-10-04 NOTE — PROGRESS NOTES
Received oxygen intake, reviewed chart; all documents present including a valid rx.   I spoke to the nurse on the floor to let her know we would deliver within 2 hours.

## 2021-10-05 ENCOUNTER — PATIENT OUTREACH (OUTPATIENT)
Dept: CARE COORDINATION | Facility: CLINIC | Age: 81
End: 2021-10-05

## 2021-10-05 DIAGNOSIS — Z71.89 OTHER SPECIFIED COUNSELING: ICD-10-CM

## 2021-10-05 NOTE — PROGRESS NOTES
Clinic Care Coordination Contact  [facility]/Voicemail       Clinical Data: Care Coordinator Outreach  Outreach attempted x 1.  Voicemail full.  Plan: Care Coordinator will try to reach patient again in 1-2 business days.    Catie Resendiz  Community Health Worker  [facility] Care Resource [facility]  [phone]

## 2021-10-06 NOTE — PROGRESS NOTES
Clinic Care Coordination Contact  [facility]/Voicemail       Clinical Data: Care Coordinator Outreach  Outreach attempted x 2.  Voicemail full.  Plan: Care Coordinator will do no further outreaches at this time.    Catie Resendiz  Community Health Worker  [facility] Care Resource [place], [facility]  [phone]

## 2023-10-27 ENCOUNTER — OFFICE VISIT (OUTPATIENT)
Dept: UROLOGY | Facility: CLINIC | Age: 83
End: 2023-10-27
Payer: MEDICARE

## 2023-10-27 VITALS
DIASTOLIC BLOOD PRESSURE: 65 MMHG | HEART RATE: 65 BPM | WEIGHT: 199 LBS | HEIGHT: 67 IN | BODY MASS INDEX: 31.23 KG/M2 | SYSTOLIC BLOOD PRESSURE: 143 MMHG

## 2023-10-27 DIAGNOSIS — Z79.2 PROPHYLACTIC ANTIBIOTIC: ICD-10-CM

## 2023-10-27 DIAGNOSIS — Q64.32 CONGENITAL STRICTURE OF URETHRA: ICD-10-CM

## 2023-10-27 DIAGNOSIS — R33.9 URINARY RETENTION: ICD-10-CM

## 2023-10-27 DIAGNOSIS — C67.2 MALIGNANT NEOPLASM OF LATERAL WALL OF URINARY BLADDER (H): ICD-10-CM

## 2023-10-27 DIAGNOSIS — N40.0 ENLARGED PROSTATE: Primary | ICD-10-CM

## 2023-10-27 LAB
ALBUMIN UR-MCNC: NEGATIVE MG/DL
APPEARANCE UR: CLEAR
BILIRUB UR QL STRIP: NEGATIVE
COLOR UR AUTO: YELLOW
GLUCOSE UR STRIP-MCNC: NEGATIVE MG/DL
HGB UR QL STRIP: ABNORMAL
KETONES UR STRIP-MCNC: NEGATIVE MG/DL
LEUKOCYTE ESTERASE UR QL STRIP: NEGATIVE
NITRATE UR QL: NEGATIVE
PH UR STRIP: 5.5 [PH] (ref 5–7)
RESIDUAL VOLUME (RV) (EXTERNAL): 636
SP GR UR STRIP: 1.02 (ref 1–1.03)
UROBILINOGEN UR STRIP-ACNC: 0.2 E.U./DL

## 2023-10-27 PROCEDURE — 51798 US URINE CAPACITY MEASURE: CPT | Performed by: UROLOGY

## 2023-10-27 PROCEDURE — 99204 OFFICE O/P NEW MOD 45 MIN: CPT | Mod: 25 | Performed by: UROLOGY

## 2023-10-27 PROCEDURE — 81003 URINALYSIS AUTO W/O SCOPE: CPT | Mod: QW | Performed by: UROLOGY

## 2023-10-27 RX ORDER — CIPROFLOXACIN 500 MG/1
500 TABLET, FILM COATED ORAL ONCE
Qty: 1 TABLET | Refills: 0 | Status: SHIPPED | OUTPATIENT
Start: 2023-10-27 | End: 2023-10-27

## 2023-10-27 RX ORDER — CEFAZOLIN SODIUM 2 G/50ML
2 SOLUTION INTRAVENOUS
Status: CANCELLED | OUTPATIENT
Start: 2023-10-27

## 2023-10-27 RX ORDER — GABAPENTIN 100 MG/1
100 CAPSULE ORAL 2 TIMES DAILY
COMMUNITY
Start: 2023-05-01

## 2023-10-27 RX ORDER — CEFAZOLIN SODIUM 2 G/50ML
2 SOLUTION INTRAVENOUS SEE ADMIN INSTRUCTIONS
Status: CANCELLED | OUTPATIENT
Start: 2023-10-27

## 2023-10-27 RX ORDER — MAGNESIUM OXIDE TAB 400 MG (241.3 MG ELEMENTAL MG) 400 (241.3 MG) MG
400 TAB ORAL DAILY
COMMUNITY
Start: 2023-01-08 | End: 2023-12-28

## 2023-10-27 RX ORDER — TAMSULOSIN HYDROCHLORIDE 0.4 MG/1
0.4 CAPSULE ORAL 2 TIMES DAILY
COMMUNITY
Start: 2022-12-20

## 2023-10-27 RX ORDER — MECLIZINE HYDROCHLORIDE 25 MG/1
25 TABLET ORAL 3 TIMES DAILY PRN
COMMUNITY
Start: 2022-04-12

## 2023-10-27 RX ORDER — LIDOCAINE HYDROCHLORIDE 20 MG/ML
JELLY TOPICAL ONCE
Status: COMPLETED | OUTPATIENT
Start: 2023-10-27 | End: 2023-10-27

## 2023-10-27 RX ADMIN — LIDOCAINE HYDROCHLORIDE 5 ML: 20 JELLY TOPICAL at 09:31

## 2023-10-27 ASSESSMENT — PAIN SCALES - GENERAL: PAINLEVEL: NO PAIN (0)

## 2023-10-27 NOTE — PATIENT INSTRUCTIONS
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
## 2023-10-27 NOTE — NURSING NOTE
Chief Complaint   Patient presents with    Consult     Pt here for 2nd opinion       Pt states he had hematuria about a year ago, had workup at [place], looking for a second opinion. Pt did not do bladder biopsy    PVR: 636 mL    Tracey Camargo, CMA

## 2023-10-27 NOTE — NURSING NOTE
Chief Complaint   Patient presents with    Consult     Pt here for 2nd opinion , in office cystoscopy        Prior to the start of the procedure and with procedural staff participation, I verbally confirmed the patient s identity using two indicators, relevant allergies, that the procedure was appropriate and matched the consent or emergent situation, and that the correct equipment/implants were available. Immediately prior to starting the procedure I conducted the Time Out with the procedural staff and re-confirmed the patient s name, procedure, and site/side. I have wiped the patient off with the povidone-Iodine solution, draped them,  used Lidocaine hydrochloride jelly, and instilled sterile water into the bladder. (The Joint Commission universal protocol was followed.)  Yes    Sedation (Moderate or Deep): None     5mL 2% lidocaine hydrochloride Urojet instilled into urethra.    NDC# [address]-5  Lot #: [id number]  Expiration Date:  02/25    Tracey Camargo CMA
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
## 2023-12-28 RX ORDER — LOSARTAN POTASSIUM 50 MG/1
50 TABLET ORAL DAILY
COMMUNITY
Start: 2023-07-11

## 2023-12-28 RX ORDER — LEVOTHYROXINE SODIUM 112 UG/1
112 TABLET ORAL DAILY
COMMUNITY
Start: 2023-09-30

## 2024-01-03 ENCOUNTER — HOSPITAL ENCOUNTER (OUTPATIENT)
Facility: CLINIC | Age: 84
Discharge: HOME | End: 2024-01-03
Attending: UROLOGY | Admitting: UROLOGY
Payer: MEDICARE

## 2024-01-03 ENCOUNTER — ANESTHESIA EVENT (OUTPATIENT)
Dept: SURGERY | Facility: CLINIC | Age: 84
End: 2024-01-03
Payer: MEDICARE

## 2024-01-03 ENCOUNTER — ANESTHESIA (OUTPATIENT)
Dept: SURGERY | Facility: CLINIC | Age: 84
End: 2024-01-03
Payer: MEDICARE

## 2024-01-03 VITALS
SYSTOLIC BLOOD PRESSURE: 150 MMHG | BODY MASS INDEX: 32.22 KG/M2 | DIASTOLIC BLOOD PRESSURE: 69 MMHG | OXYGEN SATURATION: 92 % | HEIGHT: 67 IN | WEIGHT: 205.3 LBS | HEART RATE: 89 BPM | RESPIRATION RATE: 10 BRPM | TEMPERATURE: 98.4 F

## 2024-01-03 LAB
GLUCOSE BLDC GLUCOMTR-MCNC: 146 MG/DL (ref 70–99)
GLUCOSE BLDC GLUCOMTR-MCNC: 170 MG/DL (ref 70–99)

## 2024-01-03 PROCEDURE — 250N000011 HC RX IP 250 OP 636: Performed by: NURSE ANESTHETIST, CERTIFIED REGISTERED

## 2024-01-03 PROCEDURE — 82962 GLUCOSE BLOOD TEST: CPT

## 2024-01-03 PROCEDURE — 710N000012 HC RECOVERY PHASE 2, PER MINUTE: Performed by: UROLOGY

## 2024-01-03 PROCEDURE — 250N000009 HC RX 250: Performed by: NURSE ANESTHETIST, CERTIFIED REGISTERED

## 2024-01-03 PROCEDURE — 250N000025 HC SEVOFLURANE, PER MIN: Performed by: UROLOGY

## 2024-01-03 PROCEDURE — 88307 TISSUE EXAM BY PATHOLOGIST: CPT | Mod: TC | Performed by: UROLOGY

## 2024-01-03 PROCEDURE — 258N000003 HC RX IP 258 OP 636: Performed by: ANESTHESIOLOGY

## 2024-01-03 PROCEDURE — 360N000076 HC SURGERY LEVEL 3, PER MIN: Performed by: UROLOGY

## 2024-01-03 PROCEDURE — 52240 CYSTOSCOPY AND TREATMENT: CPT | Performed by: UROLOGY

## 2024-01-03 PROCEDURE — 370N000017 HC ANESTHESIA TECHNICAL FEE, PER MIN: Performed by: UROLOGY

## 2024-01-03 PROCEDURE — 272N000001 HC OR GENERAL SUPPLY STERILE: Performed by: UROLOGY

## 2024-01-03 PROCEDURE — 258N000001 HC RX 258: Performed by: UROLOGY

## 2024-01-03 PROCEDURE — 999N000141 HC STATISTIC PRE-PROCEDURE NURSING ASSESSMENT: Performed by: UROLOGY

## 2024-01-03 PROCEDURE — 710N000009 HC RECOVERY PHASE 1, LEVEL 1, PER MIN: Performed by: UROLOGY

## 2024-01-03 PROCEDURE — 250N000009 HC RX 250: Performed by: UROLOGY

## 2024-01-03 PROCEDURE — 250N000011 HC RX IP 250 OP 636: Performed by: UROLOGY

## 2024-01-03 RX ORDER — ONDANSETRON 4 MG/1
4 TABLET, ORALLY DISINTEGRATING ORAL EVERY 30 MIN PRN
Status: DISCONTINUED | OUTPATIENT
Start: 2024-01-03 | End: 2024-01-03 | Stop reason: HOSPADM

## 2024-01-03 RX ORDER — HYDROMORPHONE HCL IN WATER/PF 6 MG/30 ML
0.2 PATIENT CONTROLLED ANALGESIA SYRINGE INTRAVENOUS EVERY 5 MIN PRN
Status: DISCONTINUED | OUTPATIENT
Start: 2024-01-03 | End: 2024-01-03 | Stop reason: HOSPADM

## 2024-01-03 RX ORDER — SODIUM CHLORIDE, SODIUM LACTATE, POTASSIUM CHLORIDE, CALCIUM CHLORIDE 600; 310; 30; 20 MG/100ML; MG/100ML; MG/100ML; MG/100ML
INJECTION, SOLUTION INTRAVENOUS CONTINUOUS
Status: DISCONTINUED | OUTPATIENT
Start: 2024-01-03 | End: 2024-01-03 | Stop reason: HOSPADM

## 2024-01-03 RX ORDER — DEXAMETHASONE SODIUM PHOSPHATE 4 MG/ML
INJECTION, SOLUTION INTRA-ARTICULAR; INTRALESIONAL; INTRAMUSCULAR; INTRAVENOUS; SOFT TISSUE PRN
Status: DISCONTINUED | OUTPATIENT
Start: 2024-01-03 | End: 2024-01-03

## 2024-01-03 RX ORDER — FENTANYL CITRATE 50 UG/ML
INJECTION, SOLUTION INTRAMUSCULAR; INTRAVENOUS PRN
Status: DISCONTINUED | OUTPATIENT
Start: 2024-01-03 | End: 2024-01-03

## 2024-01-03 RX ORDER — DIAZEPAM 10 MG/2ML
2.5 INJECTION, SOLUTION INTRAMUSCULAR; INTRAVENOUS
Status: DISCONTINUED | OUTPATIENT
Start: 2024-01-03 | End: 2024-01-03 | Stop reason: HOSPADM

## 2024-01-03 RX ORDER — HYDROMORPHONE HCL IN WATER/PF 6 MG/30 ML
0.4 PATIENT CONTROLLED ANALGESIA SYRINGE INTRAVENOUS EVERY 5 MIN PRN
Status: DISCONTINUED | OUTPATIENT
Start: 2024-01-03 | End: 2024-01-03 | Stop reason: HOSPADM

## 2024-01-03 RX ORDER — LABETALOL HYDROCHLORIDE 5 MG/ML
10 INJECTION, SOLUTION INTRAVENOUS EVERY 10 MIN PRN
Status: DISCONTINUED | OUTPATIENT
Start: 2024-01-03 | End: 2024-01-03 | Stop reason: HOSPADM

## 2024-01-03 RX ORDER — ALBUTEROL SULFATE 0.83 MG/ML
2.5 SOLUTION RESPIRATORY (INHALATION) EVERY 4 HOURS PRN
Status: DISCONTINUED | OUTPATIENT
Start: 2024-01-03 | End: 2024-01-03 | Stop reason: HOSPADM

## 2024-01-03 RX ORDER — FUROSEMIDE 20 MG
20 TABLET ORAL 2 TIMES DAILY
COMMUNITY

## 2024-01-03 RX ORDER — METOPROLOL TARTRATE 50 MG
50 TABLET ORAL 2 TIMES DAILY
Status: ON HOLD | COMMUNITY
End: 2024-08-14

## 2024-01-03 RX ORDER — FENTANYL CITRATE 50 UG/ML
25 INJECTION, SOLUTION INTRAMUSCULAR; INTRAVENOUS EVERY 5 MIN PRN
Status: DISCONTINUED | OUTPATIENT
Start: 2024-01-03 | End: 2024-01-03 | Stop reason: HOSPADM

## 2024-01-03 RX ORDER — OXYCODONE HYDROCHLORIDE 5 MG/1
5 TABLET ORAL EVERY 4 HOURS PRN
Status: DISCONTINUED | OUTPATIENT
Start: 2024-01-03 | End: 2024-01-03 | Stop reason: HOSPADM

## 2024-01-03 RX ORDER — LIDOCAINE 40 MG/G
CREAM TOPICAL
Status: DISCONTINUED | OUTPATIENT
Start: 2024-01-03 | End: 2024-01-03 | Stop reason: HOSPADM

## 2024-01-03 RX ORDER — CEFAZOLIN SODIUM/WATER 2 G/20 ML
2 SYRINGE (ML) INTRAVENOUS
Status: COMPLETED | OUTPATIENT
Start: 2024-01-03 | End: 2024-01-03

## 2024-01-03 RX ORDER — ONDANSETRON 2 MG/ML
4 INJECTION INTRAMUSCULAR; INTRAVENOUS EVERY 30 MIN PRN
Status: DISCONTINUED | OUTPATIENT
Start: 2024-01-03 | End: 2024-01-03 | Stop reason: HOSPADM

## 2024-01-03 RX ORDER — CEFAZOLIN SODIUM/WATER 2 G/20 ML
2 SYRINGE (ML) INTRAVENOUS SEE ADMIN INSTRUCTIONS
Status: DISCONTINUED | OUTPATIENT
Start: 2024-01-03 | End: 2024-01-03 | Stop reason: HOSPADM

## 2024-01-03 RX ORDER — ONDANSETRON 2 MG/ML
INJECTION INTRAMUSCULAR; INTRAVENOUS PRN
Status: DISCONTINUED | OUTPATIENT
Start: 2024-01-03 | End: 2024-01-03

## 2024-01-03 RX ORDER — FENTANYL CITRATE 50 UG/ML
25 INJECTION, SOLUTION INTRAMUSCULAR; INTRAVENOUS
Status: DISCONTINUED | OUTPATIENT
Start: 2024-01-03 | End: 2024-01-03 | Stop reason: HOSPADM

## 2024-01-03 RX ORDER — FUROSEMIDE 10 MG/ML
INJECTION INTRAMUSCULAR; INTRAVENOUS PRN
Status: DISCONTINUED | OUTPATIENT
Start: 2024-01-03 | End: 2024-01-03

## 2024-01-03 RX ORDER — EPHEDRINE SULFATE 50 MG/ML
INJECTION, SOLUTION INTRAMUSCULAR; INTRAVENOUS; SUBCUTANEOUS PRN
Status: DISCONTINUED | OUTPATIENT
Start: 2024-01-03 | End: 2024-01-03

## 2024-01-03 RX ORDER — DEXAMETHASONE SODIUM PHOSPHATE 4 MG/ML
4 INJECTION, SOLUTION INTRA-ARTICULAR; INTRALESIONAL; INTRAMUSCULAR; INTRAVENOUS; SOFT TISSUE
Status: DISCONTINUED | OUTPATIENT
Start: 2024-01-03 | End: 2024-01-03 | Stop reason: HOSPADM

## 2024-01-03 RX ORDER — FENTANYL CITRATE 50 UG/ML
50 INJECTION, SOLUTION INTRAMUSCULAR; INTRAVENOUS EVERY 5 MIN PRN
Status: DISCONTINUED | OUTPATIENT
Start: 2024-01-03 | End: 2024-01-03 | Stop reason: HOSPADM

## 2024-01-03 RX ORDER — MEPERIDINE HYDROCHLORIDE 25 MG/ML
12.5 INJECTION INTRAMUSCULAR; INTRAVENOUS; SUBCUTANEOUS EVERY 5 MIN PRN
Status: DISCONTINUED | OUTPATIENT
Start: 2024-01-03 | End: 2024-01-03 | Stop reason: HOSPADM

## 2024-01-03 RX ORDER — PROPOFOL 10 MG/ML
INJECTION, EMULSION INTRAVENOUS PRN
Status: DISCONTINUED | OUTPATIENT
Start: 2024-01-03 | End: 2024-01-03

## 2024-01-03 RX ORDER — LIDOCAINE HYDROCHLORIDE 20 MG/ML
INJECTION, SOLUTION INFILTRATION; PERINEURAL PRN
Status: DISCONTINUED | OUTPATIENT
Start: 2024-01-03 | End: 2024-01-03

## 2024-01-03 RX ORDER — OXYCODONE HYDROCHLORIDE 5 MG/1
10 TABLET ORAL EVERY 4 HOURS PRN
Status: DISCONTINUED | OUTPATIENT
Start: 2024-01-03 | End: 2024-01-03 | Stop reason: HOSPADM

## 2024-01-03 RX ADMIN — PROPOFOL 120 MG: 10 INJECTION, EMULSION INTRAVENOUS at 13:16

## 2024-01-03 RX ADMIN — PROPOFOL 30 MG: 10 INJECTION, EMULSION INTRAVENOUS at 13:17

## 2024-01-03 RX ADMIN — Medication 5 MG: at 13:47

## 2024-01-03 RX ADMIN — SODIUM CHLORIDE, POTASSIUM CHLORIDE, SODIUM LACTATE AND CALCIUM CHLORIDE: 600; 310; 30; 20 INJECTION, SOLUTION INTRAVENOUS at 12:24

## 2024-01-03 RX ADMIN — ONDANSETRON 4 MG: 2 INJECTION INTRAMUSCULAR; INTRAVENOUS at 13:42

## 2024-01-03 RX ADMIN — LIDOCAINE HYDROCHLORIDE 50 MG: 20 INJECTION, SOLUTION INFILTRATION; PERINEURAL at 13:16

## 2024-01-03 RX ADMIN — FENTANYL CITRATE 50 MCG: 50 INJECTION INTRAMUSCULAR; INTRAVENOUS at 13:43

## 2024-01-03 RX ADMIN — ROCURONIUM BROMIDE 30 MG: 50 INJECTION, SOLUTION INTRAVENOUS at 13:17

## 2024-01-03 RX ADMIN — SUGAMMADEX 200 MG: 100 INJECTION, SOLUTION INTRAVENOUS at 13:42

## 2024-01-03 RX ADMIN — DEXAMETHASONE SODIUM PHOSPHATE 4 MG: 4 INJECTION, SOLUTION INTRA-ARTICULAR; INTRALESIONAL; INTRAMUSCULAR; INTRAVENOUS; SOFT TISSUE at 13:42

## 2024-01-03 RX ADMIN — Medication 2 G: at 13:11

## 2024-01-03 RX ADMIN — FENTANYL CITRATE 50 MCG: 50 INJECTION INTRAMUSCULAR; INTRAVENOUS at 13:16

## 2024-01-03 RX ADMIN — SODIUM CHLORIDE, POTASSIUM CHLORIDE, SODIUM LACTATE AND CALCIUM CHLORIDE: 600; 310; 30; 20 INJECTION, SOLUTION INTRAVENOUS at 13:44

## 2024-01-03 RX ADMIN — Medication 10 MG: at 13:44

## 2024-01-03 RX ADMIN — FUROSEMIDE 10 MG: 10 INJECTION, SOLUTION INTRAVENOUS at 13:42

## 2024-01-03 RX ADMIN — Medication 10 MG: at 13:27

## 2024-01-03 ASSESSMENT — ACTIVITIES OF DAILY LIVING (ADL)
ADLS_ACUITY_SCORE: 29
ADLS_ACUITY_SCORE: 31

## 2024-01-03 NOTE — PROGRESS NOTES
There were medications placed on the AVS by a hospital provider for this patient that didn't pertain to his situation.  Provider was notified and will modify the AVS once she is available to do so.  RN instructed patient to disregard the orders for novolog flex pen, lantus and dexamethasone.  He verbalized understanding.  He was not sent home with any medications.

## 2024-01-03 NOTE — ANESTHESIA CARE TRANSFER NOTE
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    Anesthesia Type:   General     Note:    Oropharynx: oral airway in place and spontaneously breathing  Level of Consciousness: drowsy  Oxygen Supplementation: face mask  Level of Supplemental Oxygen (L/min / FiO2): 10  Independent Airway: airway patency satisfactory and stable  Dentition: dentition unchanged  Vital Signs Stable: post-procedure vital signs reviewed and stable  Report to RN Given: handoff report given  Patient transferred to: PACU    Handoff Report: Identifed the Patient, Identified the Reponsible Provider, Reviewed the pertinent medical history, Discussed the surgical course, Reviewed Intra-OP anesthesia mangement and issues during anesthesia, Set expectations for post-procedure period and Allowed opportunity for questions and acknowledgement of understanding      Vitals:  Vitals Value Taken Time   /59 01/03/24 1354   Temp     Pulse 56 01/03/24 1356   Resp 11 01/03/24 1356   SpO2 97 % 01/03/24 1356   Vitals shown include unfiled device data.    Electronically Signed By: JONNY Boyce CRNA  January 3, 2024  1:57 PM

## 2024-01-03 NOTE — PROGRESS NOTES
I evaluated the patient in the postoperative unit and he has bloody urine.  I hand irrigated the catheter and was able to get the output clear fairly quickly.  However, after waiting 30 minutes the urine became bloody again, no clots at this time.    I recommended to the patient that he stay in the hospital on observation overnight tonight so that the nurses can watch his catheter and irrigate the catheter if needed.  This is due to the degree of hematuria.    He declines admission overnight.  He understands that it is my recommendation for him to stay overnight in the hospital tonight.     In that case, I showed him how to irrigate the catheter with a catheter tip syringe if needed.  He was sent home with a catheter tip syringe.  I instructed him to irrigate the catheter only if the catheter clogs or stops draining completely.  I otherwise instructed him to stay very well-hydrated over the next 24 hours.

## 2024-01-03 NOTE — ANESTHESIA PROCEDURE NOTES
Airway         Procedure Start/Stop Times: 1/3/2024 1:18 PM  Staff -        CRNA: Singh Avila APRN CRNA       Performed By: CRNAIndications and Patient Condition       Indications for airway management: anne-procedural and airway protection       Induction type:intravenous       Mask difficulty assessment: 1 - vent by mask    Final Airway Details       Final airway type: supraglottic airway    Supraglottic Airway Details        Type: LMA       Brand: I-Gel       LMA size: 5    Post intubation assessment        Placement verified by: capnometry, equal breath sounds and chest rise        Number of attempts at approach: 1       Secured with: commercial tube interiano       Ease of procedure: easy       Dentition: Intact    Medication(s) Administered   Medication Administration Time: 1/3/2024 1:18 PM

## 2024-01-03 NOTE — ANESTHESIA POSTPROCEDURE EVALUATION
Patient: Mark Shultz    Procedure: Procedure(s):  Cystoscopy with transurethral resection of bladder tumor       Anesthesia Type:  General    Note:     Postop Pain Control: Uneventful            Sign Out: Well controlled pain   PONV: No   Neuro/Psych: Uneventful            Sign Out: Acceptable/Baseline neuro status   Airway/Respiratory: Uneventful            Sign Out: Acceptable/Baseline resp. status   CV/Hemodynamics: Uneventful            Sign Out: Acceptable CV status   Other NRE: NONE   DID A NON-ROUTINE EVENT OCCUR? No           Last vitals:  Vitals Value Taken Time   /77 01/03/24 1520   Temp 97.2  F (36.2  C) 01/03/24 1354   Pulse 58 01/03/24 1533   Resp 9 01/03/24 1533   SpO2 93 % 01/03/24 1533   Vitals shown include unfiled device data.    Electronically Signed By: Bala Esqueda MD  January 3, 2024  3:34 PM

## 2024-01-03 NOTE — PROGRESS NOTES
Dr. Daniels performed agarwal catheter bladder irrigation at bedside.    Bibi Hooper RN on 1/3/2024 at 3:33 PM

## 2024-01-03 NOTE — OP NOTE
SURGEON:  Jeremiah Daniels MD     PREOPERATIVE DIAGNOSIS: Bladder tumor     POSTOPERATIVE DIAGNOSIS: Bladder tumor     PROCEDURE PERFORMED: Cystoscopy, transurethral resection of the bladder tumor, large, greater than 5 cm     ANESTHESIA:  General.     COMPLICATIONS:  None     INDICATIONS FOR PROCEDURE: This is an 83-year-old gentleman with a bladder tumor and presents for transurethral resection of bladder tumor    DETAILS OF PROCEDURE: The risks and benefits of the procedure were explained in detail the patient and informed consent was obtained.  The patient was brought to the operating room and placed supine on the operating table where he underwent general endotracheal anesthetic.  He was then moved down to the dorsal lithotomy position where he was prepped and draped in the standard sterile fashion.    The procedure began by using the visual obturator to introduce the 26 Swazi resectoscope sheath into the bladder.  I performed a complete cystoscopy.  The patient had a mild bulbar urethral stricture present that was passively dilated by the scope.  The prostate showed substantial lateral lobe enlargement as well as the median lobe.  I Identified each ureteral orifice.  The patient had a distended bladder, he was retaining a great deal of urine.  The bladder was quite trabeculated.  There were 3 separate areas of papillary bladder tumor present on the right side of the bladder.  I used the cautery loop to resect out each tumor in its entirety.  I used the cautery function to cauterize the base and achieve hemostasis.  I flushed out the specimen from the bladder and sent it for permanent pathology.  Once I was certain I removed all specimen of a remove the scope and drained the bladder with an 18 Swazi coudé tip Pemberton catheter.  The procedure was concluded at this point.      The patient tolerated the procedure well without complications.  He went to the postanesthetic care unit in good condition.  He will  return to clinic in 2 weeks for Pemberton catheter removal and to learn self-catheterization.  I will call him with the pathology results when they are available.
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
## 2024-01-03 NOTE — PROVIDER NOTIFICATION
"Dr. Daniels at bedside again to evaluate pt.  Pt was adamant he go home as he has a handicap wife who needs his presence. Pt stated he has nobody else to assist at home an d for that reason he must go home.  He stated he \"cannot stay\" overnight no matter what.  Pt understood risk of anesthesia impacts.  Dr. Daniels aware of situation and okayed pt to be discharged home.  "

## 2024-01-03 NOTE — DISCHARGE INSTRUCTIONS
GENERAL ANESTHESIA OR SEDATION ADULT DISCHARGE INSTRUCTIONS   SPECIAL PRECAUTIONS FOR 24 HOURS AFTER SURGERY    IT IS NOT UNUSUAL TO FEEL LIGHT-HEADED OR FAINT, UP TO 24 HOURS AFTER SURGERY OR WHILE TAKING PAIN MEDICATION.  IF YOU HAVE THESE SYMPTOMS; SIT FOR A FEW MINUTES BEFORE STANDING AND HAVE SOMEONE ASSIST YOU WHEN YOU GET UP TO WALK OR USE THE BATHROOM.    YOU SHOULD REST AND RELAX FOR THE NEXT 24 HOURS AND YOU MUST MAKE ARRANGEMENTS TO HAVE SOMEONE STAY WITH YOU FOR AT LEAST 24 HOURS AFTER YOUR DISCHARGE.  AVOID HAZARDOUS AND STRENUOUS ACTIVITIES.  DO NOT MAKE IMPORTANT DECISIONS FOR 24 HOURS.    DO NOT DRIVE ANY VEHICLE OR OPERATE MECHANICAL EQUIPMENT FOR 24 HOURS FOLLOWING THE END OF YOUR SURGERY.  EVEN THOUGH YOU MAY FEEL NORMAL, YOUR REACTIONS MAY BE AFFECTED BY THE MEDICATION YOU HAVE RECEIVED.    DO NOT DRINK ALCOHOLIC BEVERAGES FOR 24 HOURS FOLLOWING YOUR SURGERY.    DRINK CLEAR LIQUIDS (APPLE JUICE, GINGER ALE, 7-UP, BROTH, ETC.).  PROGRESS TO YOUR REGULAR DIET AS YOU FEEL ABLE.    YOU MAY HAVE A DRY MOUTH, A SORE THROAT, MUSCLES ACHES OR TROUBLE SLEEPING.  THESE SHOULD GO AWAY AFTER 24 HOURS.    CALL YOUR DOCTOR FOR ANY OF THE FOLLOWING:  SIGNS OF INFECTION (FEVER, GROWING TENDERNESS AT THE SURGERY SITE, A LARGE AMOUNT OF DRAINAGE OR BLEEDING, SEVERE PAIN, FOUL-SMELLING DRAINAGE, REDNESS OR SWELLING.    IT HAS BEEN OVER 8 TO 10 HOURS SINCE SURGERY AND YOU ARE STILL NOT ABLE TO URINATE (PASS WATER).      Maximum acetaminophen (Tylenol) dose from all sources should not exceed 4 grams (4000 mg) per day.       Per Dr. Daniels  Pt is to irrigate agarwal catheter with tap water as needed if urine stops coming out of catheter and/or discomfort to bladder increases without urine coming out.  He stated there should be urine coming out of the catheter at least every 30 minutes.

## 2024-01-04 LAB
PATH REPORT.COMMENTS IMP SPEC: ABNORMAL
PATH REPORT.COMMENTS IMP SPEC: YES
PATH REPORT.FINAL DX SPEC: ABNORMAL
PATH REPORT.GROSS SPEC: ABNORMAL
PATH REPORT.MICROSCOPIC SPEC OTHER STN: ABNORMAL
PATH REPORT.RELEVANT HX SPEC: ABNORMAL
PHOTO IMAGE: ABNORMAL

## 2024-01-04 PROCEDURE — 88307 TISSUE EXAM BY PATHOLOGIST: CPT | Mod: 26 | Performed by: PATHOLOGY

## 2024-01-05 ENCOUNTER — HOSPITAL ENCOUNTER (EMERGENCY)
Facility: CLINIC | Age: 84
Discharge: HOME | DRG: 920 | End: 2024-01-06
Attending: EMERGENCY MEDICINE | Admitting: EMERGENCY MEDICINE
Payer: MEDICARE

## 2024-01-05 DIAGNOSIS — T83.9XXA FOLEY CATHETER PROBLEM, INITIAL ENCOUNTER (H): ICD-10-CM

## 2024-01-05 DIAGNOSIS — D49.4 BLADDER TUMOR: ICD-10-CM

## 2024-01-05 DIAGNOSIS — R31.0 GROSS HEMATURIA: ICD-10-CM

## 2024-01-05 LAB
ACANTHOCYTES BLD QL SMEAR: ABNORMAL
ALBUMIN UR-MCNC: 70 MG/DL
ANION GAP SERPL CALCULATED.3IONS-SCNC: 12 MMOL/L (ref 7–15)
APPEARANCE UR: ABNORMAL
AUER BODIES BLD QL SMEAR: ABNORMAL
BACTERIA #/AREA URNS HPF: ABNORMAL /HPF
BASO STIPL BLD QL SMEAR: ABNORMAL
BASOPHILS # BLD AUTO: 0.1 10E3/UL (ref 0–0.2)
BASOPHILS NFR BLD AUTO: 0 %
BILIRUB UR QL STRIP: NEGATIVE
BITE CELLS BLD QL SMEAR: ABNORMAL
BLISTER CELLS BLD QL SMEAR: ABNORMAL
BUN SERPL-MCNC: 24.9 MG/DL (ref 8–23)
BURR CELLS BLD QL SMEAR: ABNORMAL
CALCIUM SERPL-MCNC: 9 MG/DL (ref 8.8–10.2)
CHLORIDE SERPL-SCNC: 101 MMOL/L (ref 98–107)
COLOR UR AUTO: ABNORMAL
CREAT SERPL-MCNC: 1.26 MG/DL (ref 0.67–1.17)
DACRYOCYTES BLD QL SMEAR: ABNORMAL
DEPRECATED HCO3 PLAS-SCNC: 27 MMOL/L (ref 22–29)
EGFRCR SERPLBLD CKD-EPI 2021: 57 ML/MIN/1.73M2
ELLIPTOCYTES BLD QL SMEAR: ABNORMAL
EOSINOPHIL # BLD AUTO: 0.2 10E3/UL (ref 0–0.7)
EOSINOPHIL NFR BLD AUTO: 1 %
ERYTHROCYTE [DISTWIDTH] IN BLOOD BY AUTOMATED COUNT: 13.2 % (ref 10–15)
FRAGMENTS BLD QL SMEAR: ABNORMAL
GLUCOSE SERPL-MCNC: 182 MG/DL (ref 70–99)
GLUCOSE UR STRIP-MCNC: NEGATIVE MG/DL
HCT VFR BLD AUTO: 34.3 % (ref 40–53)
HGB BLD-MCNC: 11.8 G/DL (ref 13.3–17.7)
HGB C CRYSTALS: ABNORMAL
HGB UR QL STRIP: ABNORMAL
HOLD SPECIMEN: NORMAL
HOLD SPECIMEN: NORMAL
HOWELL-JOLLY BOD BLD QL SMEAR: ABNORMAL
IMM GRANULOCYTES # BLD: 0.1 10E3/UL
IMM GRANULOCYTES NFR BLD: 0 %
KETONES UR STRIP-MCNC: NEGATIVE MG/DL
LEUKOCYTE ESTERASE UR QL STRIP: ABNORMAL
LYMPHOCYTES # BLD AUTO: 7 10E3/UL (ref 0.8–5.3)
LYMPHOCYTES NFR BLD AUTO: 42 %
MCH RBC QN AUTO: 33.8 PG (ref 26.5–33)
MCHC RBC AUTO-ENTMCNC: 34.4 G/DL (ref 31.5–36.5)
MCV RBC AUTO: 98 FL (ref 78–100)
MONOCYTES # BLD AUTO: 1.1 10E3/UL (ref 0–1.3)
MONOCYTES NFR BLD AUTO: 7 %
MUCOUS THREADS #/AREA URNS LPF: PRESENT /LPF
NEUTROPHILS # BLD AUTO: 8.3 10E3/UL (ref 1.6–8.3)
NEUTROPHILS NFR BLD AUTO: 50 %
NEUTS HYPERSEG BLD QL SMEAR: ABNORMAL
NITRATE UR QL: NEGATIVE
NRBC # BLD AUTO: 0 10E3/UL
NRBC BLD AUTO-RTO: 0 /100
PH UR STRIP: 6 [PH] (ref 5–7)
PLAT MORPH BLD: ABNORMAL
PLATELET # BLD AUTO: 169 10E3/UL (ref 150–450)
POLYCHROMASIA BLD QL SMEAR: ABNORMAL
POTASSIUM SERPL-SCNC: 4.3 MMOL/L (ref 3.4–5.3)
RBC # BLD AUTO: 3.49 10E6/UL (ref 4.4–5.9)
RBC AGGLUT BLD QL: ABNORMAL
RBC MORPH BLD: ABNORMAL
RBC URINE: >182 /HPF
ROULEAUX BLD QL SMEAR: ABNORMAL
SICKLE CELLS BLD QL SMEAR: ABNORMAL
SMUDGE CELLS BLD QL SMEAR: PRESENT
SODIUM SERPL-SCNC: 140 MMOL/L (ref 135–145)
SP GR UR STRIP: 1.01 (ref 1–1.03)
SPHEROCYTES BLD QL SMEAR: ABNORMAL
STOMATOCYTES BLD QL SMEAR: ABNORMAL
TARGETS BLD QL SMEAR: ABNORMAL
TOXIC GRANULES BLD QL SMEAR: ABNORMAL
UROBILINOGEN UR STRIP-MCNC: NORMAL MG/DL
VARIANT LYMPHS BLD QL SMEAR: ABNORMAL
WBC # BLD AUTO: 16.7 10E3/UL (ref 4–11)
WBC URINE: 22 /HPF

## 2024-01-05 PROCEDURE — 99283 EMERGENCY DEPT VISIT LOW MDM: CPT | Mod: 25

## 2024-01-05 PROCEDURE — 85014 HEMATOCRIT: CPT | Performed by: EMERGENCY MEDICINE

## 2024-01-05 PROCEDURE — 85025 COMPLETE CBC W/AUTO DIFF WBC: CPT | Performed by: EMERGENCY MEDICINE

## 2024-01-05 PROCEDURE — 80048 BASIC METABOLIC PNL TOTAL CA: CPT | Performed by: EMERGENCY MEDICINE

## 2024-01-05 PROCEDURE — 36415 COLL VENOUS BLD VENIPUNCTURE: CPT | Performed by: EMERGENCY MEDICINE

## 2024-01-05 PROCEDURE — 51702 INSERT TEMP BLADDER CATH: CPT

## 2024-01-05 PROCEDURE — 81001 URINALYSIS AUTO W/SCOPE: CPT | Performed by: EMERGENCY MEDICINE

## 2024-01-05 PROCEDURE — 87086 URINE CULTURE/COLONY COUNT: CPT | Performed by: EMERGENCY MEDICINE

## 2024-01-05 ASSESSMENT — ACTIVITIES OF DAILY LIVING (ADL)
ADLS_ACUITY_SCORE: 33

## 2024-01-05 NOTE — ED TRIAGE NOTES
"Pt presents for evaluation of hematuria with indwelling agarwal catheter issues. Had a tumor remvoed by Dr. Daniels on 1/3/24. Pt had clots on the first day, but nothing significant since. Today, pt put leg beg on and pt noted no drainage. Pt put his overnight bag back on. Pt has been \"back flushing\" catheter and has noted drainage since. Went to the clinic and was sent for further evaluation. Pt states he his the primary caregiver of his wife and cannot be hospitalized. Pt has pain when he feels the urge to void. Denies bladder fullness or pressure. This morning, pt emptied 1150 mL of urine. Urine has been red since surgery. Was on xarelto, but hasn't restarted since surgery. Supposed to start on Saturday.         "

## 2024-01-05 NOTE — ED PROVIDER NOTES
"    History     Chief Complaint:  Hematuria and Catheter Problem       HPI   Mark Shultz is a 83 year old male with history of hypertension, Afib, and type II diabetes who presents with hematuria and a catheter problem that began after the patient tried to change his leg bag earlier today. The patient recently was in the hospital after he had his tumor from bladder removed. The patient has been recording the discharge of his catheter which has been 3300 ccs. The patient began to feel a \"burning\" pain and discomfort in his groin after he adjusted the bag and was clogged with clots. The catheter bag would also not drain after this. The patient went to the Urology clinic who could not get his catheter to drain and sent him here. The patient has not been on his blood thinner since the procedure. He otherwise feels fine and denies fever, chills, bruising or bleeding elsewhere, or other symptoms.    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     Independent Historian:    None     Review of External Notes:  I reviewed the admission and observation note from 1/3/24.    Jeremiah specifically recommended admission for level of post op hematuria.   Medications:    Cipro  Xarelto  Vitamin D  Decadron  Lasix  Neurontin  Glucotrol  Lantus  Synthroid  Antivert  Glucophage  Lopressor  Zocor  Flomax  Januvia     Past Medical History:    Atrial flutter  Benign tumor  Type II diabetes mellitus  Hypercholesterolemia   Afib  TITUS  Hypertension  ABDIEL  Palpitations  Peripheral neuropathy    Past Surgical History:    Cystoscopy x2  Cystoscopy, transurethral resection tumor bladder, combined  Appendectomy   Hernia repair    Fracture surgery, leg    Physical Exam   Patient Vitals for the past 24 hrs:   BP Temp Temp src Pulse Resp SpO2 Height Weight   01/05/24 1436 108/69 97.6  F (36.4  C) Oral 68 18 95 % 1.702 m (5' 7\") 93 kg (205 lb)      Physical Exam  General: " Patient is well appearing. No distress.  Head: Atraumatic.  Eyes: Conjunctivae and EOM are normal. No scleral icterus.  Neck: Normal range of motion. Neck supple.   Cardiovascular: Normal rate, regular rhythm, normal heart sounds and intact distal pulses.   Pulmonary/Chest: Breath sounds normal. No respiratory distress.  Abdominal: Soft. Bowel sounds are normal. No distension. No tenderness. No rebound or guarding.   Musculoskeletal: Normal range of motion.  Skin: Warm and dry. No rash noted. Not diaphoretic.    : Catheter in place. Dark bloody urine throughout tubing and bag.    Emergency Department Course   ECG      Imaging:  No orders to display         Laboratory:  Labs Ordered and Resulted from Time of ED Arrival to Time of ED Departure   ROUTINE UA WITH MICROSCOPIC REFLEX TO CULTURE - Abnormal       Result Value    Color Urine Orange (*)     Appearance Urine Slightly Cloudy (*)     Glucose Urine Negative      Bilirubin Urine Negative      Ketones Urine Negative      Specific Gravity Urine 1.010      Blood Urine Large (*)     pH Urine 6.0      Protein Albumin Urine 70 (*)     Urobilinogen Urine Normal      Nitrite Urine Negative      Leukocyte Esterase Urine Moderate (*)     Bacteria Urine Few (*)     Mucus Urine Present (*)     RBC Urine >182 (*)     WBC Urine 22 (*)    BASIC METABOLIC PANEL - Abnormal    Sodium 140      Potassium 4.3      Chloride 101      Carbon Dioxide (CO2) 27      Anion Gap 12      Urea Nitrogen 24.9 (*)     Creatinine 1.26 (*)     GFR Estimate 57 (*)     Calcium 9.0      Glucose 182 (*)    CBC WITH PLATELETS AND DIFFERENTIAL - Abnormal    WBC Count 16.7 (*)     RBC Count 3.49 (*)     Hemoglobin 11.8 (*)     Hematocrit 34.3 (*)     MCV 98      MCH 33.8 (*)     MCHC 34.4      RDW 13.2      Platelet Count 169      % Neutrophils 50      % Lymphocytes 42      % Monocytes 7      % Eosinophils 1      % Basophils 0      % Immature Granulocytes 0      NRBCs per 100 WBC 0      Absolute  Neutrophils 8.3      Absolute Lymphocytes 7.0 (*)     Absolute Monocytes 1.1      Absolute Eosinophils 0.2      Absolute Basophils 0.1      Absolute Immature Granulocytes 0.1      Absolute NRBCs 0.0     RBC AND PLATELET MORPHOLOGY - Abnormal    Platelet Assessment        Value: Automated Count Confirmed. Platelet morphology is normal.    Acanthocytes        Kevin Rods        Basophilic Stippling        Bite Cells        Blister Cells        Mystic Cells        Elliptocytes        Hgb C Crystals        Ruiz-Jolly Bodies        Hypersegmented Neutrophils        Polychromasia        RBC agglutination        RBC Fragments        Reactive Lymphocytes        Rouleaux        Sickle Cells        Smudge Cells Present (*)     Spherocytes        Stomatocytes        Target Cells        Teardrop Cells        Toxic Neutrophils        RBC Morphology Confirmed RBC Indices     HEMOGLOBIN AND HEMATOCRIT - Abnormal    Hemoglobin 11.1 (*)     Hematocrit 32.0 (*)    URINE CULTURE      Procedures       Emergency Department Course & Assessments:       Interventions:  Medications - No data to display     Assessments:  1733 I obtained history and examined the patient as noted above.     Independent Interpretation (X-rays, CTs, rhythm strip):      Consultations/Discussion of Management or Tests:         Social Determinants of Health affecting care:       Disposition:  The patient was discharged to home.     Impression & Plan      Medical Decision Making:  Pt had gross hematuria post procedure and was recommended to stay and continues to have this with some clogging of catheter.  He has dropped his hgb since surgery and continues to have gross hematuria.  Here he was copiously irrigated and changed catheter and is having pink to red clear urine.  He refuses any admission to hospital whatsoever.  I highly recommended this considering bennetts conversation and the continued gross hematuria and blood count changes.  He is not at level of  requiring transfusion and repeat here slight drop but essentially unchanged.  I bluntly stated I believe this signifies life threatening bleeding and admission required pt refuses and understands and will likely return with further clotting of catheter or worsening bleeding and needs to see urology in clinic.  Unfortunately care issues with wife and no available plan to help him complicates his ability to make the decision.      Diagnosis:    ICD-10-CM    1. Gross hematuria  R31.0       2. Bladder tumor  D49.4       3. Pemberton catheter problem, initial encounter (H24)  T83.9XXA            Discharge Medications:  New Prescriptions    No medications on file      Scribe Disclosure:  I, He Moon, am serving as a scribe at 5:32 PM on 1/5/2024 to document services personally performed by Naif Bennett MD based on my observations and the provider's statements to me.                Naif Bennett MD  01/06/24 0012

## 2024-01-05 NOTE — ED NOTES
Pt verbally hostile with writer related to time in ED. Attempted education and to meet patient needs. Pt chooses to continue to make hostile comments at writer. Conversation terminated by writer.

## 2024-01-06 VITALS
SYSTOLIC BLOOD PRESSURE: 115 MMHG | HEART RATE: 70 BPM | OXYGEN SATURATION: 98 % | RESPIRATION RATE: 20 BRPM | WEIGHT: 205 LBS | HEIGHT: 67 IN | TEMPERATURE: 98 F | BODY MASS INDEX: 32.18 KG/M2 | DIASTOLIC BLOOD PRESSURE: 78 MMHG

## 2024-01-06 LAB
HCT VFR BLD AUTO: 32 % (ref 40–53)
HGB BLD-MCNC: 11.1 G/DL (ref 13.3–17.7)

## 2024-01-07 ENCOUNTER — HOSPITAL ENCOUNTER (INPATIENT)
Facility: CLINIC | Age: 84
LOS: 2 days | Discharge: HOME | DRG: 920 | End: 2024-01-09
Attending: EMERGENCY MEDICINE | Admitting: INTERNAL MEDICINE
Payer: MEDICARE

## 2024-01-07 DIAGNOSIS — N39.0 URINARY TRACT INFECTION ASSOCIATED WITH INDWELLING URETHRAL CATHETER, INITIAL ENCOUNTER (H): ICD-10-CM

## 2024-01-07 DIAGNOSIS — I48.20 CHRONIC ATRIAL FIBRILLATION (H): Primary | ICD-10-CM

## 2024-01-07 DIAGNOSIS — T83.511A URINARY TRACT INFECTION ASSOCIATED WITH INDWELLING URETHRAL CATHETER, INITIAL ENCOUNTER (H): ICD-10-CM

## 2024-01-07 DIAGNOSIS — R31.0 GROSS HEMATURIA: ICD-10-CM

## 2024-01-07 DIAGNOSIS — R33.9 URINARY RETENTION: ICD-10-CM

## 2024-01-07 LAB
ACANTHOCYTES BLD QL SMEAR: NORMAL
ALBUMIN UR-MCNC: 100 MG/DL
ANION GAP SERPL CALCULATED.3IONS-SCNC: 10 MMOL/L (ref 7–15)
APPEARANCE UR: ABNORMAL
AUER BODIES BLD QL SMEAR: NORMAL
BACTERIA #/AREA URNS HPF: ABNORMAL /HPF
BACTERIA UR CULT: NO GROWTH
BASO STIPL BLD QL SMEAR: NORMAL
BASOPHILS # BLD AUTO: 0 10E3/UL (ref 0–0.2)
BASOPHILS NFR BLD AUTO: 0 %
BILIRUB UR QL STRIP: NEGATIVE
BITE CELLS BLD QL SMEAR: NORMAL
BLISTER CELLS BLD QL SMEAR: NORMAL
BUN SERPL-MCNC: 19.7 MG/DL (ref 8–23)
BURR CELLS BLD QL SMEAR: NORMAL
CALCIUM SERPL-MCNC: 8.7 MG/DL (ref 8.8–10.2)
CHLORIDE SERPL-SCNC: 103 MMOL/L (ref 98–107)
COLOR UR AUTO: ABNORMAL
CREAT SERPL-MCNC: 1.11 MG/DL (ref 0.67–1.17)
DACRYOCYTES BLD QL SMEAR: NORMAL
DEPRECATED HCO3 PLAS-SCNC: 25 MMOL/L (ref 22–29)
EGFRCR SERPLBLD CKD-EPI 2021: 66 ML/MIN/1.73M2
ELLIPTOCYTES BLD QL SMEAR: NORMAL
EOSINOPHIL # BLD AUTO: 0.1 10E3/UL (ref 0–0.7)
EOSINOPHIL NFR BLD AUTO: 1 %
ERYTHROCYTE [DISTWIDTH] IN BLOOD BY AUTOMATED COUNT: 13 % (ref 10–15)
FRAGMENTS BLD QL SMEAR: NORMAL
GLUCOSE BLDC GLUCOMTR-MCNC: 240 MG/DL (ref 70–99)
GLUCOSE SERPL-MCNC: 244 MG/DL (ref 70–99)
GLUCOSE UR STRIP-MCNC: 200 MG/DL
HCT VFR BLD AUTO: 32.8 % (ref 40–53)
HGB BLD-MCNC: 11.2 G/DL (ref 13.3–17.7)
HGB C CRYSTALS: NORMAL
HGB UR QL STRIP: ABNORMAL
HOWELL-JOLLY BOD BLD QL SMEAR: NORMAL
IMM GRANULOCYTES # BLD: 0.1 10E3/UL
IMM GRANULOCYTES NFR BLD: 0 %
KETONES UR STRIP-MCNC: NEGATIVE MG/DL
LACTATE SERPL-SCNC: 1.2 MMOL/L (ref 0.7–2)
LEUKOCYTE ESTERASE UR QL STRIP: ABNORMAL
LYMPHOCYTES # BLD AUTO: 6.7 10E3/UL (ref 0.8–5.3)
LYMPHOCYTES NFR BLD AUTO: 39 %
MCH RBC QN AUTO: 34 PG (ref 26.5–33)
MCHC RBC AUTO-ENTMCNC: 34.1 G/DL (ref 31.5–36.5)
MCV RBC AUTO: 100 FL (ref 78–100)
MONOCYTES # BLD AUTO: 1 10E3/UL (ref 0–1.3)
MONOCYTES NFR BLD AUTO: 6 %
MUCOUS THREADS #/AREA URNS LPF: PRESENT /LPF
NEUTROPHILS # BLD AUTO: 9.1 10E3/UL (ref 1.6–8.3)
NEUTROPHILS NFR BLD AUTO: 54 %
NEUTS HYPERSEG BLD QL SMEAR: NORMAL
NITRATE UR QL: NEGATIVE
NRBC # BLD AUTO: 0 10E3/UL
NRBC BLD AUTO-RTO: 0 /100
PH UR STRIP: 6 [PH] (ref 5–7)
PLAT MORPH BLD: NORMAL
PLATELET # BLD AUTO: 182 10E3/UL (ref 150–450)
POLYCHROMASIA BLD QL SMEAR: NORMAL
POTASSIUM SERPL-SCNC: 3.9 MMOL/L (ref 3.4–5.3)
RBC # BLD AUTO: 3.29 10E6/UL (ref 4.4–5.9)
RBC AGGLUT BLD QL: NORMAL
RBC MORPH BLD: NORMAL
RBC URINE: >182 /HPF
ROULEAUX BLD QL SMEAR: NORMAL
SICKLE CELLS BLD QL SMEAR: NORMAL
SMUDGE CELLS BLD QL SMEAR: NORMAL
SODIUM SERPL-SCNC: 138 MMOL/L (ref 135–145)
SP GR UR STRIP: 1.01 (ref 1–1.03)
SPERM #/AREA URNS HPF: ABNORMAL /HPF
SPHEROCYTES BLD QL SMEAR: NORMAL
STOMATOCYTES BLD QL SMEAR: NORMAL
TARGETS BLD QL SMEAR: NORMAL
TOXIC GRANULES BLD QL SMEAR: NORMAL
UROBILINOGEN UR STRIP-MCNC: NORMAL MG/DL
VARIANT LYMPHS BLD QL SMEAR: NORMAL
WBC # BLD AUTO: 17 10E3/UL (ref 4–11)
WBC URINE: >100 /HPF

## 2024-01-07 PROCEDURE — 36415 COLL VENOUS BLD VENIPUNCTURE: CPT | Performed by: EMERGENCY MEDICINE

## 2024-01-07 PROCEDURE — 81001 URINALYSIS AUTO W/SCOPE: CPT | Performed by: EMERGENCY MEDICINE

## 2024-01-07 PROCEDURE — 83605 ASSAY OF LACTIC ACID: CPT | Performed by: NURSE PRACTITIONER

## 2024-01-07 PROCEDURE — 82962 GLUCOSE BLOOD TEST: CPT

## 2024-01-07 PROCEDURE — 36415 COLL VENOUS BLD VENIPUNCTURE: CPT | Performed by: NURSE PRACTITIONER

## 2024-01-07 PROCEDURE — 99285 EMERGENCY DEPT VISIT HI MDM: CPT | Mod: 25

## 2024-01-07 PROCEDURE — 250N000011 HC RX IP 250 OP 636: Performed by: NURSE PRACTITIONER

## 2024-01-07 PROCEDURE — 99223 1ST HOSP IP/OBS HIGH 75: CPT | Mod: AI | Performed by: NURSE PRACTITIONER

## 2024-01-07 PROCEDURE — 80048 BASIC METABOLIC PNL TOTAL CA: CPT | Performed by: EMERGENCY MEDICINE

## 2024-01-07 PROCEDURE — 87040 BLOOD CULTURE FOR BACTERIA: CPT | Performed by: EMERGENCY MEDICINE

## 2024-01-07 PROCEDURE — 120N000001 HC R&B MED SURG/OB

## 2024-01-07 PROCEDURE — 250N000011 HC RX IP 250 OP 636: Performed by: EMERGENCY MEDICINE

## 2024-01-07 PROCEDURE — 85025 COMPLETE CBC W/AUTO DIFF WBC: CPT | Performed by: EMERGENCY MEDICINE

## 2024-01-07 PROCEDURE — 87040 BLOOD CULTURE FOR BACTERIA: CPT | Performed by: NURSE PRACTITIONER

## 2024-01-07 PROCEDURE — 96374 THER/PROPH/DIAG INJ IV PUSH: CPT

## 2024-01-07 RX ORDER — LIDOCAINE HYDROCHLORIDE 20 MG/ML
SOLUTION OROPHARYNGEAL
Status: COMPLETED
Start: 2024-01-07 | End: 2024-01-08

## 2024-01-07 RX ORDER — ONDANSETRON 4 MG/1
4 TABLET, ORALLY DISINTEGRATING ORAL EVERY 6 HOURS PRN
Status: DISCONTINUED | OUTPATIENT
Start: 2024-01-07 | End: 2024-01-09 | Stop reason: HOSPADM

## 2024-01-07 RX ORDER — DEXTROSE MONOHYDRATE 25 G/50ML
25-50 INJECTION, SOLUTION INTRAVENOUS
Status: DISCONTINUED | OUTPATIENT
Start: 2024-01-07 | End: 2024-01-09 | Stop reason: HOSPADM

## 2024-01-07 RX ORDER — ACETAMINOPHEN 325 MG/1
650 TABLET ORAL EVERY 4 HOURS PRN
Status: DISCONTINUED | OUTPATIENT
Start: 2024-01-07 | End: 2024-01-09 | Stop reason: HOSPADM

## 2024-01-07 RX ORDER — MECLIZINE HYDROCHLORIDE 25 MG/1
25 TABLET ORAL 3 TIMES DAILY PRN
Status: DISCONTINUED | OUTPATIENT
Start: 2024-01-07 | End: 2024-01-09 | Stop reason: HOSPADM

## 2024-01-07 RX ORDER — LOSARTAN POTASSIUM 50 MG/1
50 TABLET ORAL DAILY
Status: DISCONTINUED | OUTPATIENT
Start: 2024-01-08 | End: 2024-01-09 | Stop reason: HOSPADM

## 2024-01-07 RX ORDER — CEFTRIAXONE 1 G/1
1 INJECTION, POWDER, FOR SOLUTION INTRAMUSCULAR; INTRAVENOUS ONCE
Status: DISCONTINUED | OUTPATIENT
Start: 2024-01-07 | End: 2024-01-07

## 2024-01-07 RX ORDER — FUROSEMIDE 20 MG
20 TABLET ORAL 2 TIMES DAILY
Status: DISCONTINUED | OUTPATIENT
Start: 2024-01-07 | End: 2024-01-09 | Stop reason: HOSPADM

## 2024-01-07 RX ORDER — LIDOCAINE HYDROCHLORIDE 20 MG/ML
6 JELLY TOPICAL ONCE
Status: COMPLETED | OUTPATIENT
Start: 2024-01-07 | End: 2024-01-08

## 2024-01-07 RX ORDER — VITAMIN B COMPLEX
2000 TABLET ORAL DAILY
Status: DISCONTINUED | OUTPATIENT
Start: 2024-01-08 | End: 2024-01-09 | Stop reason: HOSPADM

## 2024-01-07 RX ORDER — ACETAMINOPHEN 650 MG/1
650 SUPPOSITORY RECTAL EVERY 4 HOURS PRN
Status: DISCONTINUED | OUTPATIENT
Start: 2024-01-07 | End: 2024-01-09 | Stop reason: HOSPADM

## 2024-01-07 RX ORDER — METOPROLOL TARTRATE 50 MG
50 TABLET ORAL 2 TIMES DAILY
Status: DISCONTINUED | OUTPATIENT
Start: 2024-01-07 | End: 2024-01-09 | Stop reason: HOSPADM

## 2024-01-07 RX ORDER — ONDANSETRON 2 MG/ML
4 INJECTION INTRAMUSCULAR; INTRAVENOUS EVERY 6 HOURS PRN
Status: DISCONTINUED | OUTPATIENT
Start: 2024-01-07 | End: 2024-01-09 | Stop reason: HOSPADM

## 2024-01-07 RX ORDER — GABAPENTIN 100 MG/1
100 CAPSULE ORAL 2 TIMES DAILY
Status: DISCONTINUED | OUTPATIENT
Start: 2024-01-07 | End: 2024-01-09 | Stop reason: HOSPADM

## 2024-01-07 RX ORDER — FENTANYL CITRATE 50 UG/ML
50 INJECTION, SOLUTION INTRAMUSCULAR; INTRAVENOUS ONCE
Status: COMPLETED | OUTPATIENT
Start: 2024-01-07 | End: 2024-01-07

## 2024-01-07 RX ORDER — POLYETHYLENE GLYCOL 3350 17 G/17G
17 POWDER, FOR SOLUTION ORAL 2 TIMES DAILY PRN
Status: DISCONTINUED | OUTPATIENT
Start: 2024-01-07 | End: 2024-01-09 | Stop reason: HOSPADM

## 2024-01-07 RX ORDER — NICOTINE POLACRILEX 4 MG
15-30 LOZENGE BUCCAL
Status: DISCONTINUED | OUTPATIENT
Start: 2024-01-07 | End: 2024-01-09 | Stop reason: HOSPADM

## 2024-01-07 RX ORDER — AMOXICILLIN 250 MG
2 CAPSULE ORAL 2 TIMES DAILY PRN
Status: DISCONTINUED | OUTPATIENT
Start: 2024-01-07 | End: 2024-01-09 | Stop reason: HOSPADM

## 2024-01-07 RX ORDER — AMOXICILLIN 250 MG
1 CAPSULE ORAL 2 TIMES DAILY PRN
Status: DISCONTINUED | OUTPATIENT
Start: 2024-01-07 | End: 2024-01-09 | Stop reason: HOSPADM

## 2024-01-07 RX ORDER — GLIPIZIDE 10 MG/1
20 TABLET, FILM COATED, EXTENDED RELEASE ORAL DAILY
Status: DISCONTINUED | OUTPATIENT
Start: 2024-01-08 | End: 2024-01-09 | Stop reason: HOSPADM

## 2024-01-07 RX ORDER — CEFTRIAXONE 1 G/1
1 INJECTION, POWDER, FOR SOLUTION INTRAMUSCULAR; INTRAVENOUS EVERY 24 HOURS
Status: DISCONTINUED | OUTPATIENT
Start: 2024-01-07 | End: 2024-01-09 | Stop reason: HOSPADM

## 2024-01-07 RX ORDER — LEVOTHYROXINE SODIUM 112 UG/1
112 TABLET ORAL DAILY
Status: DISCONTINUED | OUTPATIENT
Start: 2024-01-08 | End: 2024-01-09 | Stop reason: HOSPADM

## 2024-01-07 RX ORDER — TAMSULOSIN HYDROCHLORIDE 0.4 MG/1
0.8 CAPSULE ORAL DAILY
Status: DISCONTINUED | OUTPATIENT
Start: 2024-01-08 | End: 2024-01-09 | Stop reason: HOSPADM

## 2024-01-07 RX ORDER — SIMVASTATIN 10 MG
40 TABLET ORAL AT BEDTIME
Status: DISCONTINUED | OUTPATIENT
Start: 2024-01-07 | End: 2024-01-09 | Stop reason: HOSPADM

## 2024-01-07 RX ADMIN — CEFTRIAXONE 1 G: 1 INJECTION, POWDER, FOR SOLUTION INTRAMUSCULAR; INTRAVENOUS at 22:24

## 2024-01-07 RX ADMIN — FENTANYL CITRATE 50 MCG: 0.05 INJECTION, SOLUTION INTRAMUSCULAR; INTRAVENOUS at 20:09

## 2024-01-07 ASSESSMENT — ACTIVITIES OF DAILY LIVING (ADL)
ADLS_ACUITY_SCORE: 35

## 2024-01-07 NOTE — ED TRIAGE NOTES
Pt arrives via EMS from home where pt lives with his handicapped wife. Pt is the main caretaker of her. Pt was here Friday and left AMA due to needing to take care of his wife. He is back today due to bladder pain. Pt had recent bladder surgery on 1/3/24 and they placed an indwelling agarwal catheter. Pt is concerned about the blood clots in catheter. Pt is A/Ox4, VSS, and ABC's intact.      Triage Assessment (Adult)       Row Name 01/07/24 1620          Triage Assessment    Airway WDL WDL        Respiratory WDL    Respiratory WDL WDL        Skin Circulation/Temperature WDL    Skin Circulation/Temperature WDL WDL        Cardiac WDL    Cardiac WDL WDL        Peripheral/Neurovascular WDL    Peripheral Neurovascular WDL WDL        Cognitive/Neuro/Behavioral WDL    Cognitive/Neuro/Behavioral WDL WDL

## 2024-01-08 ENCOUNTER — TELEPHONE (OUTPATIENT)
Dept: UROLOGY | Facility: CLINIC | Age: 84
End: 2024-01-08
Payer: MEDICARE

## 2024-01-08 LAB
ALBUMIN SERPL BCG-MCNC: 3.7 G/DL (ref 3.5–5.2)
ALP SERPL-CCNC: 60 U/L (ref 40–150)
ALT SERPL W P-5'-P-CCNC: 9 U/L (ref 0–70)
ANION GAP SERPL CALCULATED.3IONS-SCNC: 9 MMOL/L (ref 7–15)
AST SERPL W P-5'-P-CCNC: 13 U/L (ref 0–45)
BILIRUB SERPL-MCNC: 1 MG/DL
BUN SERPL-MCNC: 16.8 MG/DL (ref 8–23)
CALCIUM SERPL-MCNC: 8.5 MG/DL (ref 8.8–10.2)
CHLORIDE SERPL-SCNC: 104 MMOL/L (ref 98–107)
CREAT SERPL-MCNC: 1.01 MG/DL (ref 0.67–1.17)
DEPRECATED HCO3 PLAS-SCNC: 26 MMOL/L (ref 22–29)
EGFRCR SERPLBLD CKD-EPI 2021: 74 ML/MIN/1.73M2
ERYTHROCYTE [DISTWIDTH] IN BLOOD BY AUTOMATED COUNT: 12.9 % (ref 10–15)
GLUCOSE BLDC GLUCOMTR-MCNC: 208 MG/DL (ref 70–99)
GLUCOSE BLDC GLUCOMTR-MCNC: 225 MG/DL (ref 70–99)
GLUCOSE BLDC GLUCOMTR-MCNC: 261 MG/DL (ref 70–99)
GLUCOSE SERPL-MCNC: 259 MG/DL (ref 70–99)
HCT VFR BLD AUTO: 33.3 % (ref 40–53)
HGB BLD-MCNC: 11.5 G/DL (ref 13.3–17.7)
HGB BLD-MCNC: 11.9 G/DL (ref 13.3–17.7)
INR PPP: 1.05 (ref 0.85–1.15)
MCH RBC QN AUTO: 34.2 PG (ref 26.5–33)
MCHC RBC AUTO-ENTMCNC: 34.5 G/DL (ref 31.5–36.5)
MCV RBC AUTO: 99 FL (ref 78–100)
PLATELET # BLD AUTO: 170 10E3/UL (ref 150–450)
POTASSIUM SERPL-SCNC: 4 MMOL/L (ref 3.4–5.3)
PROT SERPL-MCNC: 6.6 G/DL (ref 6.4–8.3)
RBC # BLD AUTO: 3.36 10E6/UL (ref 4.4–5.9)
SODIUM SERPL-SCNC: 139 MMOL/L (ref 135–145)
WBC # BLD AUTO: 15.7 10E3/UL (ref 4–11)

## 2024-01-08 PROCEDURE — 85027 COMPLETE CBC AUTOMATED: CPT | Performed by: NURSE PRACTITIONER

## 2024-01-08 PROCEDURE — 36415 COLL VENOUS BLD VENIPUNCTURE: CPT | Performed by: INTERNAL MEDICINE

## 2024-01-08 PROCEDURE — 85610 PROTHROMBIN TIME: CPT | Performed by: NURSE PRACTITIONER

## 2024-01-08 PROCEDURE — 250N000009 HC RX 250

## 2024-01-08 PROCEDURE — 250N000012 HC RX MED GY IP 250 OP 636 PS 637: Performed by: NURSE PRACTITIONER

## 2024-01-08 PROCEDURE — 250N000013 HC RX MED GY IP 250 OP 250 PS 637: Performed by: INTERNAL MEDICINE

## 2024-01-08 PROCEDURE — 250N000009 HC RX 250: Performed by: NURSE PRACTITIONER

## 2024-01-08 PROCEDURE — 120N000001 HC R&B MED SURG/OB

## 2024-01-08 PROCEDURE — 99232 SBSQ HOSP IP/OBS MODERATE 35: CPT | Performed by: INTERNAL MEDICINE

## 2024-01-08 PROCEDURE — 250N000011 HC RX IP 250 OP 636: Performed by: NURSE PRACTITIONER

## 2024-01-08 PROCEDURE — 85018 HEMOGLOBIN: CPT | Performed by: INTERNAL MEDICINE

## 2024-01-08 PROCEDURE — 258N000001 HC RX 258: Performed by: NURSE PRACTITIONER

## 2024-01-08 PROCEDURE — 36415 COLL VENOUS BLD VENIPUNCTURE: CPT | Performed by: NURSE PRACTITIONER

## 2024-01-08 PROCEDURE — 84155 ASSAY OF PROTEIN SERUM: CPT | Performed by: NURSE PRACTITIONER

## 2024-01-08 PROCEDURE — 82247 BILIRUBIN TOTAL: CPT | Performed by: NURSE PRACTITIONER

## 2024-01-08 PROCEDURE — 250N000013 HC RX MED GY IP 250 OP 250 PS 637: Performed by: NURSE PRACTITIONER

## 2024-01-08 PROCEDURE — 99222 1ST HOSP IP/OBS MODERATE 55: CPT | Performed by: UROLOGY

## 2024-01-08 PROCEDURE — 82962 GLUCOSE BLOOD TEST: CPT

## 2024-01-08 RX ORDER — HYDROMORPHONE HYDROCHLORIDE 2 MG/1
2 TABLET ORAL
Status: DISCONTINUED | OUTPATIENT
Start: 2024-01-08 | End: 2024-01-09 | Stop reason: HOSPADM

## 2024-01-08 RX ADMIN — ACETAMINOPHEN 650 MG: 325 TABLET, FILM COATED ORAL at 17:38

## 2024-01-08 RX ADMIN — SODIUM CHLORIDE: 900 IRRIGANT IRRIGATION at 01:41

## 2024-01-08 RX ADMIN — FUROSEMIDE 20 MG: 20 TABLET ORAL at 09:30

## 2024-01-08 RX ADMIN — METOPROLOL TARTRATE 50 MG: 50 TABLET, FILM COATED ORAL at 19:58

## 2024-01-08 RX ADMIN — METOPROLOL TARTRATE 50 MG: 50 TABLET, FILM COATED ORAL at 09:31

## 2024-01-08 RX ADMIN — SIMVASTATIN 40 MG: 10 TABLET, FILM COATED ORAL at 22:01

## 2024-01-08 RX ADMIN — HYDROMORPHONE HYDROCHLORIDE 2 MG: 2 TABLET ORAL at 17:38

## 2024-01-08 RX ADMIN — CEFTRIAXONE 1 G: 1 INJECTION, POWDER, FOR SOLUTION INTRAMUSCULAR; INTRAVENOUS at 22:15

## 2024-01-08 RX ADMIN — LOSARTAN POTASSIUM 50 MG: 25 TABLET, FILM COATED ORAL at 09:30

## 2024-01-08 RX ADMIN — GABAPENTIN 100 MG: 100 CAPSULE ORAL at 09:30

## 2024-01-08 RX ADMIN — LIDOCAINE HYDROCHLORIDE 6 ML: 20 JELLY TOPICAL at 01:40

## 2024-01-08 RX ADMIN — LIDOCAINE HYDROCHLORIDE: 20 SOLUTION ORAL; TOPICAL at 01:40

## 2024-01-08 RX ADMIN — Medication 2000 UNITS: at 09:30

## 2024-01-08 RX ADMIN — TAMSULOSIN HYDROCHLORIDE 0.8 MG: 0.4 CAPSULE ORAL at 09:30

## 2024-01-08 RX ADMIN — LEVOTHYROXINE SODIUM 112 MCG: 0.11 TABLET ORAL at 09:30

## 2024-01-08 RX ADMIN — GABAPENTIN 100 MG: 100 CAPSULE ORAL at 19:58

## 2024-01-08 RX ADMIN — FUROSEMIDE 20 MG: 20 TABLET ORAL at 19:59

## 2024-01-08 ASSESSMENT — ACTIVITIES OF DAILY LIVING (ADL)
DOING_ERRANDS_INDEPENDENTLY_DIFFICULTY: NO
ADLS_ACUITY_SCORE: 22
ADLS_ACUITY_SCORE: 35
ADLS_ACUITY_SCORE: 37
ADLS_ACUITY_SCORE: 35
CONCENTRATING,_REMEMBERING_OR_MAKING_DECISIONS_DIFFICULTY: NO
CHANGE_IN_FUNCTIONAL_STATUS_SINCE_ONSET_OF_CURRENT_ILLNESS/INJURY: NO
ADLS_ACUITY_SCORE: 35
EQUIPMENT_CURRENTLY_USED_AT_HOME: CANE, STRAIGHT;WALKER, STANDARD
ADLS_ACUITY_SCORE: 37
FALL_HISTORY_WITHIN_LAST_SIX_MONTHS: NO
DIFFICULTY_COMMUNICATING: NO
WALKING_OR_CLIMBING_STAIRS_DIFFICULTY: NO
ADLS_ACUITY_SCORE: 37
WEAR_GLASSES_OR_BLIND: YES
ADLS_ACUITY_SCORE: 35
HEARING_DIFFICULTY_OR_DEAF: NO
ADLS_ACUITY_SCORE: 35
ADLS_ACUITY_SCORE: 37
ADLS_ACUITY_SCORE: 37
DRESSING/BATHING_DIFFICULTY: NO
ADLS_ACUITY_SCORE: 37
TOILETING_ISSUES: NO
DIFFICULTY_EATING/SWALLOWING: NO

## 2024-01-08 NOTE — TELEPHONE ENCOUNTER
Spoke with patient and informed that the hospital where he is at, will decide whether he needs a catheter put back in and then explained to patient that Dr. Daniels recommended he come back to office for trial of void at a later time and then to be taught SIC if needed. Patient was not happy with this and will mention his current symptoms to his care team at the hospital.     Shania Gilman LPN

## 2024-01-08 NOTE — ED NOTES
Report received, assumed care of patient, patient resting in bed, arouses to verbal stimuli, denies complaints at present.  CBI infusing with pinkish drainage, no signs of clots or bleeding. Vitals stable.

## 2024-01-08 NOTE — TELEPHONE ENCOUNTER
[facility] Call Center    Phone Message    May a detailed message be left on voicemail: yes     Reason for Call: Other: Patient is currently at [facility].  Per patient, Dr. Daniels took his stent out this morning.  He has not been able to urinate and they have told him he will probably need a catheter put in.  Per patient, Dr. Daniels said something to him about wanting to use a camera on him and he would like to speak with Dr. Daniels or the nurses in regards to next steps. He said putting another catheter in him would take him back a few steps. Writer was not able to reach backline. Please reach out to patient.      Action Taken: Message routed to:  Other: URology    Travel Screening: Not Applicable
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
## 2024-01-08 NOTE — ED NOTES
[facility]  ED Nurse Handoff Report    ED Chief complaint: Catheter Problem  . ED Diagnosis:   Final diagnoses:   Urinary retention   Gross hematuria   Urinary tract infection associated with indwelling urethral catheter, initial encounter        Allergies:   Allergies   Allergen Reactions    Shellfish Allergy Anaphylaxis    Iodinated Contrast Media Hives    Morphine And Related Nausea and Vomiting    Prednisone GI Disturbance     Unstable blood sugars    Tylenol W/Codeine [Acetaminophen-Codeine] Nausea and Vomiting     Weakness       Code Status: Full Code    Activity level - Baseline/Home:  independent.  Activity Level - Current:   independent.   Lift room needed: No.   Bariatric: No   Needed: No   Isolation: No.   Infection: Not Applicable.     Respiratory status: Room air    Vital Signs (within 30 minutes):   Vitals:    01/07/24 1627 01/07/24 1630   BP: (!) 145/118 119/82   Pulse: 77 75   Resp: 18    Temp: 98.5  F (36.9  C)    TempSrc: Oral    SpO2: 96% 98%       Cardiac Rhythm:  ,      Pain level:    Patient confused: No.   Patient Falls Risk: nonskid shoes/slippers when out of bed.   Elimination Status: Urethral catheter (agarwal) in place; orders for patient to discharge with agarwal      Patient Report - Initial Complaint: Urinary retention.   Focused Assessment: Mark Shultz is a 83 year old male with history of transurethral bladder tumor resection January 3, 2020 24th, with ED visit 2 days ago for gross hematuria and intermittent catheter obstruction and refusing admission for CBI, who presents with Agarwal catheter problem.  Specifically, the patient notes that since earlier today, he has had no urine output from the Agarwal.  He notes discomfort when he feels like he has to void adequately has had some bloody output from the catheter over the last few days but mostly clear since discharge from the ED.  He denies fevers, chills, nausea, vomiting, or any other concerns       Abnormal Results:   Labs Ordered and Resulted from Time of ED Arrival to Time of ED Departure   ROUTINE UA WITH MICROSCOPIC - Abnormal       Result Value    Color Urine Dark Yellow (*)     Appearance Urine Slightly Cloudy (*)     Glucose Urine 200 (*)     Bilirubin Urine Negative      Ketones Urine Negative      Specific Gravity Urine 1.015      Blood Urine Large (*)     pH Urine 6.0      Protein Albumin Urine 100 (*)     Urobilinogen Urine Normal      Nitrite Urine Negative      Leukocyte Esterase Urine Moderate (*)     Bacteria Urine Few (*)     Mucus Urine Present (*)     Sperm Urine None Seen      RBC Urine >182 (*)     WBC Urine >100 (*)    CBC WITH PLATELETS AND DIFFERENTIAL - Abnormal    WBC Count 17.0 (*)     RBC Count 3.29 (*)     Hemoglobin 11.2 (*)     Hematocrit 32.8 (*)           MCH 34.0 (*)     MCHC 34.1      RDW 13.0      Platelet Count 182      % Neutrophils 54      % Lymphocytes 39      % Monocytes 6      % Eosinophils 1      % Basophils 0      % Immature Granulocytes 0      NRBCs per 100 WBC 0      Absolute Neutrophils 9.1 (*)     Absolute Lymphocytes 6.7 (*)     Absolute Monocytes 1.0      Absolute Eosinophils 0.1      Absolute Basophils 0.0      Absolute Immature Granulocytes 0.1      Absolute NRBCs 0.0     BASIC METABOLIC PANEL - Abnormal    Sodium 138      Potassium 3.9      Chloride 103      Carbon Dioxide (CO2) 25      Anion Gap 10      Urea Nitrogen 19.7      Creatinine 1.11      GFR Estimate 66      Calcium 8.7 (*)     Glucose 244 (*)    LACTIC ACID WHOLE BLOOD - Normal    Lactic Acid 1.2     RBC AND PLATELET MORPHOLOGY    Platelet Assessment        Value: Automated Count Confirmed. Platelet morphology is normal.    Acanthocytes        Kevin Rods        Basophilic Stippling        Bite Cells        Blister Cells        Ol[place] Cells        Elliptocytes        Hgb C Crystals        Ruiz-Jolly Bodies        Hypersegmented Neutrophils        Polychromasia        RBC agglutination         RBC Fragments        Reactive Lymphocytes        Rouleaux        Sickle Cells        Smudge Cells        Spherocytes        Stomatocytes        Target Cells        Teardrop Cells        Toxic Neutrophils        RBC Morphology Confirmed RBC Indices     GLUCOSE MONITOR NURSING POCT   GLUCOSE MONITOR NURSING POCT   BLOOD CULTURE   BLOOD CULTURE        No orders to display       Treatments provided: Labs, meds, scans  Family Comments: Patient updates family independently  OBS brochure/video discussed/provided to patient:  Yes  ED Medications:   Medications   lidocaine (XYLOCAINE) 2 % external gel 6 mL (has no administration in time range)   lidocaine (viscous) (XYLOCAINE) 2 % solution (has no administration in time range)   sodium chloride 0.9% irrigation (bag) (has no administration in time range)   cefTRIAXone (ROCEPHIN) 1 g vial to attach to  mL bag for ADULTS or NS 50 mL bag for PEDS (has no administration in time range)   vitamin D CAPS 2,000 Units (has no administration in time range)   furosemide (LASIX) tablet 20 mg (has no administration in time range)   gabapentin (NEURONTIN) capsule 100 mg (has no administration in time range)   glipiZIDE (GLUCOTROL XL) 24 hr tablet 20 mg ( Oral Automatically Held 1/11/24 0800)   levothyroxine (SYNTHROID/LEVOTHROID) tablet 112 mcg (has no administration in time range)   losartan (COZAAR) tablet 50 mg (has no administration in time range)   meclizine (ANTIVERT) tablet 25 mg (has no administration in time range)   metFORMIN (GLUCOPHAGE) tablet 1,000 mg ( Oral Automatically Held 1/10/24 1800)   metoprolol tartrate (LOPRESSOR) tablet 50 mg (has no administration in time range)   simvastatin (ZOCOR) tablet 40 mg (has no administration in time range)   tamsulosin (FLOMAX) capsule 0.8 mg (has no administration in time range)   senna-docusate (SENOKOT-S/PERICOLACE) 8.6-50 MG per tablet 1 tablet (has no administration in time range)     Or   senna-docusate  (SENOKOT-S/PERICOLACE) 8.6-50 MG per tablet 2 tablet (has no administration in time range)   ondansetron (ZOFRAN ODT) ODT tab 4 mg (has no administration in time range)     Or   ondansetron (ZOFRAN) injection 4 mg (has no administration in time range)   polyethylene glycol (MIRALAX) Packet 17 g (has no administration in time range)   acetaminophen (TYLENOL) tablet 650 mg (has no administration in time range)     Or   acetaminophen (TYLENOL) Suppository 650 mg (has no administration in time range)   glucose gel 15-30 g (has no administration in time range)     Or   dextrose 50 % injection 25-50 mL (has no administration in time range)     Or   glucagon injection 1 mg (has no administration in time range)   insulin aspart (NovoLOG) injection (RAPID ACTING) (has no administration in time range)   insulin aspart (NovoLOG) injection (RAPID ACTING) (has no administration in time range)   fentaNYL (PF) (SUBLIMAZE) injection 50 mcg (50 mcg Intravenous $Given 1/7/24 2009)       Drips infusing:  No  For the majority of the shift this patient was Green.   Interventions performed were N/A.    Sepsis treatment initiated: No    Cares/treatment/interventions/medications to be completed following ED care: N/A    ED Nurse Name: Amna Dean RN  9:47 PM     RECEIVING UNIT ED HANDOFF REVIEW    Above ED Nurse Handoff Report was reviewed: Yes  Reviewed by: David Curtis RN on January 8, 2024 at 6:31 PM

## 2024-01-08 NOTE — ED NOTES
Patient ambulated to restroom for BM, steady gait, no complaints at this time, returned to bed, CBI infusing with no issues, pink return with no clots.

## 2024-01-08 NOTE — ED NOTES
Attempted to remove Pt. Catheter. End of catheter will not come out of urethra- feels stuck. Writer got MD. MD unable to remove catheter. Pt. Tolerated procedure ok.

## 2024-01-08 NOTE — PHARMACY-ADMISSION MEDICATION HISTORY
Admission Medication History    Admission medication history is complete. The information provided in this note is only as accurate as the sources available at the time of the update.    Medication history and patient interview completed by admitting provider. Reviewed by pharmacist, including [employer] dispense records, [facility] Care Everywhere, and chart review.     [facility]    PTA Med List   Medication Sig Last Dose    Cholecalciferol (VITAMIN D) 2000 UNITS CAPS Take 2,000 Units by mouth daily  1/7/2024    furosemide (LASIX) 20 MG tablet Take 20 mg by mouth 2 times daily 1/7/2024    gabapentin (NEURONTIN) 100 MG capsule Take 100 mg by mouth 2 times daily 1/7/2024    glipiZIDE (GLUCOTROL XL) 10 MG 24 hr tablet Take 20 mg by mouth daily  1/7/2024    insulin pen needle (31G X 8 MM) 31G X 8 MM miscellaneous 1 Box of 100 insulin pen needles to be dispensed with every insulin pen prescription Unknown    levothyroxine (SYNTHROID/LEVOTHROID) 112 MCG tablet Take 112 mcg by mouth daily 1/7/2024    losartan (COZAAR) 50 MG tablet Take 50 mg by mouth daily 1/6/2024    meclizine (ANTIVERT) 25 MG tablet Take 25 mg by mouth 3 times daily as needed More than a month    metFORMIN (GLUCOPHAGE) 1000 MG tablet Take 1,000 mg by mouth 2 times daily (with meals) 1/7/2024    metoprolol tartrate (LOPRESSOR) 50 MG tablet Take 50 mg by mouth 2 times daily 1/7/2024    rivaroxaban ANTICOAGULANT (XARELTO) 10 MG TABS tablet Take 10 mg by mouth daily (with dinner) Past Week    sitagliptin (JANUVIA) 100 MG tablet Take 100 mg by mouth daily 1/6/2024    tamsulosin (FLOMAX) 0.4 MG capsule Take 0.8 mg by mouth daily 1/7/2024

## 2024-01-08 NOTE — ED PROVIDER NOTES
History     Chief Complaint:  Catheter problem, hematuria       HPI   Mark Shultz is a 83 year old male with history of transurethral bladder tumor resection January 3, 2020 24th, with ED visit 2 days ago for gross hematuria and intermittent catheter obstruction and refusing admission for CBI, who presents with Pemberton catheter problem.  Specifically, the patient notes that since earlier today, he has had no urine output from the Pemberton.  He notes discomfort when he feels like he has to void adequately has had some bloody output from the catheter over the last few days but mostly clear since discharge from the ED.  He denies fevers, chills, nausea, vomiting, or any other concerns.        Independent Historian:   None     Review of External Notes: reviewed 1/3/24 hospitalization     ROS:  Review of Systems    Allergies:  Shellfish Allergy  Iodinated Contrast Media  Morphine And Related  Prednisone  Tylenol W/Codeine [Acetaminophen-Codeine]     Medications:    Cholecalciferol (VITAMIN D) 2000 UNITS CAPS  dexamethasone (DECADRON) 6 MG tablet  furosemide (LASIX) 20 MG tablet  gabapentin (NEURONTIN) 100 MG capsule  glipiZIDE (GLUCOTROL XL) 10 MG 24 hr tablet  insulin aspart (NOVOLOG FLEXPEN) 100 UNIT/ML pen  insulin glargine (LANTUS PEN) 100 UNIT/ML pen  insulin pen needle (31G X 8 MM) 31G X 8 MM miscellaneous  levothyroxine (SYNTHROID/LEVOTHROID) 112 MCG tablet  losartan (COZAAR) 50 MG tablet  meclizine (ANTIVERT) 25 MG tablet  metFORMIN (GLUCOPHAGE) 1000 MG tablet  metoprolol tartrate (LOPRESSOR) 50 MG tablet  rivaroxaban ANTICOAGULANT (XARELTO) 10 MG TABS tablet  simvastatin (ZOCOR) 40 MG tablet  sitagliptin (JANUVIA) 100 MG tablet  tamsulosin (FLOMAX) 0.4 MG capsule        Past History:    Past Medical History:   Diagnosis Date    Antiplatelet or antithrombotic long-term use     Arrhythmia     Arthritis     BPH (benign prostatic hyperplasia)     Brain tumor (H)     DIABETES 01/01/2002    Diabetes (H)      Hypercholesterolemia     Hypertension     Hypothyroid     Lumbago 01/01/1966    LVH (left ventricular hypertrophy)     Mumps     ABDIEL (obstructive sleep apnea)     Palpitations     Peripheral neuropathy          Physical Exam     Patient Vitals for the past 24 hrs:   BP Temp Temp src Pulse Resp SpO2   05/12/23 0247 113/73 97.8  F (36.6  C) Temporal 95 20 98 %        Physical Exam  Constitutional: Alert, attentive  HENT:    Nose: Nose normal.   Eyes: EOM are normal.   CV: regular rate and rhythm; no murmurs, rubs or gallups  Chest: Effort normal and breath sounds normal.   GI:  There is no tenderness. No distension. Normal bowel sounds  : Pemberton catheter to the penis with no current drainage.  Residual urine in the bag is pink.  MSK: Normal range of motion.   Neurological: Alert, attentive  Skin: Skin is warm and dry.      Emergency Department Course       Results for orders placed or performed during the hospital encounter of 01/07/24   CBC with platelets and differential     Status: Abnormal   Result Value Ref Range    WBC Count 17.0 (H) 4.0 - 11.0 10e3/uL    RBC Count 3.29 (L) 4.40 - 5.90 10e6/uL    Hemoglobin 11.2 (L) 13.3 - 17.7 g/dL    Hematocrit 32.8 (L) 40.0 - 53.0 %     78 - 100 fL    MCH 34.0 (H) 26.5 - 33.0 pg    MCHC 34.1 31.5 - 36.5 g/dL    RDW 13.0 10.0 - 15.0 %    Platelet Count 182 150 - 450 10e3/uL    % Neutrophils 54 %    % Lymphocytes 39 %    % Monocytes 6 %    % Eosinophils 1 %    % Basophils 0 %    % Immature Granulocytes 0 %    NRBCs per 100 WBC 0 <1 /100    Absolute Neutrophils 9.1 (H) 1.6 - 8.3 10e3/uL    Absolute Lymphocytes 6.7 (H) 0.8 - 5.3 10e3/uL    Absolute Monocytes 1.0 0.0 - 1.3 10e3/uL    Absolute Eosinophils 0.1 0.0 - 0.7 10e3/uL    Absolute Basophils 0.0 0.0 - 0.2 10e3/uL    Absolute Immature Granulocytes 0.1 <=0.4 10e3/uL    Absolute NRBCs 0.0 10e3/uL   RBC and Platelet Morphology     Status: None   Result Value Ref Range    Platelet Assessment  Automated Count Confirmed.  Platelet morphology is normal.     Automated Count Confirmed. Platelet morphology is normal.    Acanthocytes      Kevin Rods      Basophilic Stippling      Bite Cells      Blister Cells      Sherrill Cells      Elliptocytes      Hgb C Crystals      Ruiz-Jolly Bodies      Hypersegmented Neutrophils      Polychromasia      RBC agglutination      RBC Fragments      Reactive Lymphocytes      Rouleaux      Sickle Cells      Smudge Cells      Spherocytes      Stomatocytes      Target Cells      Teardrop Cells      Toxic Neutrophils      RBC Morphology Confirmed RBC Indices    CBC + differential     Status: Abnormal    Narrative    The following orders were created for panel order CBC + differential.  Procedure                               Abnormality         Status                     ---------                               -----------         ------                     CBC with platelets and d...[[id number]]  Abnormal            Final result               RBC and Platelet Morphology[[id number]]                      Final result                 Please view results for these tests on the individual orders.       Emergency Department Course & Assessments:       Initial Pemberton catheter removed and new 24 Serbian three-way catheter placed for CBI.  Patient is amenable to admission at this time.      Interventions:  Medications   lidocaine (XYLOCAINE) 2 % external gel 6 mL (has no administration in time range)   lidocaine (viscous) (XYLOCAINE) 2 % solution (has no administration in time range)   sodium chloride 0.9% irrigation (bag) (has no administration in time range)   fentaNYL (PF) (SUBLIMAZE) injection 50 mcg (50 mcg Intravenous $Given 1/7/24 2009)       Consultations/Discussion of Management or Tests:  Discussed the patient with urology   Discussed the patient with the admitting hospitalist service.    Disposition:  The patient was admitted to the hospital under the care of Dr. Acuña.     Impression & Plan      Medical  Decision Making:  This is an 83-year-old male with complex urologic history as noted above who presents for evaluation of recurrent urinary retention and suspect gross hematuria leading to organized clot in the bladder causing the same.  The patient may have underlying UTI but this is confounded by gross hematuria.  I consulted with urology, given the patient's CBI appears to be functioning/draining well, will plan for continuous bladder irrigation overnight and urology consult the morning.  Antibiotics initiated in the department.  He is safe for admission at this time.        Diagnosis:  Visit Diagnosis, Associated Orders, and Comments     ICD-10-CM    1. Urinary retention  R33.9       2. Gross hematuria  R31.0       3. Urinary tract infection associated with indwelling urethral catheter, initial encounter   T83.511A     N39.0         .       Heron Cantu MD  01/07/24 2111

## 2024-01-08 NOTE — PROGRESS NOTES
Urology chart.    Contacted patient's nurse in the emergency department.  Catheter has been removed since early this morning prior to 8 AM.  He has been unable to urinate since that time.  Discussed with nurse and Dr. Daniels.  Recommendation is for replacement of Pemberton catheter.  Given his history of difficulties with urination, would recommend repeat trial of void on 01/17/2023 and if not able to urinate, consideration for intermittent catheterization.    Would recommend replacement of 20 Finnish Pemberton coudé catheter.  Once this is placed, okay to discharge from urological perspective.  Nursing does note that patient has now had some hematochezia.    Lexi Fofana PA-C

## 2024-01-09 VITALS
HEART RATE: 63 BPM | WEIGHT: 195.99 LBS | RESPIRATION RATE: 20 BRPM | OXYGEN SATURATION: 94 % | DIASTOLIC BLOOD PRESSURE: 74 MMHG | BODY MASS INDEX: 30.7 KG/M2 | SYSTOLIC BLOOD PRESSURE: 151 MMHG | TEMPERATURE: 98.8 F

## 2024-01-09 DIAGNOSIS — C67.8 MALIGNANT NEOPLASM OF OVERLAPPING SITES OF BLADDER (H): ICD-10-CM

## 2024-01-09 DIAGNOSIS — C67.9 BLADDER CANCER (H): Primary | ICD-10-CM

## 2024-01-09 DIAGNOSIS — C67.8 MALIGNANT NEOPLASM OF OVERLAPPING SITES OF BLADDER (H): Primary | ICD-10-CM

## 2024-01-09 LAB
GLUCOSE BLDC GLUCOMTR-MCNC: 206 MG/DL (ref 70–99)
GLUCOSE BLDC GLUCOMTR-MCNC: 269 MG/DL (ref 70–99)

## 2024-01-09 PROCEDURE — 250N000013 HC RX MED GY IP 250 OP 250 PS 637: Performed by: NURSE PRACTITIONER

## 2024-01-09 PROCEDURE — 99231 SBSQ HOSP IP/OBS SF/LOW 25: CPT | Performed by: PHYSICIAN ASSISTANT

## 2024-01-09 PROCEDURE — 250N000012 HC RX MED GY IP 250 OP 636 PS 637: Performed by: NURSE PRACTITIONER

## 2024-01-09 PROCEDURE — 87086 URINE CULTURE/COLONY COUNT: CPT | Performed by: PHYSICIAN ASSISTANT

## 2024-01-09 PROCEDURE — 99239 HOSP IP/OBS DSCHRG MGMT >30: CPT | Performed by: INTERNAL MEDICINE

## 2024-01-09 RX ORDER — PETROLATUM,WHITE
OINTMENT IN PACKET (GRAM) TOPICAL
Status: CANCELLED | OUTPATIENT
Start: 2024-03-06

## 2024-01-09 RX ORDER — PETROLATUM,WHITE
OINTMENT IN PACKET (GRAM) TOPICAL
Status: CANCELLED | OUTPATIENT
Start: 2024-02-21

## 2024-01-09 RX ORDER — LIDOCAINE HYDROCHLORIDE 20 MG/ML
10 JELLY TOPICAL
Status: CANCELLED | OUTPATIENT
Start: 2024-04-04

## 2024-01-09 RX ORDER — LIDOCAINE HYDROCHLORIDE 20 MG/ML
10 JELLY TOPICAL
Status: CANCELLED | OUTPATIENT
Start: 2024-03-13

## 2024-01-09 RX ORDER — LIDOCAINE HYDROCHLORIDE 20 MG/ML
10 JELLY TOPICAL
Status: CANCELLED | OUTPATIENT
Start: 2024-03-20

## 2024-01-09 RX ORDER — PETROLATUM,WHITE
OINTMENT IN PACKET (GRAM) TOPICAL
Status: CANCELLED | OUTPATIENT
Start: 2024-04-04

## 2024-01-09 RX ORDER — CEFDINIR 300 MG/1
300 CAPSULE ORAL 2 TIMES DAILY
Qty: 10 CAPSULE | Refills: 0 | Status: SHIPPED | OUTPATIENT
Start: 2024-01-09 | End: 2024-08-11

## 2024-01-09 RX ORDER — LIDOCAINE HYDROCHLORIDE 20 MG/ML
10 JELLY TOPICAL
Status: CANCELLED | OUTPATIENT
Start: 2024-03-27

## 2024-01-09 RX ORDER — PETROLATUM,WHITE
OINTMENT IN PACKET (GRAM) TOPICAL
Status: CANCELLED | OUTPATIENT
Start: 2024-03-27

## 2024-01-09 RX ORDER — PETROLATUM,WHITE
OINTMENT IN PACKET (GRAM) TOPICAL
Status: CANCELLED | OUTPATIENT
Start: 2024-03-20

## 2024-01-09 RX ORDER — LIDOCAINE HYDROCHLORIDE 20 MG/ML
10 JELLY TOPICAL
Status: CANCELLED | OUTPATIENT
Start: 2024-03-06

## 2024-01-09 RX ORDER — PETROLATUM,WHITE
OINTMENT IN PACKET (GRAM) TOPICAL
Status: CANCELLED | OUTPATIENT
Start: 2024-03-13

## 2024-01-09 RX ORDER — HYDROMORPHONE HYDROCHLORIDE 2 MG/1
2 TABLET ORAL EVERY 6 HOURS PRN
Qty: 12 TABLET | Refills: 0 | Status: SHIPPED | OUTPATIENT
Start: 2024-01-09 | End: 2024-08-11

## 2024-01-09 RX ORDER — CEFDINIR 300 MG/1
300 CAPSULE ORAL 2 TIMES DAILY
Qty: 10 CAPSULE | Refills: 0 | Status: SHIPPED | OUTPATIENT
Start: 2024-01-09 | End: 2024-01-09

## 2024-01-09 RX ORDER — LIDOCAINE HYDROCHLORIDE 20 MG/ML
10 JELLY TOPICAL
Status: CANCELLED | OUTPATIENT
Start: 2024-02-21

## 2024-01-09 RX ORDER — LEVOFLOXACIN 500 MG/1
TABLET, FILM COATED ORAL
Qty: 12 TABLET | Refills: 0 | Status: SHIPPED | OUTPATIENT
Start: 2024-02-05 | End: 2024-03-06

## 2024-01-09 RX ADMIN — Medication 2000 UNITS: at 08:35

## 2024-01-09 RX ADMIN — TAMSULOSIN HYDROCHLORIDE 0.8 MG: 0.4 CAPSULE ORAL at 08:35

## 2024-01-09 RX ADMIN — METOPROLOL TARTRATE 50 MG: 50 TABLET, FILM COATED ORAL at 08:36

## 2024-01-09 RX ADMIN — LEVOTHYROXINE SODIUM 112 MCG: 0.11 TABLET ORAL at 08:36

## 2024-01-09 RX ADMIN — INSULIN ASPART 3 UNITS: 100 INJECTION, SOLUTION INTRAVENOUS; SUBCUTANEOUS at 08:34

## 2024-01-09 RX ADMIN — GABAPENTIN 100 MG: 100 CAPSULE ORAL at 08:36

## 2024-01-09 RX ADMIN — FUROSEMIDE 20 MG: 20 TABLET ORAL at 08:36

## 2024-01-09 ASSESSMENT — ACTIVITIES OF DAILY LIVING (ADL)
ADLS_ACUITY_SCORE: 24
ADLS_ACUITY_SCORE: 23
ADLS_ACUITY_SCORE: 24

## 2024-01-09 NOTE — PLAN OF CARE
Goal Outcome Evaluation:    Pt A&Ox4. VSS on RA. Up independently. Pemberton with pink tinged urine, good output. Denies pain, nausea, SOB.     Leg bag given to patient. Instructions given to patient regarding changing of bag. Catheter care instructions given. AVS given to patient, explained, questions answered. Paper prescription for dilaudid given to patient. Patient instructed to  medications from [facility]. Patient discharging home via [employer].

## 2024-01-09 NOTE — PROGRESS NOTES
Notified provider about indwelling agarwal catheter discussed removal or continued need.    Did provider choose to remove indwelling agarwal catheter? NO    Provider's agarwal indication for keeping indwelling agarwal catheter: Recent  procedure, retention     Is there an order for indwelling agarwal catheter? YES    *If there is a plan to keep agarwal catheter in place at discharge daily notification with provider is not necessary, but please add a notation in the treatment team sticky note that the patient will be discharging with the catheter.

## 2024-01-09 NOTE — PLAN OF CARE
Goal Outcome Evaluation:       [facility]    ED Boarding Nurse Handoff Addendum Report:    Date/time: 1/8/2024, 6:56 PM    Activity Level: independent    Fall Risk: Yes:  bed/chair alarm on, nonskid shoes/slippers when out of bed, arm band in place, patient and family education, assistive device/personal items within reach, and activity supervised    Active Infusions: None    Current Meds Due: none    Current care needs: Elimination, pain management     Oxygen requirements (liters/min and/or FiO2): none    Respiratory status: Room air    Vital signs (within last 30 minutes):    Vitals:    01/08/24 0115 01/08/24 0130 01/08/24 0745 01/08/24 0931   BP:    (!) 155/73   BP Location:    Left arm   Patient Position:    Semi-Gerard's   Cuff Size:    Adult Regular   Pulse:   66 72   Resp:   18    Temp:   97.7  F (36.5  C)    TempSrc:   Oral    SpO2: 96% 98%  98%       Focused assessment within last 30 minutes:    A&OX4. UP independently/ SBA. VSS. PRN PO Dilaudid given for lower abdomen pain.     ED Boarding Nurse name: Davin Andrews RN

## 2024-01-09 NOTE — PLAN OF CARE
A&Ox4. Up Ax1. VSS. RA. Denies pain. Urine joe color, no clots in bag. Pemberton did need to be irrigated due to not draining, and pressure for pt 2 different times. Was unable to irrigate but did start working after balloon was deflated & reinflated. Also noted foreskin was retracted and penis was swollen. Was able to pull back after some manipulation. Swelling decreased.   Blood sugar stable.    Major Shift Events: Pemberton needed to be irrigated x2 over night.     Treatment Plan: Rocephin, pain management, monitor urine output. Urology following    Bedside Nurse: Ngozi Shrestha RN

## 2024-01-09 NOTE — PROGRESS NOTES
[facility] Urology Progress Note          Assessment and Plan:     Assessment:    Gross hematuria    Urinary retention      Plan:   -Continue with indwelling Pemberton catheter.  Patient should discharge with this in place.  He has been set up for an appointment for repeat trial of voiding clinic on 01/17/2024.  He may need possible CIC teaching.  -Plan is for eventual induction course of BCG and then repeat cystoscopy after this.  -Would recommend continuing to hold his blood thinning medication for at least another 3 days.  At that point, could cautiously restart.  -Urine culture on 01/05/2024 did not have any growth.  Unable to add on urine culture from urine collected in the ER on 01/07/2024.  New collection was completed, but this was after antibiotics.  Unlikely to be helpful.    -Okay to discharge from urology perspective.  Should discharged with Pemberton catheter in place.      Lexi Fofana PA-C   [facility] Urology  [phone]               Interval History:     Doing okay.  Had trouble with the catheter twice last night.  Was able to have balloon deflated and adjusted.  Currently Pemberton catheter is in place draining clear pink urine.  Patient did have bladder spasms with hand irrigation this morning.  Afebrile with no tachycardia.  Initial urine culture on 01/05/2024 did not have any growth.  Denies N/V/F/C/SOB/CP.  On IV Rocephin.  No updated labs.              Review of Systems:     The 5 point Review of Systems is negative other than noted in the HPI             Medications:     Current Facility-Administered Medications Ordered in Epic   Medication Dose Route Frequency Last Rate Last Admin    acetaminophen (TYLENOL) tablet 650 mg  650 mg Oral Q4H PRN   650 mg at 01/08/24 1738    Or    acetaminophen (TYLENOL) Suppository 650 mg  650 mg Rectal Q4H PRN        cefTRIAXone (ROCEPHIN) 1 g vial to attach to  mL bag for ADULTS or NS 50 mL bag for PEDS  1 g Intravenous Q24H 0 mL/hr at  01/08/24 0139 1 g at 01/08/24 2215    glucose gel 15-30 g  15-30 g Oral Q15 Min PRN        Or    dextrose 50 % injection 25-50 mL  25-50 mL Intravenous Q15 Min PRN        Or    glucagon injection 1 mg  1 mg Subcutaneous Q15 Min PRN        furosemide (LASIX) tablet 20 mg  20 mg Oral BID   20 mg at 01/09/24 0836    gabapentin (NEURONTIN) capsule 100 mg  100 mg Oral BID   100 mg at 01/09/24 0836    [Held by provider] glipiZIDE (GLUCOTROL XL) 24 hr tablet 20 mg  20 mg Oral Daily        HYDROmorphone (DILAUDID) tablet 2 mg  2 mg Oral Q3H PRN   2 mg at 01/08/24 1738    insulin aspart (NovoLOG) injection (RAPID ACTING)  1-7 Units Subcutaneous TID AC   3 Units at 01/09/24 0834    insulin aspart (NovoLOG) injection (RAPID ACTING)  1-5 Units Subcutaneous At Bedtime        levothyroxine (SYNTHROID/LEVOTHROID) tablet 112 mcg  112 mcg Oral Daily   112 mcg at 01/09/24 0836    losartan (COZAAR) tablet 50 mg  50 mg Oral Daily   50 mg at 01/08/24 0930    meclizine (ANTIVERT) tablet 25 mg  25 mg Oral TID PRN        [Held by provider] metFORMIN (GLUCOPHAGE) tablet 1,000 mg  1,000 mg Oral BID w/meals        metoprolol tartrate (LOPRESSOR) tablet 50 mg  50 mg Oral BID   50 mg at 01/09/24 0836    ondansetron (ZOFRAN ODT) ODT tab 4 mg  4 mg Oral Q6H PRN        Or    ondansetron (ZOFRAN) injection 4 mg  4 mg Intravenous Q6H PRN        polyethylene glycol (MIRALAX) Packet 17 g  17 g Oral BID PRN        senna-docusate (SENOKOT-S/PERICOLACE) 8.6-50 MG per tablet 1 tablet  1 tablet Oral BID PRN        Or    senna-docusate (SENOKOT-S/PERICOLACE) 8.6-50 MG per tablet 2 tablet  2 tablet Oral BID PRN        simvastatin (ZOCOR) tablet 40 mg  40 mg Oral At Bedtime   40 mg at 01/08/24 2201    sodium chloride 0.9% irrigation (bag)   Irrigation Continuous   Infusion stopped per MD order at 01/08/24 1329    tamsulosin (FLOMAX) capsule 0.8 mg  0.8 mg Oral Daily   0.8 mg at 01/09/24 0835    Vitamin D3 (CHOLECALCIFEROL) tablet 2,000 Units  2,000 Units Oral  Daily   2,000 Units at 01/09/24 0835     No current [facility]-ordered outpatient medications on file.                  Physical Exam:   Vitals were reviewed  Patient Vitals for the past 8 hrs:   BP Temp Temp src Pulse Resp SpO2 Weight   01/09/24 0729 (!) 151/74 98.8  F (37.1  C) Oral 63 20 94 % --   01/09/24 0402 (!) 142/70 98.5  F (36.9  C) Oral 62 20 94 % 88.9 kg (195 lb 15.8 oz)     GEN: NAD, lying in bed  EYES: EOMI  MOUTH: MMM  NECK: Supple  RESP: Unlabored breathing  SKIN: Warm  ABD: soft  NEURO: AAO  URO: Catheter in place draining clear pink urine.  Hand irrigated several times with no return of clots.  Patient did have a large bladder spasm.           Data:     Lab Results   Component Value Date    NTBNPI 972 09/28/2021    NTBNPI 1,800 09/25/2021    NTBNPI 565 08/22/2021     Lab Results   Component Value Date    WBC 15.7 (H) 01/08/2024    WBC 17.0 (H) 01/07/2024    WBC 16.7 (H) 01/05/2024    HGB 11.9 (L) 01/08/2024    HGB 11.5 (L) 01/08/2024    HGB 11.2 (L) 01/07/2024    HCT 33.3 (L) 01/08/2024    HCT 32.8 (L) 01/07/2024    HCT 32.0 (L) 01/05/2024    MCV 99 01/08/2024     01/07/2024    MCV 98 01/05/2024     01/08/2024     01/07/2024     01/05/2024     Lab Results   Component Value Date    INR 1.05 01/08/2024      All cultures:  Recent Labs   Lab 01/07/24  2141 01/07/24 2050 01/05/24 2237   CULTURE No growth after 1 day No growth after 1 day No Growth

## 2024-01-09 NOTE — PLAN OF CARE
Assessments:   A/O x4. Up SBA. VSS. Complains of tenderness on penile area. Refused pain medications. Pemberton in place, no clots or sediments or blood in urine ouput. Catheter cares done. Normal small soft bmx1. HS , refused 1u Novolog coverage.     Major Shift Events:  Patient refused urine sample collection for urine culture. Per lab, more than 24 hours already so can not be done as add-on.     Treatment Plan: Rocephin, pain management, monitor urine output. Urology following    Bedside Nurse: David Curtis RN

## 2024-01-09 NOTE — DISCHARGE SUMMARY
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    Weight as of this encounter: 88.9 kg (195 lb 15.8 oz).       Follow-ups Needed After Discharge   Follow-up Appointments     Follow-up and recommended labs and tests       Follow up with primary care provider, Abe Eng, within 7 days   for hospital follow- up.  The following labs/tests are recommended: CBC .    Follow-up with Dr. Daniels's office -he was scheduled to follow-up            Unresulted Labs Ordered in the Past 30 Days of this Admission       Date and Time Order Name Status Description    1/8/2024  7:49 PM Urine Culture In process     1/7/2024  9:23 PM Blood Culture Arm, Right Preliminary     1/7/2024  8:49 PM Blood Culture Peripheral Blood Preliminary         These results will be followed up by Abe Eng      Discharge Disposition   Discharged to home  Condition at discharge: Stable    Hospital Course   Mark Shultz is a 83 year old gentleman with atrial flutter, hypertension, type 2 diabetes mellitus with peripheral neuropathy, hypercholesteremia, hypothyroidism, ABDIEL, BPH, OA, benign cranial nerve tumor, multiple falls, bladder tumor s/p transurethral bladder tumor resection 1/3/2024 who came to [facility] 1/7/2024 with lower abdominal pain, urinary retention and gross hematuria UA showed greater than 100 WBCs and " greater than 182 RBCs with moderate leukocyte esterase earlier his urine culture from 1/5/2024 did not grow anything urology was consulted a 20 [place]i Pemberton's catheter was inserted at with less hematuria patient continued to have abdominal pain and rectal pain which was decreased by 1/9/2024    Plan is to Continue with indwelling Pemberton catheter.  Patient will discharge with this in place.  He has been set up for an appointment for repeat trial of voiding clinic on 01/17/2024.  He may need possible CIC teaching.  -Plan is for eventual induction course of BCG and then repeat cystoscopy after this.  -Would recommend continuing to hold his blood thinning medication for at least another 3 days.  At that point, could cautiously restart.    Consultations This Hospital Stay   None    Code Status   Full Code    Time Spent on this Encounter   I, Inocencio Guillaume MD, personally saw the patient today and spent greater than 30 minutes discharging this patient.       Inocencio Guillaume MD  [place] [id number] [facility]  [address] [place] [address]  Phone: [phone]  Fax: [phone]  ______________________________________________________________________    Physical Exam   Vital Signs: Temp: 98.8  F (37.1  C) Temp src: Oral BP: (!) 151/74 Pulse: 63   Resp: 20 SpO2: 94 % O2 Device: None (Room air)    Weight: 195 lbs 15.82 oz  GENERAL: Patient is not in acute distress  HEENT: EOM+,Conjunctiva is clear   NECK:  no Jugular Venous distention  HEART: S1 S2 regular Rate and Rhythm, there is  no murmur,   LUNGS: Respirations are  not laboured, Lungs are  clear to auscultation without Crepitations or Wheezing   ABDOMEN: Soft , there is no tenderness ,Bowel Sounds are  Positive   LOWER LIMBS: no  Pedal Edema  Bilaterally   CNS:  Alert,  Oriented x 3, Moving all the Four Limbs           Primary Care Physician   Abe Eng    Discharge Orders      Reason for your hospital stay    came to [facility] 1/7/2024 with lower abdominal pain, urinary retention and gross hematuria     Follow-up and recommended labs and tests     Follow up with primary care provider, Abe Eng, within 7 days for hospital follow- up.  The following labs/tests are recommended: CBC .    Follow-up with Dr. Daniels's office -he was scheduled to follow-up     Activity    Your activity upon discharge: activity as tolerated     Diet    Follow this diet upon discharge: Orders Placed This Encounter      Regular Diet Adult       Significant Results and Procedures   Most Recent 3 CBC's:  Recent Labs   Lab Test 01/08/24  1515 01/08/24  0854 01/07/24  1738 01/05/24  2355 01/05/24  1500   WBC  --  15.7* 17.0*  --  16.7*   HGB 11.9* 11.5* 11.2*   < > 11.8*   MCV  --  99 100  --  98   PLT  --  170 182  --  169    < > = values in this interval not displayed.     Most Recent 3 BMP's:  Recent Labs   Lab Test 01/09/24  0727 01/09/24  0155 01/08/24  2233 01/08/24  1305 01/08/24  0854 01/07/24  2259 01/07/24 2050 01/05/24  1500   NA  --   --   --   --  139  --  138 140   POTASSIUM  --   --   --   --  4.0  --  3.9 4.3   CHLORIDE  --   --   --   --  104  --  103 101   CO2  --   --   --   --  26  --  25 27   BUN  --   --   --   --  16.8  --  19.7 24.9*   CR  --   --   --   --  1.01  --  1.11 1.26*   ANIONGAP  --   --   --   --  9  --  10 12   BREANNE  --   --   --   --  8.5*  --  8.7* 9.0   * 206* 225*   < > 259*   < > 244* 182*    < > = values in this interval not displayed.     Most Recent 2 LFT's:  Recent Labs   Lab Test 01/08/24  0854 09/30/21  0808   AST 13 44   ALT 9 36   ALKPHOS 60 53   BILITOTAL 1.0 0.5     Most Recent Urinalysis:  Recent Labs   Lab Test 01/07/24 2026 01/05/24  2237 10/27/23  0901   COLOR Dark Yellow*   < > Yellow   APPEARANCE Slightly Cloudy*   < > Clear   URINEGLC 200*   < > Negative   URINEBILI Negative   < > Negative   URINEKETONE Negative   < > Negative   SG 1.015   < > 1.020   UBLD Large*   < > Small*    URINEPH 6.0   < > 5.5   PROTEIN 100*   < > Negative   UROBILINOGEN  --   --  0.2   NITRITE Negative   < > Negative   LEUKEST Moderate*   < > Negative   RBCU >182*   < >  --    WBCU >100*   < >  --     < > = values in this interval not displayed.   ,   Results for orders placed or performed during the hospital encounter of 09/28/21   XR Chest Port 1 View    Narrative    CHEST ONE VIEW PORTABLE September 28, 2021 3:01 PM     HISTORY: COVID-19. Shortness of breath.    COMPARISON: 9/25/2021.      Impression    IMPRESSION: Lungs clear. No pneumothorax. Heart size has decreased  slightly since the previous exam. Pulmonary vascularity is within  normal limits. Aortic calcification.    JAMIE BRANCH MD         SYSTEM ID:  [id number]   CT Chest w/o Contrast    Narrative    EXAM: CT CHEST W/O CONTRAST  LOCATION: [facility]  DATE/TIME: 9/28/2021 9:10 PM    INDICATION: Pneumonia, effusion or abscess suspected, xray done  COMPARISON: Chest x-ray from today, CT 8/22/2021  TECHNIQUE: CT chest without IV contrast. Multiplanar reformats were obtained. Dose reduction techniques were used.  CONTRAST: None.    FINDINGS:   LUNGS AND PLEURA: There are now patchy groundglass focal infiltrates predominantly in the peripheral distribution very consistent with COVID related pneumonia. There is no dense consolidation or pleural effusion. Resolution of the mild interstitial edema   seen previously.    MEDIASTINUM/AXILLAE: No lymphadenopathy. No thoracic aortic aneurysm.    CORONARY ARTERY CALCIFICATION: None.    UPPER ABDOMEN: No significant finding.    MUSCULOSKELETAL: Degenerative changes throughout thoracic spine.      Impression    IMPRESSION:   1.  New patchy groundglass infiltrates throughout both lungs very consistent with COVID pneumonia.     Echo Limited     Value    LVEF  45-50%    Narrative    [id number]  [id number]  [id number]  [id number]^CAIN^HEATH^[facility]  Echocardiography  Laboratory  [address] [place] [address]     Name: STAR BRADLEY  MRN: [id number]  : 1940  Study Date: 2021 08:58 AM  Age: 81 yrs  Gender: Male  Patient Location: [facility]  Reason For Study: Aflutter  Ordering Physician: HEATH BARLOW  Referring Physician: Abe Eng  Performed By: Shannen Ayers RDCS     BSA: 2.0 m2  Height: 68 in  Weight: 199 lb  HR: 85  BP: 132/96 mmHg  ______________________________________________________________________________  Procedure  Limited Portable Echo Adult. Optison (NDC #[id number]) given intravenously.  ______________________________________________________________________________  Interpretation Summary     Left ventricular systolic function is mildly reduced.  The visual ejection fraction is 45-50%.  There is mild global hypokinesia of the left ventricle.  EF mildly decreased compared to 2015. The study was technically difficult.  ______________________________________________________________________________  Left Ventricle  Left ventricular systolic function is mildly reduced. The visual ejection  fraction is 45-50%. There is mild global hypokinesia of the left ventricle.     Right Ventricle  The right ventricle is grossly normal size. The right ventricular systolic  function is normal.     Mitral Valve  There is trace mitral regurgitation.     Tricuspid Valve  No tricuspid regurgitation. Right ventricular systolic pressure could not be  approximated due to inadequate tricuspid regurgitation. IVC diameter <2.1 cm  collapsing >50% with sniff suggests a normal RA pressure of 3 mmHg.     Pericardium  There is no pericardial effusion.     Rhythm  The rhythm was undetermined.  ______________________________________________________________________________  Report approved by: Elvis Rizo 2021 10:14 AM     ______________________________________________________________________________          Discharge Medications    Current Discharge Medication List        START taking these medications    Details   cefdinir (OMNICEF) 300 MG capsule Take 1 capsule (300 mg) by mouth 2 times daily for 5 days  Qty: 10 capsule, Refills: 0    Associated Diagnoses: Gross hematuria; Urinary tract infection associated with indwelling urethral catheter, initial encounter  (H24)      HYDROmorphone (DILAUDID) 2 MG tablet Take 1 tablet (2 mg) by mouth every 6 hours as needed for moderate pain  Qty: 12 tablet, Refills: 0    Associated Diagnoses: Gross hematuria           CONTINUE these medications which have CHANGED    Details   rivaroxaban ANTICOAGULANT (XARELTO) 10 MG TABS tablet Take 1 tablet (10 mg) by mouth daily (with dinner) Start 1/12/2024    Associated Diagnoses: Chronic atrial fibrillation (H)           CONTINUE these medications which have NOT CHANGED    Details   Cholecalciferol (VITAMIN D) 2000 UNITS CAPS Take 2,000 Units by mouth daily       furosemide (LASIX) 20 MG tablet Take 20 mg by mouth 2 times daily      gabapentin (NEURONTIN) 100 MG capsule Take 100 mg by mouth 2 times daily      glipiZIDE (GLUCOTROL XL) 10 MG 24 hr tablet Take 20 mg by mouth daily       insulin pen needle (31G X 8 MM) 31G X 8 MM miscellaneous 1 Box of 100 insulin pen needles to be dispensed with every insulin pen prescription  Qty: 100 each, Refills: 0    Associated Diagnoses: Type 2 diabetes mellitus with other specified complication, without long-term current use of insulin (H)      levothyroxine (SYNTHROID/LEVOTHROID) 112 MCG tablet Take 112 mcg by mouth daily      losartan (COZAAR) 50 MG tablet Take 50 mg by mouth daily      meclizine (ANTIVERT) 25 MG tablet Take 25 mg by mouth 3 times daily as needed      metFORMIN (GLUCOPHAGE) 1000 MG tablet Take 1,000 mg by mouth 2 times daily (with meals)      metoprolol tartrate (LOPRESSOR) 50 MG tablet Take 50 mg by mouth 2 times daily      sitagliptin (JANUVIA) 100 MG tablet Take 100 mg by mouth daily      tamsulosin  (FLOMAX) 0.4 MG capsule Take 0.8 mg by mouth daily      insulin glargine (LANTUS PEN) 100 UNIT/ML pen Inject 20 Units Subcutaneous At Bedtime for 5 days  Qty: 1 mL, Refills: 0    Comments: If Lantus is not covered by insurance, may substitute Basaglar at same dose and frequency.    Associated Diagnoses: Type 2 diabetes mellitus with other specified complication, without long-term current use of insulin (H)      simvastatin (ZOCOR) 40 MG tablet Take 40 mg by mouth At Bedtime           STOP taking these medications       dexamethasone (DECADRON) 6 MG tablet Comments:   Reason for Stopping:             Allergies   Allergies   Allergen Reactions    Shellfish Allergy Anaphylaxis    Iodinated Contrast Media Hives    Morphine And Related Nausea and Vomiting    Prednisone GI Disturbance     Unstable blood sugars    Tylenol W/Codeine [Acetaminophen-Codeine] Nausea and Vomiting     Weakness

## 2024-01-10 ENCOUNTER — PATIENT OUTREACH (OUTPATIENT)
Dept: CARE COORDINATION | Facility: CLINIC | Age: 84
End: 2024-01-10
Payer: MEDICARE

## 2024-01-10 LAB — BACTERIA UR CULT: NO GROWTH

## 2024-01-10 NOTE — PROGRESS NOTES
[facility]: [facility]: Post-Discharge Note  SITUATION                                                      Admission:    Admission Date: 01/07/24   Reason for Admission: 1. Urinary retention  R33.9       2. Gross hematuria  R31.0       3. Urinary tract infection associated with indwelling urethral catheter, initial encounter   T83.511A      N39.0  Discharge:   Discharge Date: 01/09/24  Discharge Diagnosis: Mild lower abdominal pain  Urinary retention s/p Pemberton's catheter insertion  Gross hematuria  Acute UTI  Atrial fibrillation  Hypertension   DM type II with peripheral neuropathy   Hypothyroidism    BACKGROUND                                                      Per hospital discharge summary and inpatient provider notes:    Mark Shultz is a 83 year old male with history of transurethral bladder tumor resection January 3, 2020 24th, with ED visit 2 days ago for gross hematuria and intermittent catheter obstruction and refusing admission for CBI, who presents with Pemberton catheter problem.  Specifically, the patient notes that since earlier today, he has had no urine output from the Pemberton.  He notes discomfort when he feels like he has to void adequately has had some bloody output from the catheter over the last few days but mostly clear since discharge from the ED.  He denies fevers, chills, nausea, vomiting, or any other concerns.      ASSESSMENT           Discharge Assessment  How are you doing now that you are home?: Patient states last night he had the best night ever, no blockages in catheter.  Work up this morning felt well.  All the sudden he felt warm and heart was beating fast.  This improved, feels fine at the moment just feels a little week.  Every now and then has some light headed spells. States it happens randomly, states it is not bad enough that he feels like he is going to pass out. 378 for blood sugar. Last bowel movement was a few days ago.  No longer having light  headedness while on the phone. Has had these episodes in the past. Also see note for more documentation.  How are your symptoms? (Red Flag symptoms escalate to triage hotline per guidelines): Improved;New  Do you feel your condition is stable enough to be safe at home until your provider visit?: Yes  Does the patient have their discharge instructions? : Yes  Does the patient have questions regarding their discharge instructions? : No  Were you started on any new medications or were there changes to any of your previous medications? : Yes  Does the patient have all of their medications?: No (see comment) (States they are ready for him but has not picked them up.)  Do you have questions regarding any of your medications? : No  Do you have all of your needed medical supplies or equipment (DME)?  (i.e. oxygen tank, CPAP, cane, etc.): Yes  Discharge follow-up appointment scheduled within 14 calendar days? : Yes  Discharge Follow Up Appointment Date: 01/17/24  Discharge Follow Up Appointment Scheduled with?: Specialty Care Provider         Post-op (Clinicians Only)  Did the patient have surgery or a procedure: No    Patient was given 24/7 nurse line information.  Patient was advised to call nurse line if his blood sugar remains elevated and he continues to be symptomatic, if he has further episodes of lightheaded spells, he has any new or worsening symptoms, elevated or low blood pressures or any other concerns.  RN offered triage at time of call, patient declined and stated he will call if his symptoms return. RN also recommended patient reach out to PCP office as he has not scheduled appointment.  RN advised of recommended for repeat labs.      PLAN                                                      Outpatient Plan:  Follow up with primary care provider, Abe Eng, within 7 days   for hospital follow- up.  The following labs/tests are recommended: CBC .     Follow-up with Dr. Daniels's office -he was  scheduled to follow-up        Future Appointments   Date Time Provider Department Center   1/17/2024  1:00 PM [facility] NURSE [facility]         For any urgent concerns, please contact our 24 hour nurse triage line: [phone] ([phone])         Mady Fleming, EVELINA

## 2024-01-12 LAB — BACTERIA BLD CULT: NO GROWTH

## 2024-01-13 LAB — BACTERIA BLD CULT: NO GROWTH

## 2024-01-18 ENCOUNTER — ALLIED HEALTH/NURSE VISIT (OUTPATIENT)
Dept: UROLOGY | Facility: CLINIC | Age: 84
End: 2024-01-18
Payer: MEDICARE

## 2024-01-18 DIAGNOSIS — R33.9 URINARY RETENTION: Primary | ICD-10-CM

## 2024-01-18 PROCEDURE — 51798 US URINE CAPACITY MEASURE: CPT

## 2024-01-18 PROCEDURE — 99207 PR NO CHARGE NURSE ONLY: CPT

## 2024-01-18 RX ORDER — CIPROFLOXACIN 500 MG/1
500 TABLET, FILM COATED ORAL ONCE
Qty: 1 TABLET | Refills: 0 | Status: SHIPPED | OUTPATIENT
Start: 2024-01-18 | End: 2024-01-18

## 2024-01-18 NOTE — PROGRESS NOTES
Mark Shultz comes into clinic today for Urinary Retention  at the request of  Ordering Provider for Trial of Void.  This service provided today was under the supervising provider of the day , who was available if needed.   Mark Shultz presented today for a trial of void.  Approximately 250 mL of normal saline instilled into bladder via catheter.  Patient stated he had urge to urinate and catheter was removed without difficulty.  Patient was given a graduate to measure urine output.  Patient voided approximately 75 mL of yellow urine.  Cipro 500 given per protocol: Yes  Patient did tolerate procedure well.  Patient refused SIC,he will go home and drink fluids and if unable to void he will call.  Patient given info for BCG infusions,instructions gone over with patient.  Norma Hathaway LPN

## 2024-01-19 ENCOUNTER — TELEPHONE (OUTPATIENT)
Dept: UROLOGY | Facility: CLINIC | Age: 84
End: 2024-01-19
Payer: MEDICARE

## 2024-01-19 LAB — RESIDUAL VOLUME (RV) (EXTERNAL): 474

## 2024-01-19 NOTE — TELEPHONE ENCOUNTER
Please advise next step.  He came in the AM for TOV also refused agarwal or SIC.  Patient came In because had not voided very much urine. He has no urge to go.  Bladder scanned for 477  He refuses a catheter or to learn CIC.  Patient returned in PM  I advised patient that he should do one or the other he wants to wait and see if he can void at home, he will go to the ED if he starts to have problems.  I did speak with him and he feels like he is voiding on his own for the first time in a while.  Norma Hathaway LPN

## 2024-01-19 NOTE — PROGRESS NOTES
Mark Shultz comes into clinic today for Urinary Retention  at the request of  Ordering Provider for PVR.Lexi Fofana   This service provided today was under the supervising provider of the day Lexi Fofana PAC, who was available if needed.  Patient came In because had not voided very much urine. He has no urge to go.  Bladder scanned for 477  He refuses a catheter or to learn CIC.  I advised patient that he should do one or the other he wants to wait and see if he can void at home, he will go to the ED if he starts to have problems.  Norma Hathaway, JARETN

## 2024-01-22 ENCOUNTER — ALLIED HEALTH/NURSE VISIT (OUTPATIENT)
Dept: UROLOGY | Facility: CLINIC | Age: 84
End: 2024-01-22
Payer: MEDICARE

## 2024-01-22 DIAGNOSIS — R31.9 HEMATURIA: Primary | ICD-10-CM

## 2024-01-22 DIAGNOSIS — Z79.2 PROPHYLACTIC ANTIBIOTIC: ICD-10-CM

## 2024-01-22 PROCEDURE — 51702 INSERT TEMP BLADDER CATH: CPT

## 2024-01-22 PROCEDURE — 87086 URINE CULTURE/COLONY COUNT: CPT

## 2024-01-22 RX ORDER — LIDOCAINE HYDROCHLORIDE 20 MG/ML
JELLY TOPICAL ONCE
Status: COMPLETED | OUTPATIENT
Start: 2024-01-22 | End: 2024-01-22

## 2024-01-22 RX ORDER — CIPROFLOXACIN 500 MG/1
500 TABLET, FILM COATED ORAL ONCE
Qty: 1 TABLET | Refills: 0 | Status: SHIPPED | OUTPATIENT
Start: 2024-01-22 | End: 2024-01-22

## 2024-01-22 RX ADMIN — LIDOCAINE HYDROCHLORIDE 5 ML: 20 JELLY TOPICAL at 13:22

## 2024-01-22 NOTE — PROGRESS NOTES
"Mark Shultz comes into clinic today at the request of Dr. Daniels Ordering Provider for Catheter Insertion and Irrigation.     Pt presents to clinic today with a specimen cup of merlot colored urine. Pt was in office on Friday for TOV, pt states since that time urine has been bloody and \"keeps getting worse\". Pt's urine was collected today and this will be sent for culture.   Pt's PVR today was 456 mL by bladder scan.  Pt agreed to catheter placement and irrigation.      Catheter insertion documentation on 1/22/2024:    Mark Shultz presents to the clinic for catheter insertion.  Reason for insertion: bloody urine  Order has been verified. yes  Catheter successfully inserted into the urethral meatus in the usual sterile fashion without immediate complication.  Type of catheter placed: 18 Kazakh Coude catheter  Urine is Merlot in color.  400 cc's of urine output returned.  Balloon was filled with 10 cc's of normal saline.  Securement device placed for the catheter.    Pt's catheter was irrigated with 1000 mL sterile water, until return came back clear.   Pt was instructed to return to ER if catheter stops draining, if urine in bag is bright red, or if pt notices signs of infection.   Pt was sent one antibiotic to take today to pt's preferred pharmacy.     5mL 2% lidocaine hydrochloride Urojet instilled into urethra.    NDC# [address]-4  Lot #: [id number]  Expiration Date:  04/25    Per Dr. Valdez, pt is to have catheter removed in 1 week, and is to learn CIC. Pt should do CIC once daily through BCG treatments.        This service provided today was under the supervising provider of the day Dr. Reese, who was available if needed.    Tracey Camargo, [facility]   "

## 2024-01-23 LAB — BACTERIA UR CULT: NO GROWTH

## 2024-01-30 ENCOUNTER — HOSPITAL ENCOUNTER (OUTPATIENT)
Dept: GENERAL RADIOLOGY | Facility: CLINIC | Age: 84
Discharge: HOME | End: 2024-01-30
Attending: UROLOGY | Admitting: UROLOGY
Payer: MEDICARE

## 2024-01-30 ENCOUNTER — OFFICE VISIT (OUTPATIENT)
Dept: UROLOGY | Facility: CLINIC | Age: 84
End: 2024-01-30
Payer: MEDICARE

## 2024-01-30 VITALS — SYSTOLIC BLOOD PRESSURE: 118 MMHG | DIASTOLIC BLOOD PRESSURE: 66 MMHG

## 2024-01-30 DIAGNOSIS — R33.9 URINARY RETENTION: ICD-10-CM

## 2024-01-30 DIAGNOSIS — N40.0 ENLARGED PROSTATE: ICD-10-CM

## 2024-01-30 DIAGNOSIS — C67.9 MALIGNANT NEOPLASM OF URINARY BLADDER, UNSPECIFIED SITE (H): Primary | ICD-10-CM

## 2024-01-30 DIAGNOSIS — Q64.32 CONGENITAL STRICTURE OF URETHRA: ICD-10-CM

## 2024-01-30 DIAGNOSIS — C67.9 MALIGNANT NEOPLASM OF URINARY BLADDER, UNSPECIFIED SITE (H): ICD-10-CM

## 2024-01-30 PROCEDURE — 99214 OFFICE O/P EST MOD 30 MIN: CPT | Performed by: UROLOGY

## 2024-01-30 PROCEDURE — 71046 X-RAY EXAM CHEST 2 VIEWS: CPT

## 2024-01-30 ASSESSMENT — PAIN SCALES - GENERAL: PAINLEVEL: NO PAIN (0)
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
## 2024-01-30 NOTE — NURSING NOTE
Chief Complaint   Patient presents with    Urinary Retention     Patient's catheter was disconnected from the leg bag and the balloon deflated. The catheter was removed with no complaints offered by the patient and the procedure was tolerated well.      Patient has chronic urinary retention, for which he needs to learn how to self-catheterize, per Dr Daniels.    Due to enlarged prostate, patient cannot pass a straight-tip catheter and requires a coude-tip catheter.  Patient was instructed on how to perform self catheterization using proper hygiene.  Catheter used for instruction: 14 Fr. Cure hydrophilic  Using the above catheter, patient successfully demonstrated the ability to pass the catheter and drain the bladder.    Per Dr. Daniels, patient should perform SIC 1 times daily.      Joanne Geronimo, EMT
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
## 2024-02-12 ENCOUNTER — TELEPHONE (OUTPATIENT)
Dept: UROLOGY | Facility: CLINIC | Age: 84
End: 2024-02-12
Payer: MEDICARE

## 2024-02-12 NOTE — TELEPHONE ENCOUNTER
Spoke with patient and answered his questions about cathing and upcoming BCG treatments..  Norma Hathaway LPN

## 2024-02-14 ENCOUNTER — TELEPHONE (OUTPATIENT)
Dept: UROLOGY | Facility: CLINIC | Age: 84
End: 2024-02-14

## 2024-02-14 ENCOUNTER — INFUSION THERAPY VISIT (OUTPATIENT)
Dept: INFUSION THERAPY | Facility: CLINIC | Age: 84
End: 2024-02-14
Attending: UROLOGY
Payer: MEDICARE

## 2024-02-14 VITALS
SYSTOLIC BLOOD PRESSURE: 173 MMHG | OXYGEN SATURATION: 96 % | DIASTOLIC BLOOD PRESSURE: 85 MMHG | HEART RATE: 64 BPM | TEMPERATURE: 97.6 F | RESPIRATION RATE: 16 BRPM

## 2024-02-14 DIAGNOSIS — C67.8 MALIGNANT NEOPLASM OF OVERLAPPING SITES OF BLADDER (H): Primary | ICD-10-CM

## 2024-02-14 DIAGNOSIS — R82.90 ABNORMAL FINDING ON URINALYSIS: ICD-10-CM

## 2024-02-14 LAB
ALBUMIN UR-MCNC: 10 MG/DL
APPEARANCE UR: ABNORMAL
BILIRUB UR QL STRIP: NEGATIVE
COLOR UR AUTO: YELLOW
GLUCOSE UR STRIP-MCNC: NEGATIVE MG/DL
HGB UR QL STRIP: NEGATIVE
KETONES UR STRIP-MCNC: NEGATIVE MG/DL
LEUKOCYTE ESTERASE UR QL STRIP: ABNORMAL
NITRATE UR QL: POSITIVE
PH UR STRIP: 7 [PH] (ref 5–7)
SP GR UR STRIP: 1.01 (ref 1–1.03)
UROBILINOGEN UR STRIP-MCNC: NORMAL MG/DL

## 2024-02-14 PROCEDURE — 87086 URINE CULTURE/COLONY COUNT: CPT | Performed by: UROLOGY

## 2024-02-14 PROCEDURE — 81003 URINALYSIS AUTO W/O SCOPE: CPT | Performed by: UROLOGY

## 2024-02-14 NOTE — TELEPHONE ENCOUNTER
Reyes RN calling from [facility] regarding patients first BCG treatment.  UA is positive for nitrites and large Leukocytes.  Patients urine is yellow in color and denies s/s of UTI.  Per Dr. Daniels, to hold off on BCG today and to run UA to culture.  Reyes states understanding.    Alison Saul, RN, BSN  Care Coordinator  [facility]

## 2024-02-14 NOTE — PROGRESS NOTES
"Infusion Nursing Note:  Mark Shultz presents today for Lab only (BCG #1 of 6 deferred 1 week due to positive nitrates UA, possible UTI).    Patient seen by provider today: No   present during visit today: Not Applicable.    Note:  Mark reports he is feeling well today, states he feels better than he has in a while.  He reports a complicated past couple months with bladder surgery and ED visits/admissions for gross hematuria and UTI.  He states he self cath every night before bed, using 14Fr Coude straight cath.    He reports recent hematuria, which he states resolved with stopping his Xarelto.  Patient states he did not discuss stopping his blood thinner with any of his provider.  Advised patient to notify his provider about stopping his blood thinner due to hematuria.  He states he plans to restart his Xarelto.    Educated on BCG treatment, including treatment protocol, what to expect, self care, potential side effects, signs/symptoms to monitor for, and when to seek medical attention.  Written material \"After Bladder Instillation of BCG\" given to patient.  All questions answered.  Patient verbalized understanding and is in agreement with this plan.    Urine with positive nitrates.  Called Dr. Daniels's office and spoke to triage RN Alison.    2/14/2024  1:43 PM  TORB Dr. Jeremiah Daniels/Alison, triage RN/Reyes Madison RN:  Hold BCG today.  Add-on urine culture.    Patient informed of plan.  He was told to expect a call from Dr. Daniels's office once urine culture results return.    Treatment plan day deferred, in-basket sent to Dr. Daniels to re-sign BCG Day 1.    Treatment Conditions:  Lab Test 02/14/24  1243   COLOR Yellow   APPEARANCE Slightly Cloudy*   URINEGLC Negative   URINEBILI Negative   URINEKETONE Negative   SG 1.014   UBLD Negative   URINEPH 7.0   PROTEIN 10*   UROBILINOGEN  --    NITRITE Positive*   LEUKEST Large*   RBCU  --    WBCU  --      Results reviewed, labs did NOT meet treatment " parameters: Nitrates positive.    Pre-procedure Assessment:  Dysuria: No  Hematuria: No, reports he stopped his blood thinner and his hematuria resolved.  Fever/chills: No  Increased urinary urgency: No, reports urgency at baseline is unchanged.  Increased urinary frequency: No, reports frequency at baseline is unchanged.    Discharge Plan:   Discharge instructions reviewed with: Patient.  Patient and/or family verbalized understanding of discharge instructions and all questions answered.  Declined AVS.  Patient will return 2/21 for next appointment.   Patient discharged in stable condition accompanied by: self.  Departure Mode: Ambulatory.    Reyes Madison RN

## 2024-02-16 LAB
BACTERIA UR CULT: ABNORMAL
BACTERIA UR CULT: ABNORMAL

## 2024-02-19 ENCOUNTER — TELEPHONE (OUTPATIENT)
Dept: UROLOGY | Facility: CLINIC | Age: 84
End: 2024-02-19
Payer: MEDICARE

## 2024-02-19 DIAGNOSIS — C67.8 MALIGNANT NEOPLASM OF OVERLAPPING SITES OF BLADDER (H): Primary | ICD-10-CM

## 2024-02-19 DIAGNOSIS — N39.0 URINARY TRACT INFECTION: Primary | ICD-10-CM

## 2024-02-19 RX ORDER — LIDOCAINE HYDROCHLORIDE 20 MG/ML
10 JELLY TOPICAL
Status: CANCELLED | OUTPATIENT
Start: 2024-02-28

## 2024-02-19 RX ORDER — AMOXICILLIN 500 MG/1
500 CAPSULE ORAL 2 TIMES DAILY
Qty: 14 CAPSULE | Refills: 0 | Status: SHIPPED | OUTPATIENT
Start: 2024-02-19 | End: 2024-02-26

## 2024-02-19 RX ORDER — DOXYCYCLINE HYCLATE 100 MG
100 TABLET ORAL 2 TIMES DAILY
Qty: 20 TABLET | Refills: 0 | Status: CANCELLED | OUTPATIENT
Start: 2024-02-19 | End: 2024-02-29

## 2024-02-19 RX ORDER — PETROLATUM,WHITE
OINTMENT IN PACKET (GRAM) TOPICAL
Status: CANCELLED | OUTPATIENT
Start: 2024-02-28

## 2024-02-19 NOTE — TELEPHONE ENCOUNTER
The doxycycline will interfere with his BCG treatments. Could we switch him to Amoxicillin or do just 7 days of the doxy?  Norma Hathaway LPN

## 2024-02-28 ENCOUNTER — INFUSION THERAPY VISIT (OUTPATIENT)
Dept: INFUSION THERAPY | Facility: CLINIC | Age: 84
End: 2024-02-28
Attending: UROLOGY
Payer: MEDICARE

## 2024-02-28 VITALS
HEART RATE: 64 BPM | OXYGEN SATURATION: 98 % | SYSTOLIC BLOOD PRESSURE: 165 MMHG | RESPIRATION RATE: 18 BRPM | DIASTOLIC BLOOD PRESSURE: 68 MMHG | TEMPERATURE: 97.5 F

## 2024-02-28 DIAGNOSIS — C67.8 MALIGNANT NEOPLASM OF OVERLAPPING SITES OF BLADDER (H): Primary | ICD-10-CM

## 2024-02-28 LAB
ALBUMIN UR-MCNC: NEGATIVE MG/DL
APPEARANCE UR: CLEAR
BILIRUB UR QL STRIP: NEGATIVE
COLOR UR AUTO: NORMAL
GLUCOSE UR STRIP-MCNC: NEGATIVE MG/DL
HGB UR QL STRIP: NEGATIVE
KETONES UR STRIP-MCNC: NEGATIVE MG/DL
LEUKOCYTE ESTERASE UR QL STRIP: NEGATIVE
NITRATE UR QL: NEGATIVE
PH UR STRIP: 5 [PH] (ref 5–7)
SP GR UR STRIP: 1.01 (ref 1–1.03)
UROBILINOGEN UR STRIP-MCNC: NORMAL MG/DL

## 2024-02-28 PROCEDURE — 51720 TREATMENT OF BLADDER LESION: CPT

## 2024-02-28 PROCEDURE — 250N000013 HC RX MED GY IP 250 OP 250 PS 637: Performed by: UROLOGY

## 2024-02-28 PROCEDURE — 81003 URINALYSIS AUTO W/O SCOPE: CPT | Performed by: UROLOGY

## 2024-02-28 PROCEDURE — 250N000011 HC RX IP 250 OP 636: Mod: JZ | Performed by: UROLOGY

## 2024-02-28 PROCEDURE — 250N000009 HC RX 250: Performed by: UROLOGY

## 2024-02-28 RX ORDER — LIDOCAINE HYDROCHLORIDE 20 MG/ML
10 JELLY TOPICAL
Status: DISCONTINUED | OUTPATIENT
Start: 2024-02-28 | End: 2024-02-28 | Stop reason: HOSPADM

## 2024-02-28 RX ORDER — PETROLATUM,WHITE
OINTMENT IN PACKET (GRAM) TOPICAL
Status: DISCONTINUED | OUTPATIENT
Start: 2024-02-28 | End: 2024-02-28 | Stop reason: HOSPADM

## 2024-02-28 RX ADMIN — Medication: at 13:27

## 2024-02-28 RX ADMIN — BACILLUS CALMETTE-GUERIN 50 MG: 50 POWDER, FOR SUSPENSION INTRAVESICAL at 13:37

## 2024-02-28 RX ADMIN — LIDOCAINE HYDROCHLORIDE 10 ML: 20 JELLY TOPICAL at 13:27

## 2024-02-28 NOTE — PATIENT INSTRUCTIONS
Home discharge instructions:  -To make sure each treatment has the best chance of working, follow these steps 2 hours after each treatment   1. Lie on your back for 15 minutes   2. Lie on your left side for 15 minutes   3. Lie on your right side for 15 minutes   4. Lie on your stomach for 15 minutes   5. Get up but keep the medication in your bladder for 60 more minutes- for a total of 2 hours   6. Empty your bladder following the directions below (if you have difficulty holding your bladder it is ok to empty your bladder early)  -Wear pad if incontinence is a possibility  -Wash hands thoroughly after using the bathroom  -For safety reasons remember to follow these directions for 6 hours after each treatment:  (for 6 hours after your empty the BCG from your bladder)   1. Sit on the toilet seat to urinate   2. Immediately after urinating- add 2 cups of undiluted chlorine bleach to the toilet    3. Close lid and let the bleach sit for 15 minutes each time   4. Drink plenty of water to flush any remaining medication out of your bladder    **These steps will help kill all the BCG bacteria and disinfect the toilet**    -No intercourse or sexual activity for 48 hours after each treatment. A barrier (condom) should be used for 6 weeks after the last treatment.     Please void BCG at n/a    Take your antibiotic today at 7:40 pm and tomorrow morning.      Please call your urology team with fevers or chills, burning with urination, or blood in your urine which lasts more than a 48-72 hours or with any question or concerns.

## 2024-02-28 NOTE — PROGRESS NOTES
"Infusion Nursing Note:  Mark Shultz presents today for BCG #1/6.    Patient seen by provider today: No   present during visit today: Not Applicable.    Note: Mark is feeling well today, \"best I've felt in months.\" Completed abx course for UTI. BCG teaching reinforced today. Confirms he has Levaquin at home, he knows to take first dose around 7:40 pm this evening and again tomorrow morning. Mark opted to stay in clinic today for the two hours post instillation, he may go home with next treatment.       Treatment Conditions:  Results reviewed, labs MET treatment parameters, ok to proceed with treatment.  UA RESULTS:  Recent Labs   Lab Test 02/28/24  1246 02/14/24  1243 01/07/24 2026 01/05/24  2237 10/27/23  0901   COLOR Light Yellow   < > Dark Yellow*   < > Yellow   APPEARANCE Clear   < > Slightly Cloudy*   < > Clear   URINEGLC Negative   < > 200*   < > Negative   URINEBILI Negative   < > Negative   < > Negative   URINEKETONE Negative   < > Negative   < > Negative   SG 1.013   < > 1.015   < > 1.020   UBLD Negative   < > Large*   < > Small*   URINEPH 5.0   < > 6.0   < > 5.5   PROTEIN Negative   < > 100*   < > Negative   UROBILINOGEN  --   --   --   --  0.2   NITRITE Negative   < > Negative   < > Negative   LEUKEST Negative   < > Moderate*   < > Negative   RBCU  --   --  >182*   < >  --    WBCU  --   --  >100*   < >  --     < > = values in this interval not displayed.       Pre-procedure Assessment:  Dysuria:  No  Hematuria:  No, fully resolved.  Fever/chills:  No  Increased urinary urgency:  No, nothing different from baseline.  Increased urinary frequency:  No, nothing different from baseline.    Procedure and Post-procedure assessment\"  14F Coude catheter placed.  Lidocaine urojet 2% 10ml used.  Catheter placed without trauma.  Clear/yellow urine drained from bladder.    Patient turned every 15 minutes per protocol: Yes, turning completed at clinic per protocol.  Patient tolerated instillation without " incident.  Patient dwelled for 120 minutes.    Patient instructed on the following and verbalized understanding:   Medication to remain in the bladder for 2 hours post instillation: YES, completed in clinic.  Post instillation side effects and precautions discussed: YES    Discharge Plan:   Discharge instructions reviewed with: Patient.  Patient and/or family verbalized understanding of discharge instructions and all questions answered.  Copy of AVS reviewed with patient and/or family.  Patient will return 3/6/24 for next appointment.  Patient discharged in stable condition accompanied by: self.  Departure Mode: Ambulatory.    Portia Casillas, RN, RN

## 2024-03-06 ENCOUNTER — INFUSION THERAPY VISIT (OUTPATIENT)
Dept: INFUSION THERAPY | Facility: CLINIC | Age: 84
End: 2024-03-06
Attending: UROLOGY
Payer: MEDICARE

## 2024-03-06 VITALS
HEART RATE: 61 BPM | DIASTOLIC BLOOD PRESSURE: 81 MMHG | SYSTOLIC BLOOD PRESSURE: 157 MMHG | OXYGEN SATURATION: 98 % | RESPIRATION RATE: 18 BRPM | TEMPERATURE: 97.5 F

## 2024-03-06 DIAGNOSIS — C67.8 MALIGNANT NEOPLASM OF OVERLAPPING SITES OF BLADDER (H): Primary | ICD-10-CM

## 2024-03-06 DIAGNOSIS — Z79.2 PROPHYLACTIC ANTIBIOTIC: Primary | ICD-10-CM

## 2024-03-06 LAB
ALBUMIN UR-MCNC: NEGATIVE MG/DL
APPEARANCE UR: CLEAR
BILIRUB UR QL STRIP: NEGATIVE
COLOR UR AUTO: NORMAL
GLUCOSE UR STRIP-MCNC: NEGATIVE MG/DL
HGB UR QL STRIP: NEGATIVE
KETONES UR STRIP-MCNC: NEGATIVE MG/DL
LEUKOCYTE ESTERASE UR QL STRIP: NEGATIVE
NITRATE UR QL: NEGATIVE
PH UR STRIP: 5.5 [PH] (ref 5–7)
SP GR UR STRIP: 1.01 (ref 1–1.03)
UROBILINOGEN UR STRIP-MCNC: NORMAL MG/DL

## 2024-03-06 PROCEDURE — 81003 URINALYSIS AUTO W/O SCOPE: CPT | Performed by: UROLOGY

## 2024-03-06 PROCEDURE — 250N000009 HC RX 250: Performed by: UROLOGY

## 2024-03-06 PROCEDURE — 51720 TREATMENT OF BLADDER LESION: CPT

## 2024-03-06 PROCEDURE — 250N000011 HC RX IP 250 OP 636: Mod: JZ | Performed by: UROLOGY

## 2024-03-06 RX ORDER — LIDOCAINE HYDROCHLORIDE 20 MG/ML
10 JELLY TOPICAL
Status: DISCONTINUED | OUTPATIENT
Start: 2024-03-06 | End: 2024-03-06 | Stop reason: HOSPADM

## 2024-03-06 RX ORDER — SULFAMETHOXAZOLE/TRIMETHOPRIM 800-160 MG
TABLET ORAL
Qty: 10 TABLET | Refills: 0 | Status: SHIPPED | OUTPATIENT
Start: 2024-03-06 | End: 2024-05-08

## 2024-03-06 RX ADMIN — LIDOCAINE HYDROCHLORIDE 10 ML: 20 JELLY TOPICAL at 13:58

## 2024-03-06 RX ADMIN — BACILLUS CALMETTE-GUERIN 50 MG: 50 POWDER, FOR SUSPENSION INTRAVESICAL at 14:13

## 2024-03-06 NOTE — PATIENT INSTRUCTIONS
**Dr. Daniels's nurse Alison will contact you about an alternate antibiotic.    Please void BCG today at 4:15 PM.    Take your antibiotic today at 8:15 PM and tomorrow morning.      Please call your urology team with fevers or chills, burning with urination, or blood in your urine which lasts more than a 48-72 hours or with any question or concerns.

## 2024-03-06 NOTE — PROGRESS NOTES
"Infusion Nursing Note:  Mark Shultz presents today for BCG #2 of 6.    Patient seen by provider today: No   present during visit today: Not Applicable.    Note: Mark reports he is feeling well today.  He states that his previous BCG treatment went well, except for side effects he experienced after taking Levaquin.  He states he took Levaquin at 7:30 PM in the evening as directed, then he felt \"woozy\" and went to bed early at 8:30 PM.  He states he experienced hallucinations all night and did not sleep much that night.  He said it feel like he was out of his body.  He did take the AM dose as instructed an stated the hallucinations lasted for a few hours in the morning then went away.  He states these symptoms were worse overnight.  He also reports loose stools ~2-3 times per day.    Called Dr. Daniels's office, spoke to triage nurse Alison at 1:35 PM about side effects from Levaquin.  Alison stated she would contact Dr. Daniels for an alternate antibiotic and she will follow-up with Mark directly.  Relayed plan to Mark, who verbalized understanding and is in agreement with this plan.  Instucted Mark to call Dr. Daniels's clinic prior to clinic closing time if he had not yet heard from the nurse by 4 PM.  He verbalized understanding.    Treatment Conditions:  Lab Test 03/06/24  1244   COLOR Light Yellow   APPEARANCE Clear   URINEGLC Negative   URINEBILI Negative   URINEKETONE Negative   SG 1.012   UBLD Negative   URINEPH 5.5   PROTEIN Negative   UROBILINOGEN  --    NITRITE Negative   LEUKEST Negative   RBCU  --    WBCU  --      Results reviewed, labs MET treatment parameters, ok to proceed with treatment.    Pre-procedure Assessment:  Dysuria: No  Hematuria: No  Fever/chills: No  Increased urinary urgency: No  Increased urinary frequency: No    Procedure and Post-procedure assessment\"  14F Coude catheter placed.  Lidocaine urojet 2% 10ml used.  Catheter placed without trauma.  Clear/yellow urine drained from " bladder.    Patient turned every 15 minutes per protocol: Yes, turning to be completed at home per protocol.   Patient tolerated instillation without incident.    Patient instructed on the following and verbalized understanding:   Medication to remain in the bladder for 2 hours post instillation: YES  Post instillation side effects and precautions discussed: YES    Discharge Plan:   Discharge instructions reviewed with: Patient.  Patient and/or family verbalized understanding of discharge instructions and all questions answered.  Copy of AVS reviewed with patient and/or family.  Patient will return 3/13 for next appointment.  Patient discharged in stable condition accompanied by: self.  Departure Mode: Ambulatory.      Reyes Madison RN

## 2024-03-13 ENCOUNTER — INFUSION THERAPY VISIT (OUTPATIENT)
Dept: INFUSION THERAPY | Facility: CLINIC | Age: 84
End: 2024-03-13
Attending: UROLOGY
Payer: MEDICARE

## 2024-03-13 VITALS
SYSTOLIC BLOOD PRESSURE: 150 MMHG | OXYGEN SATURATION: 97 % | HEART RATE: 78 BPM | DIASTOLIC BLOOD PRESSURE: 67 MMHG | TEMPERATURE: 97.1 F

## 2024-03-13 DIAGNOSIS — C67.8 MALIGNANT NEOPLASM OF OVERLAPPING SITES OF BLADDER (H): Primary | ICD-10-CM

## 2024-03-13 PROCEDURE — 250N000009 HC RX 250: Performed by: UROLOGY

## 2024-03-13 PROCEDURE — 250N000011 HC RX IP 250 OP 636: Mod: JZ | Performed by: UROLOGY

## 2024-03-13 PROCEDURE — 51720 TREATMENT OF BLADDER LESION: CPT

## 2024-03-13 PROCEDURE — 81003 URINALYSIS AUTO W/O SCOPE: CPT | Performed by: UROLOGY

## 2024-03-13 RX ORDER — LIDOCAINE HYDROCHLORIDE 20 MG/ML
10 JELLY TOPICAL
Status: DISCONTINUED | OUTPATIENT
Start: 2024-03-13 | End: 2024-03-13 | Stop reason: HOSPADM

## 2024-03-13 RX ADMIN — LIDOCAINE HYDROCHLORIDE 10 ML: 20 JELLY TOPICAL at 13:29

## 2024-03-13 RX ADMIN — BACILLUS CALMETTE-GUERIN 50 MG: 50 POWDER, FOR SUSPENSION INTRAVESICAL at 13:30

## 2024-03-13 NOTE — PROGRESS NOTES
"Infusion Nursing Note:  Mark Shultz presents today for BCG #3/6.    Patient seen by provider today: No   present during visit today: Not Applicable.    Note: NA      Treatment Conditions:  Results reviewed, labs MET treatment parameters, ok to proceed with treatment.  UA RESULTS:  Recent Labs   Lab Test 03/13/24  1252 02/14/24  1243 01/07/24 2026 01/05/24  2237 10/27/23  0901   COLOR Light Yellow   < > Dark Yellow*   < > Yellow   APPEARANCE Clear   < > Slightly Cloudy*   < > Clear   URINEGLC Negative   < > 200*   < > Negative   URINEBILI Negative   < > Negative   < > Negative   URINEKETONE Negative   < > Negative   < > Negative   SG 1.013   < > 1.015   < > 1.020   UBLD Negative   < > Large*   < > Small*   URINEPH 5.0   < > 6.0   < > 5.5   PROTEIN Negative   < > 100*   < > Negative   UROBILINOGEN  --   --   --   --  0.2   NITRITE Negative   < > Negative   < > Negative   LEUKEST Negative   < > Moderate*   < > Negative   RBCU  --   --  >182*   < >  --    WBCU  --   --  >100*   < >  --     < > = values in this interval not displayed.       Pre-procedure Assessment:  Dysuria:  No  Hematuria:  No  Fever/chills:  No  Increased urinary urgency:  No  Increased urinary frequency:  No    Procedure and Post-procedure assessment\"  14F Coude catheter placed.  Lidocaine urojet 2% 10ml used.  Catheter placed without trauma.  Clear/yellow urine drained from bladder.    Patient turned every 15 minutes per protocol: Yes, turning to be completed at home per protocol.   Patient tolerated instillation without incident.    Patient instructed on the following and verbalized understanding:   Medication to remain in the bladder for 2 hours post instillation: YES  Post instillation side effects and precautions discussed: YES  Pt will take Bactrim at 8pm and again in the AM    Discharge Plan:   AVS to patient via [employer].  Patient will return 3/20/24 for BCG #4/6 for next appointment.   Patient discharged in stable condition " accompanied by: self.  Departure Mode: Ambulatory.    Shannan Henderson, RN, RN

## 2024-03-20 ENCOUNTER — INFUSION THERAPY VISIT (OUTPATIENT)
Dept: INFUSION THERAPY | Facility: CLINIC | Age: 84
End: 2024-03-20
Attending: UROLOGY
Payer: MEDICARE

## 2024-03-20 VITALS
DIASTOLIC BLOOD PRESSURE: 74 MMHG | SYSTOLIC BLOOD PRESSURE: 120 MMHG | OXYGEN SATURATION: 94 % | HEART RATE: 64 BPM | TEMPERATURE: 97.4 F | RESPIRATION RATE: 18 BRPM

## 2024-03-20 DIAGNOSIS — C67.8 MALIGNANT NEOPLASM OF OVERLAPPING SITES OF BLADDER (H): Primary | ICD-10-CM

## 2024-03-20 LAB
ALBUMIN UR-MCNC: NEGATIVE MG/DL
APPEARANCE UR: CLEAR
BILIRUB UR QL STRIP: NEGATIVE
COLOR UR AUTO: ABNORMAL
GLUCOSE UR STRIP-MCNC: NEGATIVE MG/DL
HGB UR QL STRIP: NEGATIVE
KETONES UR STRIP-MCNC: NEGATIVE MG/DL
LEUKOCYTE ESTERASE UR QL STRIP: ABNORMAL
NITRATE UR QL: NEGATIVE
PH UR STRIP: 5 [PH] (ref 5–7)
SP GR UR STRIP: 1.01 (ref 1–1.03)
UROBILINOGEN UR STRIP-MCNC: NORMAL MG/DL

## 2024-03-20 PROCEDURE — 250N000011 HC RX IP 250 OP 636: Mod: JZ | Performed by: UROLOGY

## 2024-03-20 PROCEDURE — 51720 TREATMENT OF BLADDER LESION: CPT

## 2024-03-20 PROCEDURE — 250N000009 HC RX 250: Performed by: UROLOGY

## 2024-03-20 PROCEDURE — 81003 URINALYSIS AUTO W/O SCOPE: CPT | Performed by: UROLOGY

## 2024-03-20 RX ORDER — LIDOCAINE HYDROCHLORIDE 20 MG/ML
10 JELLY TOPICAL
Status: DISCONTINUED | OUTPATIENT
Start: 2024-03-20 | End: 2024-03-20 | Stop reason: HOSPADM

## 2024-03-20 RX ADMIN — BACILLUS CALMETTE-GUERIN 50 MG: 50 POWDER, FOR SUSPENSION INTRAVESICAL at 13:28

## 2024-03-20 RX ADMIN — LIDOCAINE HYDROCHLORIDE 10 ML: 20 JELLY TOPICAL at 13:26

## 2024-03-20 NOTE — PATIENT INSTRUCTIONS
Home discharge instructions:  -To make sure each treatment has the best chance of working, follow these steps 2 hours after each treatment   1. Lie on your back for 15 minutes   2. Lie on your left side for 15 minutes   3. Lie on your right side for 15 minutes   4. Lie on your stomach for 15 minutes   5. Get up but keep the medication in your bladder for 60 more minutes- for a total of 2 hours   6. Empty your bladder following the directions below (if you have difficulty holding your bladder it is ok to empty your bladder early)  -Wear pad if incontinence is a possibility  -Wash hands thoroughly after using the bathroom  -For safety reasons remember to follow these directions for 6 hours after each treatment:  (for 6 hours after your empty the BCG from your bladder)   1. Sit on the toilet seat to urinate   2. Immediately after urinating- add 2 cups of undiluted chlorine bleach to the toilet    3. Close lid and let the bleach sit for 15 minutes each time   4. Drink plenty of water to flush any remaining medication out of your bladder    **These steps will help kill all the BCG bacteria and disinfect the toilet**    -No intercourse or sexual activity for 48 hours after each treatment. A barrier (condom) should be used for 6 weeks after the last treatment.     Please void BCG at 330pm    Take your antibiotic today at 730 pm and tomorrow morning.      Please call your urology team with fevers or chills, burning with urination, or blood in your urine which lasts more than a 48-72 hours or with any question or concerns.

## 2024-03-20 NOTE — PROGRESS NOTES
"Infusion Nursing Note:  Mark Shultz presents today for BCG # 4 of 6.    Patient seen by provider today: No   present during visit today: Not Applicable.    Note: Pt takes bactrim now instead of the Levaquin that was originally ordered. Straight cath's himself at home, however does urinate during day. Is able to dwell for the full 2 hours. Turns at home. No side effects otherwise.       Treatment Conditions:   03/20/24 12:44   Nitrite Urine Negative     Results reviewed, labs MET treatment parameters, ok to proceed with treatment.    Pre-procedure Assessment:  Dysuria: No  Hematuria: No  Fever/chills: No  Increased urinary urgency: No  Increased urinary frequency: No    Procedure and Post-procedure assessment\"  14F Coude catheter placed.  Lidocaine urojet 2% 10ml used.  Catheter placed without trauma.  Clear/yellow urine drained from bladder.    Patient turned every 15 minutes per protocol: Yes, turning to be completed at home per protocol.   Bladder irrigated prior to second chemotherapy per protocol.  Patient tolerated instillation without incident.    Patient instructed on the following and verbalized understanding:   Medication to remain in the bladder for 2 hours post instillation: YES  Post instillation side effects and precautions discussed: YES    Discharge Plan:   Discharge instructions reviewed with: Patient.  Patient and/or family verbalized understanding of discharge instructions and all questions answered.  Copy of AVS reviewed with patient and/or family.  Patient will return 3/28/24 for next appointment.  Patient discharged in stable condition accompanied by: self.  Departure Mode: Ambulatory.    Kelli Salazar RN                            "

## 2024-03-28 ENCOUNTER — INFUSION THERAPY VISIT (OUTPATIENT)
Dept: INFUSION THERAPY | Facility: CLINIC | Age: 84
End: 2024-03-28
Attending: UROLOGY
Payer: MEDICARE

## 2024-03-28 VITALS
HEART RATE: 61 BPM | SYSTOLIC BLOOD PRESSURE: 137 MMHG | OXYGEN SATURATION: 96 % | RESPIRATION RATE: 20 BRPM | DIASTOLIC BLOOD PRESSURE: 75 MMHG | TEMPERATURE: 97.4 F

## 2024-03-28 DIAGNOSIS — C67.8 MALIGNANT NEOPLASM OF OVERLAPPING SITES OF BLADDER (H): Primary | ICD-10-CM

## 2024-03-28 LAB
ALBUMIN UR-MCNC: NEGATIVE MG/DL
APPEARANCE UR: CLEAR
BILIRUB UR QL STRIP: NEGATIVE
COLOR UR AUTO: ABNORMAL
GLUCOSE UR STRIP-MCNC: NEGATIVE MG/DL
HGB UR QL STRIP: NEGATIVE
KETONES UR STRIP-MCNC: NEGATIVE MG/DL
LEUKOCYTE ESTERASE UR QL STRIP: ABNORMAL
NITRATE UR QL: NEGATIVE
PH UR STRIP: 5.5 [PH] (ref 5–7)
SP GR UR STRIP: 1.02 (ref 1–1.03)
UROBILINOGEN UR STRIP-MCNC: NORMAL MG/DL

## 2024-03-28 PROCEDURE — 81003 URINALYSIS AUTO W/O SCOPE: CPT | Performed by: UROLOGY

## 2024-03-28 PROCEDURE — 250N000009 HC RX 250: Performed by: UROLOGY

## 2024-03-28 PROCEDURE — 51720 TREATMENT OF BLADDER LESION: CPT

## 2024-03-28 PROCEDURE — 250N000011 HC RX IP 250 OP 636: Mod: JZ | Performed by: UROLOGY

## 2024-03-28 RX ORDER — LIDOCAINE HYDROCHLORIDE 20 MG/ML
10 JELLY TOPICAL
Status: DISCONTINUED | OUTPATIENT
Start: 2024-03-28 | End: 2024-03-28 | Stop reason: HOSPADM

## 2024-03-28 RX ADMIN — LIDOCAINE HYDROCHLORIDE 10 ML: 20 JELLY TOPICAL at 13:50

## 2024-03-28 RX ADMIN — BACILLUS CALMETTE-GUERIN 50 MG: 50 POWDER, FOR SUSPENSION INTRAVESICAL at 13:52

## 2024-03-28 NOTE — PROGRESS NOTES
"Infusion Nursing Note:  Mark Shultz presents today for BCG.    Patient seen by provider today: No   present during visit today: Not Applicable.    Note: Pt prefers 14 Fr Coude pre lubricated catheter.      Treatment Conditions:   Latest Reference Range & Units 03/28/24 13:01   Nitrite Urine Negative  Negative   .    Pre-procedure Assessment:  Dysuria:  No  Hematuria:  No  Fever/chills:  No  Increased urinary urgency: Yes  Increased urinary frequency: Yes  Self caths prior to HS    Procedure and Post-procedure assessment\"  14F Coude catheter placed.  Lidocaine urojet 2% 10ml used.  Catheter placed without trauma.  Clear/yellow urine drained from bladder.    Patient turned every 15 minutes per protocol: Yes, turning to be completed at home per protocol.   Patient tolerated instillation without incident.  Patient will dwell for 120 minutes at home    Patient instructed on the following and verbalized understanding:   Medication to remain in the bladder for 2 hours post instillation: YES  Post instillation side effects and precautions discussed: YES  Also has Bactrim to take at home as directed  Discharge Plan:   Patient declined prescription refills.  Discharge instructions reviewed with: Patient.  Patient and/or family verbalized understanding of discharge instructions and all questions answered.  Patient discharged in stable condition accompanied by: self.  Departure Mode: Ambulatory.    Odette Perrin RN                              "

## 2024-04-04 ENCOUNTER — INFUSION THERAPY VISIT (OUTPATIENT)
Dept: INFUSION THERAPY | Facility: CLINIC | Age: 84
End: 2024-04-04
Attending: STUDENT IN AN ORGANIZED HEALTH CARE EDUCATION/TRAINING PROGRAM
Payer: MEDICARE

## 2024-04-04 VITALS
SYSTOLIC BLOOD PRESSURE: 134 MMHG | DIASTOLIC BLOOD PRESSURE: 84 MMHG | RESPIRATION RATE: 16 BRPM | HEART RATE: 61 BPM | OXYGEN SATURATION: 96 % | TEMPERATURE: 97 F

## 2024-04-04 DIAGNOSIS — C67.8 MALIGNANT NEOPLASM OF OVERLAPPING SITES OF BLADDER (H): Primary | ICD-10-CM

## 2024-04-04 PROCEDURE — 250N000011 HC RX IP 250 OP 636: Mod: JZ | Performed by: UROLOGY

## 2024-04-04 PROCEDURE — 81003 URINALYSIS AUTO W/O SCOPE: CPT | Performed by: UROLOGY

## 2024-04-04 PROCEDURE — 51720 TREATMENT OF BLADDER LESION: CPT

## 2024-04-04 PROCEDURE — 250N000009 HC RX 250: Performed by: UROLOGY

## 2024-04-04 RX ORDER — LIDOCAINE HYDROCHLORIDE 20 MG/ML
10 JELLY TOPICAL
Status: DISCONTINUED | OUTPATIENT
Start: 2024-04-04 | End: 2024-04-04 | Stop reason: HOSPADM

## 2024-04-04 RX ADMIN — BACILLUS CALMETTE-GUERIN 50 MG: 50 POWDER, FOR SUSPENSION INTRAVESICAL at 08:53

## 2024-04-04 RX ADMIN — LIDOCAINE HYDROCHLORIDE 10 ML: 20 JELLY TOPICAL at 08:47

## 2024-04-04 NOTE — PROGRESS NOTES
Nursing Note:  Mark Shultz presents today for Urine BCG.    Patient seen by provider today: No   present during visit today: Not Applicable.    Note: N/A.    Intravenous Access:  No Intravenous access/labs at this visit.    Discharge Plan:   Patient was sent to infusion for BCG appointment.    Kari Schoen, RN

## 2024-04-04 NOTE — PROGRESS NOTES
"Infusion Nursing Note:  Mark Shultz presents today for BCG dose 6 of 6.    Patient seen by provider today: No   present during visit today: Not Applicable.    Note: Patient has been able to hold BCG for 2 hours after each dose.  Instructed to take Bactrim 2 hours after BCG and tomorrow morning.  Patient self caths at home only at night.  Drained 800ml of clear yellow urine here today prior to BCG.  Patient prefers 14fr coude.      Treatment Conditions:  Results reviewed, labs MET treatment parameters, ok to proceed with treatment.  UA RESULTS:  Recent Labs   Lab Test 04/04/24  0746 02/14/24  1243 01/07/24 2026 01/05/24  2237 10/27/23  0901   COLOR Light Yellow   < > Dark Yellow*   < > Yellow   APPEARANCE Clear   < > Slightly Cloudy*   < > Clear   URINEGLC Negative   < > 200*   < > Negative   URINEBILI Negative   < > Negative   < > Negative   URINEKETONE Negative   < > Negative   < > Negative   SG 1.018   < > 1.015   < > 1.020   UBLD Negative   < > Large*   < > Small*   URINEPH 5.5   < > 6.0   < > 5.5   PROTEIN Negative   < > 100*   < > Negative   UROBILINOGEN  --   --   --   --  0.2   NITRITE Negative   < > Negative   < > Negative   LEUKEST Moderate*   < > Moderate*   < > Negative   RBCU  --   --  >182*   < >  --    WBCU  --   --  >100*   < >  --     < > = values in this interval not displayed.       Pre-procedure Assessment:  Dysuria: No  Hematuria: No  Fever/chills: No  Increased urinary urgency: No  Increased urinary frequency: No    Procedure and Post-procedure assessment\"  14F Coude agarwal catheter placed.  Lidocaine urojet 2% 10ml used.  Catheter placed without trauma.  Clear/yellow urine drained from bladder.    Patient turned every 15 minutes per protocol: Yes, turning to be completed at home per protocol.   Patient tolerated instillation without incident.    Patient instructed on the following and verbalized understanding:   Medication to remain in the bladder for 2 hours post instillation: " YES  Post instillation side effects and precautions discussed: YES    Discharge Plan:   Discharge instructions reviewed with: Patient.  Patient and/or family verbalized understanding of discharge instructions and all questions answered.  AVS to patient via [employer].  Patient will return 5/6/24 with Dr. Daniels for next appointment.   Patient discharged in stable condition accompanied by: self.  Departure Mode: Ambulatory.    Vira San RN

## 2024-05-06 ENCOUNTER — OFFICE VISIT (OUTPATIENT)
Dept: UROLOGY | Facility: CLINIC | Age: 84
End: 2024-05-06
Payer: MEDICARE

## 2024-05-06 VITALS
BODY MASS INDEX: 31.07 KG/M2 | DIASTOLIC BLOOD PRESSURE: 70 MMHG | HEIGHT: 68 IN | SYSTOLIC BLOOD PRESSURE: 128 MMHG | WEIGHT: 205 LBS

## 2024-05-06 DIAGNOSIS — R33.9 URINARY RETENTION: ICD-10-CM

## 2024-05-06 DIAGNOSIS — Z85.51 PERSONAL HISTORY OF MALIGNANT NEOPLASM OF BLADDER: Primary | ICD-10-CM

## 2024-05-06 DIAGNOSIS — N40.0 ENLARGED PROSTATE: ICD-10-CM

## 2024-05-06 DIAGNOSIS — Z79.2 PROPHYLACTIC ANTIBIOTIC: ICD-10-CM

## 2024-05-06 LAB
ALBUMIN UR-MCNC: NEGATIVE MG/DL
APPEARANCE UR: CLEAR
BILIRUB UR QL STRIP: NEGATIVE
COLOR UR AUTO: YELLOW
GLUCOSE UR STRIP-MCNC: NEGATIVE MG/DL
HGB UR QL STRIP: NEGATIVE
KETONES UR STRIP-MCNC: NEGATIVE MG/DL
LEUKOCYTE ESTERASE UR QL STRIP: ABNORMAL
NITRATE UR QL: NEGATIVE
PH UR STRIP: 6 [PH] (ref 5–7)
SP GR UR STRIP: 1.02 (ref 1–1.03)
UROBILINOGEN UR STRIP-ACNC: 0.2 E.U./DL

## 2024-05-06 PROCEDURE — 81003 URINALYSIS AUTO W/O SCOPE: CPT | Mod: QW | Performed by: UROLOGY

## 2024-05-06 PROCEDURE — 99213 OFFICE O/P EST LOW 20 MIN: CPT | Mod: 25 | Performed by: UROLOGY

## 2024-05-06 PROCEDURE — 52000 CYSTOURETHROSCOPY: CPT | Performed by: UROLOGY

## 2024-05-06 RX ORDER — LIDOCAINE HYDROCHLORIDE 20 MG/ML
JELLY TOPICAL ONCE
Status: COMPLETED | OUTPATIENT
Start: 2024-05-06 | End: 2024-05-06

## 2024-05-06 RX ORDER — SULFAMETHOXAZOLE/TRIMETHOPRIM 800-160 MG
1 TABLET ORAL ONCE
Qty: 1 TABLET | Refills: 0 | Status: SHIPPED | OUTPATIENT
Start: 2024-05-06 | End: 2024-05-06

## 2024-05-06 RX ADMIN — LIDOCAINE HYDROCHLORIDE: 20 JELLY TOPICAL at 10:52

## 2024-05-06 ASSESSMENT — PAIN SCALES - GENERAL: PAINLEVEL: NO PAIN (0)

## 2024-05-06 NOTE — PATIENT INSTRUCTIONS
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
## 2024-05-06 NOTE — NURSING NOTE
Chief Complaint   Patient presents with    Bladder cancer     Cystoscopy     Prior to the start of the procedure and with procedural staff participation, I verbally confirmed the patient s identity using two indicators, relevant allergies, that the procedure was appropriate and matched the consent or emergent situation, and that the correct equipment/implants were available. Immediately prior to starting the procedure I conducted the Time Out with the procedural staff and re-confirmed the patient s name, procedure, and site/side. I have wiped the patient off with the povidone-Iodine solution, draped them, and used Lidocaine hydrochloride jelly. (The Joint Commission universal protocol was followed.)  Yes    Sedation (Moderate or Deep): None      5mL 2% lidocaine hydrochloride Urojet instilled into urethra.    NDC# [address]-2  Lot #: [id number]  Expiration Date:  12/25    Joanne Geronimo EMT
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
## 2024-05-08 DIAGNOSIS — Z79.2 PROPHYLACTIC ANTIBIOTIC: ICD-10-CM

## 2024-05-08 DIAGNOSIS — C67.8 MALIGNANT NEOPLASM OF OVERLAPPING SITES OF BLADDER (H): Primary | ICD-10-CM

## 2024-05-08 RX ORDER — PETROLATUM,WHITE
OINTMENT IN PACKET (GRAM) TOPICAL
OUTPATIENT
Start: 2027-04-03

## 2024-05-08 RX ORDER — PETROLATUM,WHITE
OINTMENT IN PACKET (GRAM) TOPICAL
OUTPATIENT
Start: 2026-10-12

## 2024-05-08 RX ORDER — PETROLATUM,WHITE
OINTMENT IN PACKET (GRAM) TOPICAL
OUTPATIENT
Start: 2026-10-05

## 2024-05-08 RX ORDER — LIDOCAINE HYDROCHLORIDE 20 MG/ML
10 JELLY TOPICAL
Status: CANCELLED | OUTPATIENT
Start: 2024-07-24

## 2024-05-08 RX ORDER — LIDOCAINE HYDROCHLORIDE 20 MG/ML
10 JELLY TOPICAL
OUTPATIENT
Start: 2025-10-03

## 2024-05-08 RX ORDER — PETROLATUM,WHITE
OINTMENT IN PACKET (GRAM) TOPICAL
OUTPATIENT
Start: 2026-04-01

## 2024-05-08 RX ORDER — PETROLATUM,WHITE
OINTMENT IN PACKET (GRAM) TOPICAL
OUTPATIENT
Start: 2026-04-08

## 2024-05-08 RX ORDER — LIDOCAINE HYDROCHLORIDE 20 MG/ML
10 JELLY TOPICAL
OUTPATIENT
Start: 2027-04-03

## 2024-05-08 RX ORDER — PETROLATUM,WHITE
OINTMENT IN PACKET (GRAM) TOPICAL
OUTPATIENT
Start: 2025-04-06

## 2024-05-08 RX ORDER — PETROLATUM,WHITE
OINTMENT IN PACKET (GRAM) TOPICAL
OUTPATIENT
Start: 2025-10-03

## 2024-05-08 RX ORDER — LIDOCAINE HYDROCHLORIDE 20 MG/ML
10 JELLY TOPICAL
OUTPATIENT
Start: 2024-10-08

## 2024-05-08 RX ORDER — PETROLATUM,WHITE
OINTMENT IN PACKET (GRAM) TOPICAL
OUTPATIENT
Start: 2024-10-08

## 2024-05-08 RX ORDER — LIDOCAINE HYDROCHLORIDE 20 MG/ML
10 JELLY TOPICAL
OUTPATIENT
Start: 2026-04-08

## 2024-05-08 RX ORDER — LIDOCAINE HYDROCHLORIDE 20 MG/ML
10 JELLY TOPICAL
Status: CANCELLED | OUTPATIENT
Start: 2024-07-17

## 2024-05-08 RX ORDER — LIDOCAINE HYDROCHLORIDE 20 MG/ML
10 JELLY TOPICAL
Status: CANCELLED | OUTPATIENT
Start: 2024-06-06

## 2024-05-08 RX ORDER — PETROLATUM,WHITE
OINTMENT IN PACKET (GRAM) TOPICAL
OUTPATIENT
Start: 2026-04-15

## 2024-05-08 RX ORDER — PETROLATUM,WHITE
OINTMENT IN PACKET (GRAM) TOPICAL
OUTPATIENT
Start: 2024-10-22

## 2024-05-08 RX ORDER — PETROLATUM,WHITE
OINTMENT IN PACKET (GRAM) TOPICAL
Status: CANCELLED | OUTPATIENT
Start: 2024-06-06

## 2024-05-08 RX ORDER — PETROLATUM,WHITE
OINTMENT IN PACKET (GRAM) TOPICAL
OUTPATIENT
Start: 2025-10-10

## 2024-05-08 RX ORDER — PETROLATUM,WHITE
OINTMENT IN PACKET (GRAM) TOPICAL
OUTPATIENT
Start: 2027-03-27

## 2024-05-08 RX ORDER — PETROLATUM,WHITE
OINTMENT IN PACKET (GRAM) TOPICAL
OUTPATIENT
Start: 2027-04-10

## 2024-05-08 RX ORDER — LIDOCAINE HYDROCHLORIDE 20 MG/ML
10 JELLY TOPICAL
OUTPATIENT
Start: 2024-10-15

## 2024-05-08 RX ORDER — PETROLATUM,WHITE
OINTMENT IN PACKET (GRAM) TOPICAL
OUTPATIENT
Start: 2026-09-28

## 2024-05-08 RX ORDER — PETROLATUM,WHITE
OINTMENT IN PACKET (GRAM) TOPICAL
OUTPATIENT
Start: 2025-04-13

## 2024-05-08 RX ORDER — LIDOCAINE HYDROCHLORIDE 20 MG/ML
10 JELLY TOPICAL
OUTPATIENT
Start: 2024-10-22

## 2024-05-08 RX ORDER — PETROLATUM,WHITE
OINTMENT IN PACKET (GRAM) TOPICAL
OUTPATIENT
Start: 2024-10-15

## 2024-05-08 RX ORDER — LIDOCAINE HYDROCHLORIDE 20 MG/ML
10 JELLY TOPICAL
OUTPATIENT
Start: 2026-10-05

## 2024-05-08 RX ORDER — LIDOCAINE HYDROCHLORIDE 20 MG/ML
10 JELLY TOPICAL
OUTPATIENT
Start: 2025-10-10

## 2024-05-08 RX ORDER — LIDOCAINE HYDROCHLORIDE 20 MG/ML
10 JELLY TOPICAL
OUTPATIENT
Start: 2025-10-17

## 2024-05-08 RX ORDER — SULFAMETHOXAZOLE/TRIMETHOPRIM 800-160 MG
TABLET ORAL
Qty: 6 TABLET | Refills: 4 | Status: SHIPPED | OUTPATIENT
Start: 2024-05-08 | End: 2024-08-11

## 2024-05-08 RX ORDER — LIDOCAINE HYDROCHLORIDE 20 MG/ML
10 JELLY TOPICAL
OUTPATIENT
Start: 2027-03-27

## 2024-05-08 RX ORDER — PETROLATUM,WHITE
OINTMENT IN PACKET (GRAM) TOPICAL
Status: CANCELLED | OUTPATIENT
Start: 2024-07-24

## 2024-05-08 RX ORDER — LIDOCAINE HYDROCHLORIDE 20 MG/ML
10 JELLY TOPICAL
OUTPATIENT
Start: 2025-04-20

## 2024-05-08 RX ORDER — LIDOCAINE HYDROCHLORIDE 20 MG/ML
10 JELLY TOPICAL
OUTPATIENT
Start: 2026-09-28

## 2024-05-08 RX ORDER — PETROLATUM,WHITE
OINTMENT IN PACKET (GRAM) TOPICAL
Status: CANCELLED | OUTPATIENT
Start: 2024-07-17

## 2024-05-08 RX ORDER — LIDOCAINE HYDROCHLORIDE 20 MG/ML
10 JELLY TOPICAL
OUTPATIENT
Start: 2026-04-15

## 2024-05-08 RX ORDER — PETROLATUM,WHITE
OINTMENT IN PACKET (GRAM) TOPICAL
OUTPATIENT
Start: 2025-04-20

## 2024-05-08 RX ORDER — PETROLATUM,WHITE
OINTMENT IN PACKET (GRAM) TOPICAL
OUTPATIENT
Start: 2025-10-17

## 2024-05-08 RX ORDER — LIDOCAINE HYDROCHLORIDE 20 MG/ML
10 JELLY TOPICAL
OUTPATIENT
Start: 2027-04-10

## 2024-05-08 RX ORDER — LIDOCAINE HYDROCHLORIDE 20 MG/ML
10 JELLY TOPICAL
OUTPATIENT
Start: 2026-04-01

## 2024-05-08 RX ORDER — LIDOCAINE HYDROCHLORIDE 20 MG/ML
10 JELLY TOPICAL
OUTPATIENT
Start: 2026-10-12

## 2024-05-08 RX ORDER — LIDOCAINE HYDROCHLORIDE 20 MG/ML
10 JELLY TOPICAL
OUTPATIENT
Start: 2025-04-06

## 2024-05-08 RX ORDER — LIDOCAINE HYDROCHLORIDE 20 MG/ML
10 JELLY TOPICAL
OUTPATIENT
Start: 2025-04-13

## 2024-07-10 ENCOUNTER — INFUSION THERAPY VISIT (OUTPATIENT)
Dept: INFUSION THERAPY | Facility: CLINIC | Age: 84
End: 2024-07-10
Attending: UROLOGY
Payer: MEDICARE

## 2024-07-10 VITALS
DIASTOLIC BLOOD PRESSURE: 65 MMHG | TEMPERATURE: 97.9 F | SYSTOLIC BLOOD PRESSURE: 135 MMHG | HEART RATE: 56 BPM | RESPIRATION RATE: 16 BRPM | OXYGEN SATURATION: 95 %

## 2024-07-10 DIAGNOSIS — C67.8 MALIGNANT NEOPLASM OF OVERLAPPING SITES OF BLADDER (H): Primary | ICD-10-CM

## 2024-07-10 LAB
ALBUMIN UR-MCNC: 20 MG/DL
APPEARANCE UR: ABNORMAL
BILIRUB UR QL STRIP: NEGATIVE
COLOR UR AUTO: YELLOW
GLUCOSE UR STRIP-MCNC: NEGATIVE MG/DL
HGB UR QL STRIP: ABNORMAL
KETONES UR STRIP-MCNC: NEGATIVE MG/DL
LEUKOCYTE ESTERASE UR QL STRIP: ABNORMAL
NITRATE UR QL: NEGATIVE
PH UR STRIP: 5.5 [PH] (ref 5–7)
SP GR UR STRIP: 1.01 (ref 1–1.03)
UROBILINOGEN UR STRIP-MCNC: NORMAL MG/DL

## 2024-07-10 PROCEDURE — 51720 TREATMENT OF BLADDER LESION: CPT

## 2024-07-10 PROCEDURE — 250N000009 HC RX 250: Performed by: UROLOGY

## 2024-07-10 PROCEDURE — 81003 URINALYSIS AUTO W/O SCOPE: CPT | Performed by: UROLOGY

## 2024-07-10 PROCEDURE — 250N000011 HC RX IP 250 OP 636: Performed by: UROLOGY

## 2024-07-10 RX ORDER — LIDOCAINE HYDROCHLORIDE 20 MG/ML
10 JELLY TOPICAL
Status: DISCONTINUED | OUTPATIENT
Start: 2024-07-10 | End: 2024-07-10 | Stop reason: HOSPADM

## 2024-07-10 RX ADMIN — BACILLUS CALMETTE-GUERIN 50 MG: 50 POWDER, FOR SUSPENSION INTRAVESICAL at 13:35

## 2024-07-10 RX ADMIN — LIDOCAINE HYDROCHLORIDE 10 ML: 20 JELLY TOPICAL at 13:20

## 2024-07-10 NOTE — PROGRESS NOTES
"Infusion Nursing Note:  Mark Shultz presents today for BCG #1 of 3.    Patient seen by provider today: No   present during visit today: Not Applicable.    Note:  Mark reports he is feeling well today.  He states he established care with new PCP through [facility] yesterday.    He reports urinary urgency and frequency is unchanged.  He denies pain with urination, but states he has been having rectal pain after urination.  He states he has discussed this with his PCP.  Mark reports that he self-caths every other night and he will do so tonight, as instructed.  He confirms that he was able to hold urine for full 2 hours with no issue after previous BCG treatments.  He denies being on any anti-TNF inhibitors.  Called [facility] to request that they fill his Bactrim prescription.  He confirms that he will  Bactrim prescription on his way home and will take as prescribed.    Patient prefers 14Fr Coude cathether, 600 ml urine drained upon agarwal insertion.      Treatment Conditions:  Lab Test 07/10/24  1226   COLOR Yellow   APPEARANCE Slightly Cloudy*   URINEGLC Negative   URINEBILI Negative   URINEKETONE Negative   SG 1.014   UBLD Trace*   URINEPH 5.5   PROTEIN 20*   UROBILINOGEN  --    NITRITE Negative   LEUKEST Large*   RBCU  --    WBCU  --      Results reviewed, labs MET treatment parameters, ok to proceed with treatment.    Pre-procedure Assessment:  Dysuria: No (Reports rectal pain with voiding, states he discussed this with provider.)  Hematuria:  No  Fever/chills:  No  Increased urinary urgency:  No--Reports baseline urinary frequency unchanged.  Increased urinary frequency:  No--Reports baseline urgency unchanged.    Procedure and Post-procedure assessment\"  14F Coude catheter placed.  Lidocaine urojet 2% 10ml used.  Catheter placed without trauma.  Slightly cloudy, yellow urine drained from bladder.  Total of 600 ml drained.  Patient turned every 15 minutes per protocol: Yes, turning to be completed " at home per protocol.   Patient tolerated instillation without incident.    Patient instructed on the following and verbalized understanding:   Medication to remain in the bladder for 2 hours post instillation: YES  Post instillation side effects and precautions discussed: YES    Discharge Plan:   Discharge instructions reviewed with: Patient.  Patient and/or family verbalized understanding of discharge instructions and all questions answered.  AVS to patient via [employer].  Patient will return 7/17 for next appointment.   Patient discharged in stable condition accompanied by: self.  Departure Mode: Ambulatory.      Reyes Madison RN

## 2024-07-10 NOTE — PATIENT INSTRUCTIONS
Please void BCG today at 3:35 PM.    Take your antibiotic (Bactrim) today at 7:35 PM and tomorrow morning.      Please call your urology team with fevers or chills, burning with urination, or blood in your urine which lasts more than a 48-72 hours or with any question or concerns.

## 2024-07-17 ENCOUNTER — INFUSION THERAPY VISIT (OUTPATIENT)
Dept: INFUSION THERAPY | Facility: CLINIC | Age: 84
End: 2024-07-17
Attending: UROLOGY
Payer: MEDICARE

## 2024-07-17 VITALS
DIASTOLIC BLOOD PRESSURE: 69 MMHG | HEART RATE: 56 BPM | RESPIRATION RATE: 16 BRPM | SYSTOLIC BLOOD PRESSURE: 141 MMHG | OXYGEN SATURATION: 94 % | TEMPERATURE: 97.2 F

## 2024-07-17 DIAGNOSIS — C67.8 MALIGNANT NEOPLASM OF OVERLAPPING SITES OF BLADDER (H): Primary | ICD-10-CM

## 2024-07-17 LAB
ALBUMIN UR-MCNC: NEGATIVE MG/DL
APPEARANCE UR: ABNORMAL
BILIRUB UR QL STRIP: NEGATIVE
COLOR UR AUTO: YELLOW
GLUCOSE UR STRIP-MCNC: NEGATIVE MG/DL
HGB UR QL STRIP: NEGATIVE
KETONES UR STRIP-MCNC: ABNORMAL MG/DL
LEUKOCYTE ESTERASE UR QL STRIP: ABNORMAL
NITRATE UR QL: NEGATIVE
PH UR STRIP: 6 [PH] (ref 5–7)
SP GR UR STRIP: 1.01 (ref 1–1.03)
UROBILINOGEN UR STRIP-MCNC: 12 MG/DL

## 2024-07-17 PROCEDURE — 51720 TREATMENT OF BLADDER LESION: CPT

## 2024-07-17 PROCEDURE — 250N000011 HC RX IP 250 OP 636: Performed by: UROLOGY

## 2024-07-17 PROCEDURE — 250N000009 HC RX 250: Performed by: UROLOGY

## 2024-07-17 PROCEDURE — 81003 URINALYSIS AUTO W/O SCOPE: CPT | Performed by: UROLOGY

## 2024-07-17 RX ORDER — LIDOCAINE HYDROCHLORIDE 20 MG/ML
10 JELLY TOPICAL
Status: DISCONTINUED | OUTPATIENT
Start: 2024-07-17 | End: 2024-07-17 | Stop reason: HOSPADM

## 2024-07-17 RX ADMIN — LIDOCAINE HYDROCHLORIDE 10 ML: 20 JELLY TOPICAL at 14:01

## 2024-07-17 RX ADMIN — BACILLUS CALMETTE-GUERIN 50 MG: 50 POWDER, FOR SUSPENSION INTRAVESICAL at 14:10

## 2024-07-17 NOTE — PROGRESS NOTES
"Infusion Nursing Note:  Mark Shultz presents today for BCG # 2 of 3.    Patient seen by provider today: No   present during visit today: Not Applicable.    Note: Mark reports he is feeling well, denies fever/chills/cough/SOB/pain. He does report he had some dark urine - tea colored - the evening after his last BCG, but this cleared up the next day. Denies blood in urine and urgency. No changes in his baseline urinary frequency.    He confirms he has Bactrim prescription for this evening and tomorrow morning.     Mark is not on any anti-TNF inhibitors.     Treatment Conditions:  Results reviewed, labs MET treatment parameters, ok to proceed with treatment.    Pre-procedure Assessment:  Dysuria:  No  Hematuria:  No  Fever/chills:  No  Increased urinary urgency:  No  Increased urinary frequency: Yes (reports he has baseline frequency)    Procedure and Post-procedure assessment\"  14F Coude catheter placed.  Lidocaine urojet 2% 10ml used.  Catheter placed without trauma.  Clear/yellow urine drained from bladder.    Patient turned every 15 minutes per protocol: Yes, turning to be completed at home per protocol.   Patient tolerated instillation without incident.  Patient dwelled for 120 minutes at home.    Patient instructed on the following and verbalized understanding:   Medication to remain in the bladder for 2 hours post instillation: YES  Post instillation side effects and precautions discussed: YES    Discharge Plan:   Discharge instructions reviewed with: Patient.  Copy of AVS reviewed with patient and/or family.  Patient will return 7/24 for next appointment.  Patient discharged in stable condition accompanied by: self.  Departure Mode: Ambulatory.    Taiwo Crockett RN, RN                            "

## 2024-07-17 NOTE — PATIENT INSTRUCTIONS
Home discharge instructions:  -To make sure each treatment has the best chance of working, follow these steps 2 hours after each treatment   1. Lie on your back for 15 minutes   2. Lie on your left side for 15 minutes   3. Lie on your right side for 15 minutes   4. Lie on your stomach for 15 minutes   5. Get up but keep the medication in your bladder for 60 more minutes- for a total of 2 hours   6. Empty your bladder following the directions below (if you have difficulty holding your bladder it is ok to empty your bladder early)  -Wear pad if incontinence is a possibility  -Wash hands thoroughly after using the bathroom  -For safety reasons remember to follow these directions for 6 hours after each treatment:  (for 6 hours after your empty the BCG from your bladder)   1. Sit on the toilet seat to urinate   2. Immediately after urinating- add 2 cups of undiluted chlorine bleach to the toilet    3. Close lid and let the bleach sit for 15 minutes each time   4. Drink plenty of water to flush any remaining medication out of your bladder    **These steps will help kill all the BCG bacteria and disinfect the toilet**    -No intercourse or sexual activity for 48 hours after each treatment. A barrier (condom) should be used for 6 weeks after the last treatment.     Please void BCG at 4:30PM    Take your antibiotic today at 8:30 pm and tomorrow morning.      Please call your urology team with fevers or chills, burning with urination, or blood in your urine which lasts more than a 48-72 hours or with any question or concerns.

## 2024-07-24 ENCOUNTER — INFUSION THERAPY VISIT (OUTPATIENT)
Dept: INFUSION THERAPY | Facility: CLINIC | Age: 84
End: 2024-07-24
Attending: UROLOGY
Payer: MEDICARE

## 2024-07-24 VITALS
TEMPERATURE: 97.7 F | RESPIRATION RATE: 16 BRPM | HEART RATE: 60 BPM | DIASTOLIC BLOOD PRESSURE: 81 MMHG | SYSTOLIC BLOOD PRESSURE: 161 MMHG | OXYGEN SATURATION: 95 %

## 2024-07-24 DIAGNOSIS — C67.8 MALIGNANT NEOPLASM OF OVERLAPPING SITES OF BLADDER (H): Primary | ICD-10-CM

## 2024-07-24 LAB
ALBUMIN UR-MCNC: NEGATIVE MG/DL
APPEARANCE UR: ABNORMAL
BILIRUB UR QL STRIP: NEGATIVE
COLOR UR AUTO: YELLOW
GLUCOSE UR STRIP-MCNC: 30 MG/DL
HGB UR QL STRIP: NEGATIVE
KETONES UR STRIP-MCNC: NEGATIVE MG/DL
LEUKOCYTE ESTERASE UR QL STRIP: ABNORMAL
NITRATE UR QL: NEGATIVE
PH UR STRIP: 6 [PH] (ref 5–7)
SP GR UR STRIP: 1.01 (ref 1–1.03)
UROBILINOGEN UR STRIP-MCNC: 12 MG/DL

## 2024-07-24 PROCEDURE — 51720 TREATMENT OF BLADDER LESION: CPT

## 2024-07-24 PROCEDURE — 250N000009 HC RX 250: Performed by: UROLOGY

## 2024-07-24 PROCEDURE — 250N000011 HC RX IP 250 OP 636: Performed by: UROLOGY

## 2024-07-24 PROCEDURE — 81003 URINALYSIS AUTO W/O SCOPE: CPT | Performed by: UROLOGY

## 2024-07-24 RX ORDER — LIDOCAINE HYDROCHLORIDE 20 MG/ML
10 JELLY TOPICAL
Status: DISCONTINUED | OUTPATIENT
Start: 2024-07-24 | End: 2024-07-24 | Stop reason: HOSPADM

## 2024-07-24 RX ADMIN — LIDOCAINE HYDROCHLORIDE 10 ML: 20 JELLY TOPICAL at 13:31

## 2024-07-24 RX ADMIN — BACILLUS CALMETTE-GUERIN 50 MG: 50 POWDER, FOR SUSPENSION INTRAVESICAL at 13:59

## 2024-07-24 NOTE — PATIENT INSTRUCTIONS
Please void BCG today 4 PM.    Take your antibiotic today at 8 PM and tomorrow morning.      Please call your urology team with fevers or chills, burning with urination, or blood in your urine which lasts more than a 48-72 hours or with any question or concerns.

## 2024-07-24 NOTE — PROGRESS NOTES
"Infusion Nursing Note:  Mark Shultz presents today for BCG #3 of 3.    Patient seen by provider today: No   present during visit today: Not Applicable.    Note: Mark reports he is feeling well today.  He confirms that he was able to hold urine for full 2 hours with no issue after previous BCG treatments.  He confirms that he has Bactrim at home and will take as prescribed.  He denies being on any anti-TNF inhibitors.    Treatment Conditions:  Lab Test 07/24/24  1250   COLOR Yellow   APPEARANCE Slightly Cloudy*   URINEGLC 30*   URINEBILI Negative   URINEKETONE Negative   SG 1.013   UBLD Negative   URINEPH 6.0   PROTEIN Negative   UROBILINOGEN  --    NITRITE Negative   LEUKEST Moderate*   RBCU  --    WBCU  --      Results reviewed, labs MET treatment parameters, ok to proceed with treatment.    Pre-procedure Assessment:  Dysuria: No  Hematuria: No  Fever/chills: No  Increased urinary urgency: No  Increased urinary frequency: No    Procedure and Post-procedure assessment\"  14F Coude catheter placed.  Lidocaine urojet 2% 10ml used.  Catheter placed without trauma.  Clear/yellow urine drained from bladder.    Patient turned every 15 minutes per protocol: Yes, turning to be completed at home per protocol.   Patient tolerated instillation without incident.    Patient instructed on the following and verbalized understanding:   Medication to remain in the bladder for 2 hours post instillation: YES  Post instillation side effects and precautions discussed: YES    Discharge Plan:   Discharge instructions reviewed with: Patient.  Patient and/or family verbalized understanding of discharge instructions and all questions answered.  AVS to patient via [employer].  Patient will follow-up with MD as scheduled on 8/14.   Patient discharged in stable condition accompanied by: self.  Departure Mode: Ambulatory.    Reyes Madison RN  "

## 2024-08-11 ENCOUNTER — HOSPITAL ENCOUNTER (INPATIENT)
Facility: CLINIC | Age: 84
LOS: 4 days | Discharge: HOME HEALTH | DRG: 872 | End: 2024-08-15
Attending: EMERGENCY MEDICINE | Admitting: INTERNAL MEDICINE
Payer: MEDICARE

## 2024-08-11 ENCOUNTER — APPOINTMENT (OUTPATIENT)
Dept: GENERAL RADIOLOGY | Facility: CLINIC | Age: 84
DRG: 872 | End: 2024-08-11
Attending: EMERGENCY MEDICINE
Payer: MEDICARE

## 2024-08-11 DIAGNOSIS — R82.90 ABNORMAL URINALYSIS: ICD-10-CM

## 2024-08-11 DIAGNOSIS — R33.9 URINARY RETENTION: ICD-10-CM
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
## 2024-08-11 DIAGNOSIS — S90.414A ABRASION OF TOE OF RIGHT FOOT, INITIAL ENCOUNTER: ICD-10-CM
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
## 2024-08-11 DIAGNOSIS — E87.20 LACTIC ACIDOSIS: ICD-10-CM

## 2024-08-11 DIAGNOSIS — I10 HYPERTENSION, UNSPECIFIED TYPE: Primary | ICD-10-CM

## 2024-08-11 DIAGNOSIS — R53.1 GENERALIZED WEAKNESS: ICD-10-CM

## 2024-08-11 DIAGNOSIS — Z79.01 ANTICOAGULATED: ICD-10-CM

## 2024-08-11 DIAGNOSIS — D72.829 LEUKOCYTOSIS, UNSPECIFIED TYPE: ICD-10-CM

## 2024-08-11 LAB
ALBUMIN SERPL BCG-MCNC: 4 G/DL (ref 3.5–5.2)
ALBUMIN UR-MCNC: 30 MG/DL
ALP SERPL-CCNC: 66 U/L (ref 40–150)
ALT SERPL W P-5'-P-CCNC: 12 U/L (ref 0–70)
ANION GAP SERPL CALCULATED.3IONS-SCNC: 15 MMOL/L (ref 7–15)
APPEARANCE UR: ABNORMAL
AST SERPL W P-5'-P-CCNC: 24 U/L (ref 0–45)
BILIRUB SERPL-MCNC: 1.1 MG/DL
BILIRUB UR QL STRIP: NEGATIVE
BUN SERPL-MCNC: 22.3 MG/DL (ref 8–23)
CALCIUM SERPL-MCNC: 9 MG/DL (ref 8.8–10.4)
CHLORIDE SERPL-SCNC: 94 MMOL/L (ref 98–107)
CK SERPL-CCNC: 247 U/L (ref 39–308)
COLOR UR AUTO: ABNORMAL
CREAT SERPL-MCNC: 1.1 MG/DL (ref 0.67–1.17)
EGFRCR SERPLBLD CKD-EPI 2021: 67 ML/MIN/1.73M2
FLUAV RNA SPEC QL NAA+PROBE: NEGATIVE
FLUBV RNA RESP QL NAA+PROBE: NEGATIVE
GLUCOSE BLDC GLUCOMTR-MCNC: 173 MG/DL (ref 70–99)
GLUCOSE BLDC GLUCOMTR-MCNC: 182 MG/DL (ref 70–99)
GLUCOSE SERPL-MCNC: 258 MG/DL (ref 70–99)
GLUCOSE UR STRIP-MCNC: NEGATIVE MG/DL
HBA1C MFR BLD: 7.6 %
HCO3 SERPL-SCNC: 23 MMOL/L (ref 22–29)
HGB UR QL STRIP: ABNORMAL
HYALINE CASTS: 3 /LPF
KETONES UR STRIP-MCNC: NEGATIVE MG/DL
LACTATE SERPL-SCNC: 2.3 MMOL/L (ref 0.7–2)
LACTATE SERPL-SCNC: 3.6 MMOL/L (ref 0.7–2)
LEUKOCYTE ESTERASE UR QL STRIP: ABNORMAL
MUCOUS THREADS #/AREA URNS LPF: PRESENT /LPF
NITRATE UR QL: NEGATIVE
PH UR STRIP: 5.5 [PH] (ref 5–7)
POTASSIUM SERPL-SCNC: 4.1 MMOL/L (ref 3.4–5.3)
PROT SERPL-MCNC: 6.8 G/DL (ref 6.4–8.3)
RBC URINE: >182 /HPF
RSV RNA SPEC NAA+PROBE: NEGATIVE
SARS-COV-2 RNA RESP QL NAA+PROBE: NEGATIVE
SODIUM SERPL-SCNC: 132 MMOL/L (ref 135–145)
SP GR UR STRIP: 1.02 (ref 1–1.03)
TROPONIN T SERPL HS-MCNC: 21 NG/L
TSH SERPL DL<=0.005 MIU/L-ACNC: 2.67 UIU/ML (ref 0.3–4.2)
UROBILINOGEN UR STRIP-MCNC: NORMAL MG/DL
WBC CLUMPS #/AREA URNS HPF: PRESENT /HPF
WBC URINE: >182 /HPF

## 2024-08-11 PROCEDURE — 84484 ASSAY OF TROPONIN QUANT: CPT | Performed by: EMERGENCY MEDICINE

## 2024-08-11 PROCEDURE — 250N000011 HC RX IP 250 OP 636: Performed by: INTERNAL MEDICINE

## 2024-08-11 PROCEDURE — 83605 ASSAY OF LACTIC ACID: CPT | Performed by: EMERGENCY MEDICINE

## 2024-08-11 PROCEDURE — 83036 HEMOGLOBIN GLYCOSYLATED A1C: CPT | Performed by: INTERNAL MEDICINE

## 2024-08-11 PROCEDURE — 120N000001 HC R&B MED SURG/OB

## 2024-08-11 PROCEDURE — 87040 BLOOD CULTURE FOR BACTERIA: CPT | Performed by: EMERGENCY MEDICINE

## 2024-08-11 PROCEDURE — 85027 COMPLETE CBC AUTOMATED: CPT | Performed by: EMERGENCY MEDICINE

## 2024-08-11 PROCEDURE — 96365 THER/PROPH/DIAG IV INF INIT: CPT

## 2024-08-11 PROCEDURE — 250N000011 HC RX IP 250 OP 636: Performed by: EMERGENCY MEDICINE

## 2024-08-11 PROCEDURE — 82550 ASSAY OF CK (CPK): CPT | Performed by: EMERGENCY MEDICINE

## 2024-08-11 PROCEDURE — 258N000003 HC RX IP 258 OP 636: Performed by: EMERGENCY MEDICINE

## 2024-08-11 PROCEDURE — 87086 URINE CULTURE/COLONY COUNT: CPT | Performed by: EMERGENCY MEDICINE

## 2024-08-11 PROCEDURE — 99222 1ST HOSP IP/OBS MODERATE 55: CPT | Mod: AI | Performed by: INTERNAL MEDICINE

## 2024-08-11 PROCEDURE — 87637 SARSCOV2&INF A&B&RSV AMP PRB: CPT | Performed by: EMERGENCY MEDICINE

## 2024-08-11 PROCEDURE — 85007 BL SMEAR W/DIFF WBC COUNT: CPT | Performed by: EMERGENCY MEDICINE

## 2024-08-11 PROCEDURE — 258N000003 HC RX IP 258 OP 636: Performed by: INTERNAL MEDICINE

## 2024-08-11 PROCEDURE — 71046 X-RAY EXAM CHEST 2 VIEWS: CPT

## 2024-08-11 PROCEDURE — 51701 INSERT BLADDER CATHETER: CPT

## 2024-08-11 PROCEDURE — 250N000012 HC RX MED GY IP 250 OP 636 PS 637: Performed by: INTERNAL MEDICINE

## 2024-08-11 PROCEDURE — 80053 COMPREHEN METABOLIC PANEL: CPT | Performed by: EMERGENCY MEDICINE

## 2024-08-11 PROCEDURE — 93005 ELECTROCARDIOGRAM TRACING: CPT

## 2024-08-11 PROCEDURE — 84443 ASSAY THYROID STIM HORMONE: CPT | Performed by: INTERNAL MEDICINE

## 2024-08-11 PROCEDURE — 81001 URINALYSIS AUTO W/SCOPE: CPT | Performed by: EMERGENCY MEDICINE

## 2024-08-11 PROCEDURE — 96366 THER/PROPH/DIAG IV INF ADDON: CPT

## 2024-08-11 PROCEDURE — 82962 GLUCOSE BLOOD TEST: CPT

## 2024-08-11 PROCEDURE — 36415 COLL VENOUS BLD VENIPUNCTURE: CPT | Performed by: EMERGENCY MEDICINE

## 2024-08-11 PROCEDURE — 99285 EMERGENCY DEPT VISIT HI MDM: CPT | Mod: 25

## 2024-08-11 RX ORDER — ACETAMINOPHEN 650 MG/1
650 SUPPOSITORY RECTAL EVERY 4 HOURS PRN
Status: DISCONTINUED | OUTPATIENT
Start: 2024-08-11 | End: 2024-08-15 | Stop reason: HOSPADM

## 2024-08-11 RX ORDER — LIDOCAINE 40 MG/G
CREAM TOPICAL
Status: DISCONTINUED | OUTPATIENT
Start: 2024-08-11 | End: 2024-08-15 | Stop reason: HOSPADM

## 2024-08-11 RX ORDER — LOSARTAN POTASSIUM 50 MG/1
50 TABLET ORAL DAILY
Status: DISCONTINUED | OUTPATIENT
Start: 2024-08-11 | End: 2024-08-15 | Stop reason: HOSPADM

## 2024-08-11 RX ORDER — ACETAMINOPHEN 325 MG/1
650 TABLET ORAL EVERY 4 HOURS PRN
Status: DISCONTINUED | OUTPATIENT
Start: 2024-08-11 | End: 2024-08-15 | Stop reason: HOSPADM

## 2024-08-11 RX ORDER — CEFADROXIL 500 MG/1
1000 CAPSULE ORAL 2 TIMES DAILY
Status: ON HOLD | COMMUNITY
Start: 2024-08-09 | End: 2024-08-14

## 2024-08-11 RX ORDER — TAMSULOSIN HYDROCHLORIDE 0.4 MG/1
0.4 CAPSULE ORAL 2 TIMES DAILY
Status: DISCONTINUED | OUTPATIENT
Start: 2024-08-11 | End: 2024-08-15 | Stop reason: HOSPADM

## 2024-08-11 RX ORDER — SODIUM CHLORIDE 9 MG/ML
INJECTION, SOLUTION INTRAVENOUS CONTINUOUS
Status: ACTIVE | OUTPATIENT
Start: 2024-08-11 | End: 2024-08-12

## 2024-08-11 RX ORDER — GLIPIZIDE 10 MG/1
20 TABLET, FILM COATED, EXTENDED RELEASE ORAL DAILY
Status: DISCONTINUED | OUTPATIENT
Start: 2024-08-12 | End: 2024-08-15 | Stop reason: HOSPADM

## 2024-08-11 RX ORDER — MECLIZINE HYDROCHLORIDE 25 MG/1
25 TABLET ORAL 3 TIMES DAILY PRN
Status: DISCONTINUED | OUTPATIENT
Start: 2024-08-11 | End: 2024-08-15 | Stop reason: HOSPADM

## 2024-08-11 RX ORDER — ONDANSETRON 2 MG/ML
4 INJECTION INTRAMUSCULAR; INTRAVENOUS EVERY 6 HOURS PRN
Status: DISCONTINUED | OUTPATIENT
Start: 2024-08-11 | End: 2024-08-15 | Stop reason: HOSPADM

## 2024-08-11 RX ORDER — GABAPENTIN 100 MG/1
100 CAPSULE ORAL 2 TIMES DAILY
Status: DISCONTINUED | OUTPATIENT
Start: 2024-08-11 | End: 2024-08-15 | Stop reason: HOSPADM

## 2024-08-11 RX ORDER — AMOXICILLIN 250 MG
1 CAPSULE ORAL 2 TIMES DAILY PRN
Status: DISCONTINUED | OUTPATIENT
Start: 2024-08-11 | End: 2024-08-15 | Stop reason: HOSPADM

## 2024-08-11 RX ORDER — CEFTRIAXONE 2 G/1
2 INJECTION, POWDER, FOR SOLUTION INTRAMUSCULAR; INTRAVENOUS ONCE
Status: COMPLETED | OUTPATIENT
Start: 2024-08-11 | End: 2024-08-11

## 2024-08-11 RX ORDER — AMPICILLIN AND SULBACTAM 2; 1 G/1; G/1
3 INJECTION, POWDER, FOR SOLUTION INTRAMUSCULAR; INTRAVENOUS EVERY 6 HOURS
Status: DISCONTINUED | OUTPATIENT
Start: 2024-08-11 | End: 2024-08-12

## 2024-08-11 RX ORDER — FUROSEMIDE 20 MG
20 TABLET ORAL
Status: DISCONTINUED | OUTPATIENT
Start: 2024-08-11 | End: 2024-08-15 | Stop reason: HOSPADM

## 2024-08-11 RX ORDER — ONDANSETRON 4 MG/1
4 TABLET, ORALLY DISINTEGRATING ORAL EVERY 6 HOURS PRN
Status: DISCONTINUED | OUTPATIENT
Start: 2024-08-11 | End: 2024-08-15 | Stop reason: HOSPADM

## 2024-08-11 RX ORDER — NICOTINE POLACRILEX 4 MG
15-30 LOZENGE BUCCAL
Status: DISCONTINUED | OUTPATIENT
Start: 2024-08-11 | End: 2024-08-15 | Stop reason: HOSPADM

## 2024-08-11 RX ORDER — LEVOTHYROXINE SODIUM 112 UG/1
112 TABLET ORAL DAILY
Status: DISCONTINUED | OUTPATIENT
Start: 2024-08-12 | End: 2024-08-15 | Stop reason: HOSPADM

## 2024-08-11 RX ORDER — DEXTROSE MONOHYDRATE 25 G/50ML
25-50 INJECTION, SOLUTION INTRAVENOUS
Status: DISCONTINUED | OUTPATIENT
Start: 2024-08-11 | End: 2024-08-15 | Stop reason: HOSPADM

## 2024-08-11 RX ORDER — AMOXICILLIN 250 MG
2 CAPSULE ORAL 2 TIMES DAILY PRN
Status: DISCONTINUED | OUTPATIENT
Start: 2024-08-11 | End: 2024-08-15 | Stop reason: HOSPADM

## 2024-08-11 RX ORDER — CALCIUM CARBONATE 500 MG/1
1000 TABLET, CHEWABLE ORAL 4 TIMES DAILY PRN
Status: DISCONTINUED | OUTPATIENT
Start: 2024-08-11 | End: 2024-08-15 | Stop reason: HOSPADM

## 2024-08-11 RX ADMIN — SODIUM CHLORIDE 1000 ML: 9 INJECTION, SOLUTION INTRAVENOUS at 14:24

## 2024-08-11 RX ADMIN — CEFTRIAXONE 2 G: 2 INJECTION, POWDER, FOR SOLUTION INTRAMUSCULAR; INTRAVENOUS at 14:24

## 2024-08-11 RX ADMIN — AMPICILLIN SODIUM AND SULBACTAM SODIUM 3 G: 2; 1 INJECTION, POWDER, FOR SOLUTION INTRAMUSCULAR; INTRAVENOUS at 18:23

## 2024-08-11 RX ADMIN — SODIUM CHLORIDE: 9 INJECTION, SOLUTION INTRAVENOUS at 18:23

## 2024-08-11 ASSESSMENT — COLUMBIA-SUICIDE SEVERITY RATING SCALE - C-SSRS
2. HAVE YOU ACTUALLY HAD ANY THOUGHTS OF KILLING YOURSELF IN THE PAST MONTH?: NO
1. IN THE PAST MONTH, HAVE YOU WISHED YOU WERE DEAD OR WISHED YOU COULD GO TO SLEEP AND NOT WAKE UP?: NO
6. HAVE YOU EVER DONE ANYTHING, STARTED TO DO ANYTHING, OR PREPARED TO DO ANYTHING TO END YOUR LIFE?: NO

## 2024-08-11 ASSESSMENT — ACTIVITIES OF DAILY LIVING (ADL)
ADLS_ACUITY_SCORE: 38
ADLS_ACUITY_SCORE: 45
ADLS_ACUITY_SCORE: 38
ADLS_ACUITY_SCORE: 45
ADLS_ACUITY_SCORE: 38

## 2024-08-11 NOTE — ED NOTES
Bed: ED08  Expected date:   Expected time:   Means of arrival:   Comments:  Suleiman 593- 82y, M, fall

## 2024-08-11 NOTE — ED PROVIDER NOTES
Emergency Department Note      History of Present Illness     Chief Complaint   Generalized Weakness      HPI   Mark Shultz is an 83 year old male with a history of bladder cancer as well as atrial fibrillation on Xarelto presenting alone for evaluation of generalized weakness.  He describes worsening weakness for some time but particularly in the last week.  He reports numerous episodes of falling to his knees.  He has never injured his head and is always been able to get up although it often takes quite some time (last night took 30 minutes to crawl from the bathroom to his bed).  He talked with his primary care provider last week as he thought it may be related to his diabetes with hyperglycemia but was told this was not the issue.  He went to urgent care reporting abdominal pain and cough that same day.  Workup there was concerning for UTI so he was treated with Duricef and Tessalon.  He continues to have both nonproductive cough and abdominal pain which has not significantly changed.  He does straight cath himself once daily and is currently awaiting urology follow-up after completing BCG treatments.  He denies fever, dysuria, or hematuria.    Independent Historian   As above    Review of External Notes   PC 7/9/24: note indicates history of diabetes, atrial fibrillation for which he is on Xarelto  I reviewed UC from 8/8/24: He was seen for abdominal pain and cough. A CT was performed (results below). He was discharged with Duricef antibiotic for his urine and tessalon for his cough.     CT Abdomen Pelvis WO 8/8/24  1.  No acute findings in the abdomen or pelvis.   2.  Subtle asymmetric wall thickening involving the right posterolateral urinary bladder wall. Additional layering stones or mineralized material in the urinary bladder which is markedly distended and contains a urachal remnant. Correlate for evidence of incomplete emptying and recommend attention on the scheduled upcoming cystoscopy reported in  the EMR in this patient with a history of bladder cancer.   3.  Prostatomegaly.     MIIC reviewed: last tetanus on 2020  Past Medical History     Medical History and Problem List   Past Medical History:   Diagnosis Date    Antiplatelet or antithrombotic long-term use     Arrhythmia     Arthritis     BPH (benign prostatic hyperplasia)     Brain tumor (H)     DIABETES 01/01/2002    Diabetes (H)     Hypercholesterolemia     Hypertension     Hypothyroid     Lumbago 01/01/1966    LVH (left ventricular hypertrophy)     Mumps     ABDIEL (obstructive sleep apnea)     Palpitations     Peripheral neuropathy        Medications   cefdinir (OMNICEF) 300 MG capsule  Cholecalciferol (VITAMIN D) 2000 UNITS CAPS  furosemide (LASIX) 20 MG tablet  gabapentin (NEURONTIN) 100 MG capsule  glipiZIDE (GLUCOTROL XL) 10 MG 24 hr tablet  HYDROmorphone (DILAUDID) 2 MG tablet  insulin glargine (LANTUS PEN) 100 UNIT/ML pen  insulin pen needle (31G X 8 MM) 31G X 8 MM miscellaneous  levothyroxine (SYNTHROID/LEVOTHROID) 112 MCG tablet  losartan (COZAAR) 50 MG tablet  meclizine (ANTIVERT) 25 MG tablet  metFORMIN (GLUCOPHAGE) 1000 MG tablet  metoprolol tartrate (LOPRESSOR) 50 MG tablet  piroxicam (FELDENE) 20 MG capsule  rivaroxaban ANTICOAGULANT (XARELTO) 10 MG TABS tablet  simvastatin (ZOCOR) 40 MG tablet  sitagliptin (JANUVIA) 100 MG tablet  sulfamethoxazole-trimethoprim (BACTRIM DS) 800-160 MG tablet  tamsulosin (FLOMAX) 0.4 MG capsule    Surgical History   Past Surgical History:   Procedure Laterality Date    CYSTOSCOPY      x2    CYSTOSCOPY, TRANSURETHRAL RESECTION (TUR) TUMOR BLADDER, COMBINED N/A 1/3/2024    Procedure: Cystoscopy, transurethral resection of the bladder tumor, large, greater than 5 cm;  Surgeon: Jeremiah Daniels MD;  Location:  OR     LAPAROSCOPY, SURGICAL; APPENDECTOMY  01/01/1995    HERNIA REPAIR      ZZC ANESTH,REPAIR UPPER ABD HERNIA NOS  01/01/1960       Physical Exam     Patient Vitals for the past 24 hrs:   BP  "Temp Temp src Pulse Resp SpO2 Height Weight   08/11/24 1332 -- -- -- -- -- 94 % -- --   08/11/24 1330 133/69 -- -- 51 -- -- -- --   08/11/24 1145 127/70 97.7  F (36.5  C) Temporal 63 17 94 % 1.727 m (5' 8\") 95.5 kg (210 lb 8.6 oz)     Physical Exam  General: Well-developed and well-nourished. Well appearing elderly  man. Cooperative.  Head:  Atraumatic.  Eyes:  Conjunctivae, lids, and sclerae are normal.  ENT:    Normal nose. Moist mucous membranes.  Neck:  Supple. Normal range of motion.  CV:  Regular rate and rhythm. Normal heart sounds with no murmurs, rubs, or gallops detected.  Resp:  No respiratory distress. Clear to auscultation bilaterally without decreased breath sounds, wheezing, rales, or rhonchi.  GI:  Soft. Non-distended. Non-tender.    MS:  Normal ROM.  Skin:  Warm. Non-diaphoretic. No pallor.  Abrasion on the dorsum of the right great toe.  Neuro:  Awake. A&Ox3.   Psych: Normal mood and affect. Normal speech.  Vitals reviewed.    Diagnostics     Lab Results   Labs Ordered and Resulted from Time of ED Arrival to Time of ED Departure   COMPREHENSIVE METABOLIC PANEL - Abnormal       Result Value    Sodium 132 (*)     Potassium 4.1      Carbon Dioxide (CO2) 23      Anion Gap 15      Urea Nitrogen 22.3      Creatinine 1.10      GFR Estimate 67      Calcium 9.0      Chloride 94 (*)     Glucose 258 (*)     Alkaline Phosphatase 66      AST 24      ALT 12      Protein Total 6.8      Albumin 4.0      Bilirubin Total 1.1     LACTIC ACID WHOLE BLOOD WITH 1X REPEAT IN 2 HR WHEN >2 - Abnormal    Lactic Acid, Initial 3.6 (*)    ROUTINE UA WITH MICROSCOPIC REFLEX TO CULTURE - Abnormal    Color Urine Orange (*)     Appearance Urine Slightly Cloudy (*)     Glucose Urine Negative      Bilirubin Urine Negative      Ketones Urine Negative      Specific Gravity Urine 1.017      Blood Urine Large (*)     pH Urine 5.5      Protein Albumin Urine 30 (*)     Urobilinogen Urine Normal      Nitrite Urine Negative      " Leukocyte Esterase Urine Large (*)     WBC Clumps Urine Present (*)     Mucus Urine Present (*)     RBC Urine >182 (*)     WBC Urine >182 (*)     Hyaline Casts Urine 3 (*)    CBC WITH PLATELETS AND DIFFERENTIAL - Abnormal    WBC Count 16.8 (*)     RBC Count 3.64 (*)     Hemoglobin 12.5 (*)     Hematocrit 36.0 (*)     MCV 99      MCH 34.3 (*)     MCHC 34.7      RDW 13.3      Platelet Count 191      NRBCs per 100 WBC 0      Absolute NRBCs 0.0     MANUAL DIFFERENTIAL - Abnormal    % Neutrophils 54      % Lymphocytes 34      % Monocytes 10      % Eosinophils 1      % Basophils 1      Absolute Neutrophils 9.1 (*)     Absolute Lymphocytes 5.7 (*)     Absolute Monocytes 1.7 (*)     Absolute Eosinophils 0.2      Absolute Basophils 0.2      RBC Morphology Confirmed RBC Indices      Platelet Assessment        Value: Automated Count Confirmed. Platelet morphology is normal.    Reactive Lymphocytes Present (*)    CK TOTAL - Normal         TROPONIN T, HIGH SENSITIVITY - Normal    Troponin T, High Sensitivity 21     INFLUENZA A/B, RSV, & SARS-COV2 PCR - Normal    Influenza A PCR Negative      Influenza B PCR Negative      RSV PCR Negative      SARS CoV2 PCR Negative     LACTIC ACID WHOLE BLOOD   BLOOD CULTURE   URINE CULTURE     Imaging   XR Chest 2 Views   Final Result   IMPRESSION: Heart size and pulmonary vascularity within normal limits. Hyperinflation of the lungs. Minimal atelectasis left lung base. Aortic calcification. Hypertrophic changes thoracic spine. Metallic density left anterior chest wall stable.        EKG  Indication: Weakness  Time: 1309  Rate 61 bpm. DC interval 282. QRS duration 82. QT/QTc 458/461.   Sinus rhythm with 1st degree AV block with premature atrial complexes  Left axis deviation  Septal infarct, age undetermined   No acute ST changes.  No significant changes as compared to prior, dated 1/2/24.    Independent Interpretation   I independently interpreted the chest x-ray and noted no pneumonia  or pneumothorax    ED Course      Medications Administered   Medications   cefTRIAXone (ROCEPHIN) 2 g vial to attach to  ml bag for ADULTS or NS 50 ml bag for PEDS (2 g Intravenous $New Bag 8/11/24 1424)   sodium chloride 0.9% BOLUS 1,000 mL (1,000 mLs Intravenous $New Bag 8/11/24 1424)     Discussion of Management   Admitting Hospitalist, Dr. Shaikh    ED Course   ED Course as of 08/11/24 1630   Sun Aug 11, 2024   1230 I obtained history and examined the patient as noted above.     1558 Patient re-evaluated. Feeling well. Understands plan for admission.    1612 I spoke with Dr. Shaikh, hospitalist, who accepts admission.        Additional Documentation  Established care system    Medical Decision Making / Diagnosis   The patient has signs of Severe Sepsis   The patient has signs of Severe Sepsis as evidenced by:    1. 2 SIRS criteria, AND  2. Suspected infection, AND   3. Organ dysfunction: Lactic Acidosis with value >2.0    Time severe sepsis diagnosis confirmed: 1310  08/11/24 as this was the time when Lactate resulted, and the level was > 2.0    3 Hour Severe Sepsis Bundle Completion:    1. Initial Lactic Acid Result:   Recent Labs   Lab Test 08/11/24  1310 01/07/24  2135   LACT 3.6* 1.2     2. Blood Cultures before Antibiotics: Yes  Note: Due to a national blood culture bottle shortage, reduced blood cultures may have been drawn on this patient.  3. Broad Spectrum Antibiotics Administered:  yes       Anti-infectives (From admission through now)      Start     Dose/Rate Route Frequency Ordered Stop    08/11/24 1345  cefTRIAXone (ROCEPHIN) 2 g vial to attach to  ml bag for ADULTS or NS 50 ml bag for PEDS         2 g  over 30 Minutes Intravenous ONCE 08/11/24 1344 08/11/24 1454            4. Is initial hypotension present?     No (IV fluid bolus NOT required). IV Fluid volume administered: 1,000 mL    Severe Sepsis reassessment:  1. Repeat Lactic Acid Level within 6 hours of time zero: pending at the  end of my shift  2. MAP>65 after initial IVF bolus, will continue to monitor fluid status and vital signs    I attest to having performed a repeat sepsis exam and assessment of perfusion at 1558 and the results demonstrate no change.    RK Flores is an 83 year old man with bladder cancer (straight caths 1/day at home) and atrial fibrillation on Xarelto presenting with generalized weakness worsening particularly in the last week resulting in numerous falls to his knees though without sustained injury outside of a toe abrasion.  He is currently on Duricef for UTI diagnosed at urgent care.  It should be noted he had a CT at that visit 3 days ago without acute findings outside of those related to his bladder.  On exam he is well-appearing.    Fortunately, his laboratory studies do not reveal evidence of kidney injury or significant electrolyte derangement.  However, he has a lactic acidosis of 3.6 and evidence of UTI (though UA is difficult to interpret in the setting of his bladder cancer).  Urine and blood cultures were obtained and he was empirically treated with Rocephin.  Leukocytosis of 16.8 is noted.  No obvious other infection is found including negative influenza, RSV, COVID-19, and chest x-ray.  I doubt a cardiac etiology for his weakness with a normal troponin and EKG which is reassuring without arrhythmia or ischemia.    He was treated with IV fluids and repeat lactate is pending.  He never had hypotension.  He does require hospitalization as he is too weak for discharge and attention can be made to his UTI with lactic acidosis and leukocytosis.  I updated him on findings and plan and answered all his questions.  He verbalized understanding and is amenable. I discussed his case with Dr. Shaikh, hospitalist, who accepts admission and has no further orders.    Disposition   The patient was admitted to the hospital.     Diagnosis     ICD-10-CM    1. Generalized weakness  R53.1       2. Abnormal urinalysis   R82.90       3. Lactic acidosis  E87.20       4. Leukocytosis, unspecified type  D72.829       5. Anticoagulated  Z79.01       6. Fall at home, initial encounter  W19.XXXA     Y92.009       7. Abrasion of toe of right foot, initial encounter  S90.414A          Scribe Disclosure:  I, Cielo Bipin, am serving as a scribe at 12:14 PM on 8/11/2024 to document services personally performed by Lindsay Beckman MD, based on my observations and the provider's statements to me.        Lindsay Beckman MD  08/14/24 1005       Lindsay Beckman MD  08/14/24 1007
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
## 2024-08-11 NOTE — PHARMACY-ADMISSION MEDICATION HISTORY
Pharmacist Admission Medication History    Admission medication history is complete. The information provided in this note is only as accurate as the sources available at the time of the update.    Information Source(s): Patient and CareEverywhere/SureScripts via in-person    Pertinent Information: None    Changes made to PTA medication list:  Added: None  Deleted: Lantus  Changed: None    Allergies reviewed with patient and updates made in EHR: yes    Medication History Completed By: Prince Simmons [facility] 8/11/2024 5:17 PM    PTA Med List   Medication Sig Last Dose    cefadroxil (DURICEF) 500 MG capsule Take 1,000 mg by mouth 2 times daily 8/11/2024 at x1    Cholecalciferol (VITAMIN D) 2000 UNITS CAPS Take 2,000 Units by mouth daily  8/11/2024 at AM    furosemide (LASIX) 20 MG tablet Take 20 mg by mouth 2 times daily 8/11/2024 at x1    gabapentin (NEURONTIN) 100 MG capsule Take 100 mg by mouth 2 times daily 8/11/2024 at x1    glipiZIDE (GLUCOTROL XL) 10 MG 24 hr tablet Take 20 mg by mouth daily  8/11/2024 at AM    levothyroxine (SYNTHROID/LEVOTHROID) 112 MCG tablet Take 112 mcg by mouth daily 8/11/2024 at AM    losartan (COZAAR) 50 MG tablet Take 50 mg by mouth daily 8/10/2024 at PM    meclizine (ANTIVERT) 25 MG tablet Take 25 mg by mouth 3 times daily as needed Past Month at ~2 wks ago    metFORMIN (GLUCOPHAGE) 1000 MG tablet Take 1,000 mg by mouth 2 times daily (with meals) 8/11/2024 at x1    metoprolol tartrate (LOPRESSOR) 50 MG tablet Take 50 mg by mouth 2 times daily 8/11/2024 at x1    piroxicam (FELDENE) 20 MG capsule Take 20 mg by mouth daily as needed Past Week    rivaroxaban ANTICOAGULANT (XARELTO) 20 MG TABS tablet Take 20 mg by mouth daily (with dinner) 8/10/2024 at PM    simvastatin (ZOCOR) 40 MG tablet Take 40 mg by mouth At Bedtime 8/10/2024 at PM    sitagliptin (JANUVIA) 100 MG tablet Take 100 mg by mouth every evening 8/10/2024 at PM    tamsulosin (FLOMAX) 0.4 MG capsule Take 0.4 mg by mouth 2 times  daily 8/11/2024 at x1

## 2024-08-11 NOTE — ED TRIAGE NOTES
Pt BIBA with increased weakness.  Pt states he has been noticing his legs and other muscles weakening for quite some time.  He states that he has had multiple falls without injuries, he is on xerelto.  He states that at this time he cannot stand without assistance.  Pt states that he has been talking to his doctor about this but they have not helped to diagnose why his body is getting weaker.       Triage Assessment (Adult)       Row Name 08/11/24 1147          Triage Assessment    Airway WDL WDL        Respiratory WDL    Respiratory WDL WDL        Cardiac WDL    Cardiac WDL WDL

## 2024-08-11 NOTE — ED NOTES
"[facility]  ED Nurse Handoff Report    ED Chief complaint: Generalized Weakness  . ED Diagnosis:   Final diagnoses:   Generalized weakness   Abnormal urinalysis   Lactic acidosis   Leukocytosis, unspecified type   Anticoagulated   Fall at home, initial encounter   Abrasion of toe of right foot, initial encounter       Allergies:   Allergies   Allergen Reactions    Shellfish Allergy Anaphylaxis    Iodinated Contrast Media Hives    Levaquin [Levofloxacin] Hallucination    Morphine And Codeine Nausea and Vomiting    Prednisone GI Disturbance     Unstable blood sugars    Tylenol W/Codeine [Acetaminophen-Codeine] Nausea and Vomiting     Weakness       Code Status: Full Code    Activity level - Baseline/Home:  independent.  Activity Level - Current:   assist of 2.   Lift room needed: No.   Bariatric: No   Needed: No   Isolation: No.   Infection: Not Applicable.     Respiratory status: Room air    Vital Signs (within 30 minutes):   Vitals:    08/11/24 1145 08/11/24 1330 08/11/24 1332   BP: 127/70 133/69    Pulse: 63 51    Resp: 17     Temp: 97.7  F (36.5  C)     TempSrc: Temporal     SpO2: 94%  94%   Weight: 95.5 kg (210 lb 8.6 oz)     Height: 1.727 m (5' 8\")         Cardiac Rhythm:  ,      Pain level:    Patient confused: No.   Patient Falls Risk: patient and family education.   Elimination Status: Has voided     Patient Report - Initial Complaint: generalized weakness.   Focused Assessment: Mark Shultz is an 83 year old male with a history of bladder cancer as well as atrial fibrillation on Xarelto presenting alone for evaluation of generalized weakness.  He describes worsening weakness for some time but particularly in the last week.  He reports numerous episodes of falling to his knees.  He has never injured his head and is always been able to get up although it often takes quite some time (last night took 30 minutes to crawl from the bathroom to his bed).  He talked with his primary " care provider last week as he thought it may be related to his diabetes with hyperglycemia but was told this was not the issue.  He went to urgent care reporting abdominal pain and cough that same day.  Workup there was concerning for UTI so he was treated with Duricef and Tessalon.  He continues to have both nonproductive cough and abdominal pain which has not significantly changed.  He does straight cath himself once daily and is currently awaiting urology follow-up after completing BCG treatments.  He denies fever, dysuria, or hematuria.       Abnormal Results:   Labs Ordered and Resulted from Time of ED Arrival to Time of ED Departure   COMPREHENSIVE METABOLIC PANEL - Abnormal       Result Value    Sodium 132 (*)     Potassium 4.1      Carbon Dioxide (CO2) 23      Anion Gap 15      Urea Nitrogen 22.3      Creatinine 1.10      GFR Estimate 67      Calcium 9.0      Chloride 94 (*)     Glucose 258 (*)     Alkaline Phosphatase 66      AST 24      ALT 12      Protein Total 6.8      Albumin 4.0      Bilirubin Total 1.1     LACTIC ACID WHOLE BLOOD WITH 1X REPEAT IN 2 HR WHEN >2 - Abnormal    Lactic Acid, Initial 3.6 (*)    ROUTINE UA WITH MICROSCOPIC REFLEX TO CULTURE - Abnormal    Color Urine Orange (*)     Appearance Urine Slightly Cloudy (*)     Glucose Urine Negative      Bilirubin Urine Negative      Ketones Urine Negative      Specific Gravity Urine 1.017      Blood Urine Large (*)     pH Urine 5.5      Protein Albumin Urine 30 (*)     Urobilinogen Urine Normal      Nitrite Urine Negative      Leukocyte Esterase Urine Large (*)     WBC Clumps Urine Present (*)     Mucus Urine Present (*)     RBC Urine >182 (*)     WBC Urine >182 (*)     Hyaline Casts Urine 3 (*)    CBC WITH PLATELETS AND DIFFERENTIAL - Abnormal    WBC Count 16.8 (*)     RBC Count 3.64 (*)     Hemoglobin 12.5 (*)     Hematocrit 36.0 (*)     MCV 99      MCH 34.3 (*)     MCHC 34.7      RDW 13.3      Platelet Count 191      NRBCs per 100 WBC 0       Absolute NRBCs 0.0     MANUAL DIFFERENTIAL - Abnormal    % Neutrophils 54      % Lymphocytes 34      % Monocytes 10      % Eosinophils 1      % Basophils 1      Absolute Neutrophils 9.1 (*)     Absolute Lymphocytes 5.7 (*)     Absolute Monocytes 1.7 (*)     Absolute Eosinophils 0.2      Absolute Basophils 0.2      RBC Morphology Confirmed RBC Indices      Platelet Assessment        Value: Automated Count Confirmed. Platelet morphology is normal.    Reactive Lymphocytes Present (*)    CK TOTAL - Normal         TROPONIN T, HIGH SENSITIVITY - Normal    Troponin T, High Sensitivity 21     INFLUENZA A/B, RSV, & SARS-COV2 PCR - Normal    Influenza A PCR Negative      Influenza B PCR Negative      RSV PCR Negative      SARS CoV2 PCR Negative     LACTIC ACID WHOLE BLOOD   BLOOD CULTURE   URINE CULTURE        XR Chest 2 Views   Final Result   IMPRESSION: Heart size and pulmonary vascularity within normal limits. Hyperinflation of the lungs. Minimal atelectasis left lung base. Aortic calcification. Hypertrophic changes thoracic spine. Metallic density left anterior chest wall stable.          Treatments provided: see MAR  Family Comments: N/A  OBS brochure/video discussed/provided to patient:  N/A  ED Medications:   Medications   cefTRIAXone (ROCEPHIN) 2 g vial to attach to  ml bag for ADULTS or NS 50 ml bag for PEDS (2 g Intravenous $New Bag 8/11/24 1424)   sodium chloride 0.9% BOLUS 1,000 mL (1,000 mLs Intravenous $New Bag 8/11/24 1424)       Drips infusing:  No  For the majority of the shift this patient was Green.   Interventions performed were N/A.    Sepsis treatment initiated: No    Cares/treatment/interventions/medications to be completed following ED care: see inpatient admit orders.     ED Nurse Name: Rosalia Iyer RN  4:47 PM

## 2024-08-11 NOTE — CARE PLAN
"[facility]    ED Boarding Nurse Handoff Addendum Report:    Date/time: 8/11/2024, 6:30 PM    Activity Level: Assist of 2 with jose l steady    Fall Risk: Yes:  nonskid shoes/slippers when out of bed, arm band in place, patient and family education, and activity supervised    Active Infusions: Normal saline    Current Meds Due: Yes    Current care needs: Blood s    Oxygen requirements (liters/min and/or FiO2): None    Respiratory status: Room air    Vital signs (within last 30 minutes):    Vitals:    08/11/24 1145 08/11/24 1330 08/11/24 1332 08/11/24 1704   BP: 127/70 133/69  (!) 141/72   Pulse: 63 51  65   Resp: 17   16   Temp: 97.7  F (36.5  C)      TempSrc: Temporal      SpO2: 94%  94% 94%   Weight: 95.5 kg (210 lb 8.6 oz)      Height: 1.727 m (5' 8\")        Focused assessment within last 30 minutes:    A/O x 4. VSS. A x 2 Jose L steady. C/o back pain 4/10 declines intervention. Refused insulin.    ED Boarding Nurse name: Leola Ackerman RN   "

## 2024-08-12 ENCOUNTER — APPOINTMENT (OUTPATIENT)
Dept: PHYSICAL THERAPY | Facility: CLINIC | Age: 84
DRG: 872 | End: 2024-08-12
Attending: INTERNAL MEDICINE
Payer: MEDICARE

## 2024-08-12 ENCOUNTER — TELEPHONE (OUTPATIENT)
Dept: UROLOGY | Facility: CLINIC | Age: 84
End: 2024-08-12
Payer: MEDICARE

## 2024-08-12 LAB
ANION GAP SERPL CALCULATED.3IONS-SCNC: 13 MMOL/L (ref 7–15)
ATRIAL RATE - MUSE: 61 BPM
BASOPHILS # BLD MANUAL: 0.2 10E3/UL (ref 0–0.2)
BASOPHILS NFR BLD MANUAL: 1 %
BUN SERPL-MCNC: 17.3 MG/DL (ref 8–23)
CALCIUM SERPL-MCNC: 8.5 MG/DL (ref 8.8–10.4)
CHLORIDE SERPL-SCNC: 101 MMOL/L (ref 98–107)
CREAT SERPL-MCNC: 0.94 MG/DL (ref 0.67–1.17)
DIASTOLIC BLOOD PRESSURE - MUSE: NORMAL MMHG
EGFRCR SERPLBLD CKD-EPI 2021: 80 ML/MIN/1.73M2
EOSINOPHIL # BLD MANUAL: 0.2 10E3/UL (ref 0–0.7)
EOSINOPHIL NFR BLD MANUAL: 1 %
ERYTHROCYTE [DISTWIDTH] IN BLOOD BY AUTOMATED COUNT: 13.3 % (ref 10–15)
ERYTHROCYTE [DISTWIDTH] IN BLOOD BY AUTOMATED COUNT: 13.5 % (ref 10–15)
GLUCOSE BLDC GLUCOMTR-MCNC: 215 MG/DL (ref 70–99)
GLUCOSE BLDC GLUCOMTR-MCNC: 235 MG/DL (ref 70–99)
GLUCOSE BLDC GLUCOMTR-MCNC: 254 MG/DL (ref 70–99)
GLUCOSE BLDC GLUCOMTR-MCNC: 256 MG/DL (ref 70–99)
GLUCOSE BLDC GLUCOMTR-MCNC: 334 MG/DL (ref 70–99)
GLUCOSE SERPL-MCNC: 237 MG/DL (ref 70–99)
HCO3 SERPL-SCNC: 22 MMOL/L (ref 22–29)
HCT VFR BLD AUTO: 34.8 % (ref 40–53)
HCT VFR BLD AUTO: 36 % (ref 40–53)
HGB BLD-MCNC: 12 G/DL (ref 13.3–17.7)
HGB BLD-MCNC: 12.5 G/DL (ref 13.3–17.7)
INTERPRETATION ECG - MUSE: NORMAL
LYMPHOCYTES # BLD MANUAL: 5.7 10E3/UL (ref 0.8–5.3)
LYMPHOCYTES NFR BLD MANUAL: 34 %
MCH RBC QN AUTO: 34 PG (ref 26.5–33)
MCH RBC QN AUTO: 34.3 PG (ref 26.5–33)
MCHC RBC AUTO-ENTMCNC: 34.5 G/DL (ref 31.5–36.5)
MCHC RBC AUTO-ENTMCNC: 34.7 G/DL (ref 31.5–36.5)
MCV RBC AUTO: 99 FL (ref 78–100)
MCV RBC AUTO: 99 FL (ref 78–100)
MONOCYTES # BLD MANUAL: 1.7 10E3/UL (ref 0–1.3)
MONOCYTES NFR BLD MANUAL: 10 %
NEUTROPHILS # BLD MANUAL: 9.1 10E3/UL (ref 1.6–8.3)
NEUTROPHILS NFR BLD MANUAL: 54 %
NRBC # BLD AUTO: 0 10E3/UL
NRBC BLD AUTO-RTO: 0 /100
P AXIS - MUSE: 88 DEGREES
PATH REV: ABNORMAL
PLAT MORPH BLD: ABNORMAL
PLATELET # BLD AUTO: 178 10E3/UL (ref 150–450)
PLATELET # BLD AUTO: 191 10E3/UL (ref 150–450)
POTASSIUM SERPL-SCNC: 3.8 MMOL/L (ref 3.4–5.3)
PR INTERVAL - MUSE: 282 MS
QRS DURATION - MUSE: 82 MS
QT - MUSE: 458 MS
QTC - MUSE: 461 MS
R AXIS - MUSE: -31 DEGREES
RBC # BLD AUTO: 3.53 10E6/UL (ref 4.4–5.9)
RBC # BLD AUTO: 3.64 10E6/UL (ref 4.4–5.9)
RBC MORPH BLD: ABNORMAL
SODIUM SERPL-SCNC: 136 MMOL/L (ref 135–145)
SYSTOLIC BLOOD PRESSURE - MUSE: NORMAL MMHG
T AXIS - MUSE: -3 DEGREES
VARIANT LYMPHS BLD QL SMEAR: PRESENT
VENTRICULAR RATE- MUSE: 61 BPM
WBC # BLD AUTO: 13.6 10E3/UL (ref 4–11)
WBC # BLD AUTO: 16.8 10E3/UL (ref 4–11)

## 2024-08-12 PROCEDURE — 97116 GAIT TRAINING THERAPY: CPT | Mod: GP

## 2024-08-12 PROCEDURE — 36415 COLL VENOUS BLD VENIPUNCTURE: CPT | Performed by: INTERNAL MEDICINE

## 2024-08-12 PROCEDURE — 120N000001 HC R&B MED SURG/OB

## 2024-08-12 PROCEDURE — 258N000003 HC RX IP 258 OP 636: Performed by: INTERNAL MEDICINE

## 2024-08-12 PROCEDURE — 250N000011 HC RX IP 250 OP 636: Performed by: INTERNAL MEDICINE

## 2024-08-12 PROCEDURE — 82565 ASSAY OF CREATININE: CPT | Performed by: INTERNAL MEDICINE

## 2024-08-12 PROCEDURE — 97530 THERAPEUTIC ACTIVITIES: CPT | Mod: GP

## 2024-08-12 PROCEDURE — 99232 SBSQ HOSP IP/OBS MODERATE 35: CPT | Performed by: INTERNAL MEDICINE

## 2024-08-12 PROCEDURE — 85014 HEMATOCRIT: CPT | Performed by: INTERNAL MEDICINE

## 2024-08-12 PROCEDURE — 250N000013 HC RX MED GY IP 250 OP 250 PS 637: Performed by: INTERNAL MEDICINE

## 2024-08-12 PROCEDURE — 97161 PT EVAL LOW COMPLEX 20 MIN: CPT | Mod: GP

## 2024-08-12 PROCEDURE — 82374 ASSAY BLOOD CARBON DIOXIDE: CPT | Performed by: INTERNAL MEDICINE

## 2024-08-12 RX ORDER — NITROFURANTOIN 25; 75 MG/1; MG/1
100 CAPSULE ORAL EVERY 12 HOURS SCHEDULED
Status: DISCONTINUED | OUTPATIENT
Start: 2024-08-12 | End: 2024-08-15 | Stop reason: HOSPADM

## 2024-08-12 RX ADMIN — ACETAMINOPHEN 650 MG: 325 TABLET, FILM COATED ORAL at 20:00

## 2024-08-12 RX ADMIN — FUROSEMIDE 20 MG: 20 TABLET ORAL at 00:16

## 2024-08-12 RX ADMIN — METFORMIN HYDROCHLORIDE 1000 MG: 500 TABLET ORAL at 14:02

## 2024-08-12 RX ADMIN — METFORMIN HYDROCHLORIDE 1000 MG: 500 TABLET ORAL at 18:27

## 2024-08-12 RX ADMIN — SITAGLIPTIN 100 MG: 50 TABLET, FILM COATED ORAL at 19:53

## 2024-08-12 RX ADMIN — GABAPENTIN 100 MG: 100 CAPSULE ORAL at 08:02

## 2024-08-12 RX ADMIN — RIVAROXABAN 20 MG: 10 TABLET, FILM COATED ORAL at 00:16

## 2024-08-12 RX ADMIN — LOSARTAN POTASSIUM 50 MG: 50 TABLET, FILM COATED ORAL at 00:16

## 2024-08-12 RX ADMIN — AMPICILLIN SODIUM AND SULBACTAM SODIUM 3 G: 2; 1 INJECTION, POWDER, FOR SOLUTION INTRAMUSCULAR; INTRAVENOUS at 00:19

## 2024-08-12 RX ADMIN — SODIUM CHLORIDE: 9 INJECTION, SOLUTION INTRAVENOUS at 05:14

## 2024-08-12 RX ADMIN — GLIPIZIDE 20 MG: 10 TABLET, EXTENDED RELEASE ORAL at 08:02

## 2024-08-12 RX ADMIN — TAMSULOSIN HYDROCHLORIDE 0.4 MG: 0.4 CAPSULE ORAL at 19:54

## 2024-08-12 RX ADMIN — LOSARTAN POTASSIUM 50 MG: 50 TABLET, FILM COATED ORAL at 19:54

## 2024-08-12 RX ADMIN — METOPROLOL TARTRATE 50 MG: 25 TABLET, FILM COATED ORAL at 08:02

## 2024-08-12 RX ADMIN — AMPICILLIN SODIUM AND SULBACTAM SODIUM 3 G: 2; 1 INJECTION, POWDER, FOR SOLUTION INTRAMUSCULAR; INTRAVENOUS at 05:13

## 2024-08-12 RX ADMIN — LEVOTHYROXINE SODIUM 112 MCG: 0.11 TABLET ORAL at 08:02

## 2024-08-12 RX ADMIN — TAMSULOSIN HYDROCHLORIDE 0.4 MG: 0.4 CAPSULE ORAL at 08:02

## 2024-08-12 RX ADMIN — TAMSULOSIN HYDROCHLORIDE 0.4 MG: 0.4 CAPSULE ORAL at 00:16

## 2024-08-12 RX ADMIN — GABAPENTIN 100 MG: 100 CAPSULE ORAL at 00:15

## 2024-08-12 RX ADMIN — NITROFURANTOIN MONOHYDRATE/MACROCRYSTALS 100 MG: 75; 25 CAPSULE ORAL at 08:02

## 2024-08-12 RX ADMIN — RIVAROXABAN 20 MG: 10 TABLET, FILM COATED ORAL at 16:25

## 2024-08-12 RX ADMIN — GABAPENTIN 100 MG: 100 CAPSULE ORAL at 19:54

## 2024-08-12 RX ADMIN — FUROSEMIDE 20 MG: 20 TABLET ORAL at 16:25

## 2024-08-12 RX ADMIN — METOPROLOL TARTRATE 50 MG: 25 TABLET, FILM COATED ORAL at 00:16

## 2024-08-12 RX ADMIN — FUROSEMIDE 20 MG: 20 TABLET ORAL at 08:02

## 2024-08-12 RX ADMIN — METOPROLOL TARTRATE 25 MG: 25 TABLET, FILM COATED ORAL at 19:53

## 2024-08-12 RX ADMIN — NITROFURANTOIN MONOHYDRATE/MACROCRYSTALS 100 MG: 75; 25 CAPSULE ORAL at 19:54

## 2024-08-12 ASSESSMENT — ACTIVITIES OF DAILY LIVING (ADL)
ADLS_ACUITY_SCORE: 25
ADLS_ACUITY_SCORE: 28
ADLS_ACUITY_SCORE: 25

## 2024-08-12 NOTE — TELEPHONE ENCOUNTER
[facility] Call Center    Phone Message    May a detailed message be left on voicemail: yes     Reason for Call: Patient is wanting to reschedule cystoscopy (scheduled for 08/14) due to currently being admitted. Please call patient back to reschedule.     Action Taken: Other: UB - Urology    Travel Screening: Not Applicable     Date of Service:

## 2024-08-12 NOTE — PLAN OF CARE
" Goal Outcome Evaluation:      Plan of Care Reviewed With: patient    Overall Patient Progress: no changeOverall Patient Progress: no change    Outcome Evaluation: A&O x4, on RA,  mL/hr infusing, denies pain, telemetry monitor SR 67 BPM per tele tech, BG ACHS, pt reports self catheterization orders/supplies obtained, continuous pulse ox in place, refused CPAP states he hasn't used it in years HCP notified    Problem: Adult Inpatient Plan of Care  Goal: Plan of Care Review  Description: The Plan of Care Review/Shift note should be completed every shift.  The Outcome Evaluation is a brief statement about your assessment that the patient is improving, declining, or no change.  This information will be displayed automatically on your shift  note.  Outcome: Progressing  Flowsheets (Taken 8/12/2024 0009)  Outcome Evaluation: A&O x4, on RA,  mL/hr infusing, denies pain, telemetry monitor SR 67 BPM per tele tech, BG ACHS, pt reports self catheterization orders/supplies obtained, continuous pulse ox in place, refused CPAP states he hasn't used it in years HCP notified  Plan of Care Reviewed With: patient  Overall Patient Progress: no change  Goal: Patient-Specific Goal (Individualized)  Description: You can add care plan individualizations to a care plan. Examples of Individualization might be:  \"Parent requests to be called daily at 9am for status\", \"I have a hard time hearing out of my right ear\", or \"Do not touch me to wake me up as it startles  me\".  Outcome: Progressing  Goal: Absence of Hospital-Acquired Illness or Injury  Outcome: Progressing  Intervention: Identify and Manage Fall Risk  Recent Flowsheet Documentation  Taken 8/11/2024 2127 by La Nena Jeffers, RN  Safety Promotion/Fall Prevention: safety round/check completed  Intervention: Prevent Skin Injury  Recent Flowsheet Documentation  Taken 8/11/2024 2127 by La Nena Jeffers, RN  Body Position: position changed independently  Skin Protection: " adhesive use limited  Device Skin Pressure Protection: absorbent pad utilized/changed  Intervention: Prevent Infection  Recent Flowsheet Documentation  Taken 8/11/2024 2127 by La Nena Jeffers RN  Infection Prevention: hand hygiene promoted  Goal: Optimal Comfort and Wellbeing  Outcome: Progressing  Intervention: Monitor Pain and Promote Comfort  Recent Flowsheet Documentation  Taken 8/11/2024 2127 by La Nena Jeffers RN  Pain Management Interventions: rest  Goal: Readiness for Transition of Care  Outcome: Progressing     Problem: Fall Injury Risk  Goal: Absence of Fall and Fall-Related Injury  Outcome: Progressing  Intervention: Identify and Manage Contributors  Recent Flowsheet Documentation  Taken 8/11/2024 2127 by La Nena Jeffers RN  Medication Review/Management: medications reviewed  Intervention: Promote Injury-Free Environment  Recent Flowsheet Documentation  Taken 8/11/2024 2127 by La Nena Jeffers RN  Safety Promotion/Fall Prevention: safety round/check completed     Problem: Fatigue  Goal: Improved Activity Tolerance  Outcome: Progressing  Intervention: Promote Improved Energy  Recent Flowsheet Documentation  Taken 8/11/2024 2127 by La Nena Jeffers RN  Activity Management: back to bed

## 2024-08-12 NOTE — PROGRESS NOTES
"   08/12/24 5572   Appointment Info   Signing Clinician's Name / Credentials (PT) Idris Oneal DPT   Living Environment   People in Home spouse   Current Living Arrangements house   Home Accessibility wheelchair accessible   Living Environment Comments Pt lives in house with spouse, garage has ramp to enter. Full flight with railing to basement, laundry in basement. Pt reports spouse needs help with ADLs and IADLs, unable to provide pt assistance.   Self-Care   Usual Activity Tolerance moderate   Current Activity Tolerance fair   Equipment Currently Used at Home walker, rolling;walker, standard   Fall history within last six months yes   Activity/Exercise/Self-Care Comment Pt reports no falls in past 6 monts (unclear, charted pt had 2 when asked from staff yesterday). Per chart, had 1 fall prior to admit. Pt is IND with functional mobility, uses FWW and/or 4WW for ambulation within home, SEC outside of home.   General Information   Onset of Illness/Injury or Date of Surgery 08/11/24   Referring Physician Abe Shaikh MD   Pertinent History of Current Problem (include personal factors and/or comorbidities that impact the POC) Pt is 82 yo male who, per chart, \" atrial fib, hypertension, type 2 diabetes mellitus with peripheral neuropathy, hypercholesteremia, hypothyroidism, ABDIEL, BPH, OA, benign cranial nerve tumor, multiple falls, bladder tumor s/p transurethral bladder tumor resection 1/3/2024 who came to [facility] ER on 8/11/24 for concerns about generalized weakness and had a fall when he got up from a bench and the concrete was sloped down. \"   Existing Precautions/Restrictions fall   Cognition   Affect/Mental Status (Cognition) WFL   Orientation Status (Cognition) oriented x 4   Follows Commands (Cognition) WFL   Pain Assessment   Patient Currently in Pain   (abdominal area pain, LB pain, knee pain. Hx of chronic pain)   Integumentary/Edema   Integumentary/Edema no deficits were " identifed   Posture    Posture Forward head position   Range of Motion (ROM)   Range of Motion ROM is WFL   Strength (Manual Muscle Testing)   Strength (Manual Muscle Testing) Deficits observed during functional mobility   Bed Mobility   Comment, (Bed Mobility) not assessed on this date.   Transfers   Comment, (Transfers) STS with FWW and Kelly.   Gait/Stairs (Locomotion)   Comment, (Gait/Stairs) 10' with FWW and CGA, slow gait speed, step through patter, short B step length, anterior lean noted with increased UE use on walker.   Balance   Balance Comments walker needed for OOB mobility, fatigues quickly, 1 time R knee buckled.   Sensory Examination   Sensory Perception Comments diminished light touch distal B LEs per screen   Clinical Impression   Criteria for Skilled Therapeutic Intervention Yes, treatment indicated   PT Diagnosis (PT) impaired functional mobility.   Influenced by the following impairments pain, decreased strength and activity tolerance, impaired balance.   Functional limitations due to impairments difficulty with bed mobility, transfers, and ambulation.   Clinical Presentation (PT Evaluation Complexity) stable   Clinical Presentation Rationale clinical judgment   Clinical Decision Making (Complexity) low complexity   Planned Therapy Interventions (PT) balance training;bed mobility training;gait training;home exercise program;neuromuscular re-education;ROM (range of motion);strengthening;stretching;stair training;transfer training;progressive activity/exercise   Risk & Benefits of therapy have been explained evaluation/treatment results reviewed;care plan/treatment goals reviewed;risks/benefits reviewed;current/potential barriers reviewed;participants voiced agreement with care plan;participants included;patient   PT Total Evaluation Time   PT Eval, Low Complexity Minutes (07381) 10   Physical Therapy Goals   PT Frequency Daily   PT Predicted Duration/Target Date for Goal Attainment 08/19/24   PT  Goals Bed Mobility;Transfers;Gait;Stairs   PT: Bed Mobility Independent;Supine to/from sit   PT: Transfers Modified independent;Sit to/from stand;Assistive device   PT: Gait Modified independent;Rolling walker;100 feet   PT: Stairs Greater than 10 stairs;Supervision/stand-by assist;Rail on right   Interventions   Interventions Quick Adds Therapeutic Activity;Gait Training   Therapeutic Activity   Therapeutic Activities: dynamic activities to improve functional performance Minutes (26740) 15   Symptoms Noted During/After Treatment Fatigue   Treatment Detail/Skilled Intervention Pt sitting in recliner upon PT arrival. Pt on RA, SpO2 spot checked during session, at 95% prior to mobility. Pt performed STS x4 with FWW and Kelly, progressed to close SBA. Cues on UE placement, pt trialed 1x pushing up from chair, otherwise pushing up from walker and states he feels more comfortable with this. Pt educated on safety and not recommending pushing up from walker due to instability of walker and increased risk of LOB. Pt has preference to push up from walker. Pt requesting to use bathroom. Needing Ax1 for donning/doffing brief. SBA with FWW for STS from toilet. Pt sitting in recliner at end of session, alarm on, all needs within reach. SpO2 in low- to mid-90s at end of session. Educated pt on 3 walks/day to increase overall IND with functional mob.   Gait Training   Gait Training Minutes (04572) 8   Symptoms Noted During/After Treatment (Gait Training) fatigue   Treatment Detail/Skilled Intervention Pt ambulated ~40' + 15' with FWW and CGA. Pt with 1 R knee buckle during gait bout when going over door threshold. Pt cued for upright posture. Pt educated on proper fitting of walker, walker height adjusted for pt.   PT Discharge Planning   PT Plan assess bed mob, repeat STS for IND and strength, gait distance, trial stairs.   PT Discharge Recommendation (DC Rec) Transitional Care Facility   PT Rationale for DC Rec Pt at baseline is  IND with functional mobility, using recently 4WW or FWW inside home, SEC outside home. 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 Currently pt is below baseline, limited by weakness and impaired balance. Needing Ax1 and FWW for safe mobility on this date, Recommending TCF. Pending pt's progress with IP PT, may be able to return home if he can demonstrate stair navigation and functional distances with SBA or less using walker.   PT Brief overview of current status Ax1 with FWW   Total Session Time   Timed Code Treatment Minutes 23   Total Session Time (sum of timed and untimed services) 33

## 2024-08-12 NOTE — PROGRESS NOTES
[facility]    Medicine Progress Note - Hospitalist Service    Date of Admission:  8/11/2024    Primary Care Physician   Abe Eng  CONSULTANTS:  physical therapy and occupational therapy     Assessment & Plan   83 year old gentleman with atrial fib, hypertension, type 2 diabetes mellitus with peripheral neuropathy, hypercholesteremia, hypothyroidism, ABDIEL, BPH, OA, benign cranial nerve tumor, multiple falls, bladder tumor s/p transurethral bladder tumor resection 1/3/2024 who came to [facility] ER on 8/11/24 for concerns about generalized weakness and had a fall when he got up from a bench and the concrete was sloped down.      Patient was recently seen in the outpatient setting on 8/8/2024 for similar concerns that he presented with today.  He was also complaining of abdominal pain at that visit.  CT scan of the abdomen pelvis was performed which showed no acute findings, though there was some subtle asymmetric wall thickening involving the right posterior lateral urinary bladder wall.  Patient does have known history of bladder cancer and underwent tumor resection in 1/24.  He also has urine retention requiring intermittent straight catheterization.  The patient was prescribed Duricef for presumed UTI/pyelonephritis.  He is taken 3 doses of the medication with no significant improvement of his symptoms .     Presentation in the ER, vital signs notable for systolic blood pressure 130, heart rate near 65, no fever, and no hypoxia.  Pertinent labs in the ER today included abnormal urinalysis with pyuria and hematuria, with large leukocyte esterase present.  Urine culture obtained and pending.  Peripheral white blood cell count near 17.  PCR testing for COVID influenza and RSV negative.  Complete metabolic panel rather unremarkable..  Troponin within normal limits.  CK normal.  Lactic acid elevated at 3.6.  This has not yet been rechecked.  Imaging today included a chest  x-ray showing no acute findings.  Patient was placed on rocephin empirically and admitted to the hospital. He has a chronic agarwal in place.           1.  Sepsis (leukocytosis, lactic acidosis) due to complicated UTI due to straight caths and uncontrolled diabetes   Patient presented with a fall and weakness. He was  non-toxic appearing.  Treat with Unasyn (history of Enterococcus in previous urine culture) after getting a dose of rocephin in the emergency room.  Both blood and urine cultures are pending and negative to date.  Patient was given IV fluids.  His lactic acid went from 3.6 down to 2.3 and does not appear all that ill.  Previous urine culture 2/24 grew Enterococcus and Aerococcus; Enterococcus was pansensitive to all antibiotics tested (including penicillin).  Will switch the patient to oral Macrobid while awaiting culture results.     2.  Chronic urine retention requiring intermittent straight catheterization  Patient has a Agarwal in place.  Per report straight caths.  And is changed monthly per his report.         3.  History of bladder cancer, s/p resection 1/24  Follows with Dr. Daniels of urology.  Received BCG treatments. Reports he is cancer free and has frequent monitoring cystoscopy, next scheduled on 8/14/2024.     4.  Diabetes mellitus type II with peripheral neuropathy of lower extremities  Insulin sliding scale as needed which he is refusing.  Patient will continue on his home Glucotrol, Januvia, and will Riyad his metformin.  His blood sugars are uncontrolled and this is likely contributing factor to his UTI. Hemoglobin A1c 7.6 as of 8/11/24.     5.  Atrial fibrillation, sinus bradycardia, sinus pause 2 seconds  Chronically treated with Xarelto for anticoagulation, is on a beta-blocker for rate control patient was followed on telemetry and will be decreasing his beta-blocker dose due to bradycardia down to 42 along with a sinus pause.  This could be contributing factor to his weakness and  falls.     6.  Acute on chronic generalized weakness/deconditioning resulting in frequent falls, polypharmacy  Patient with a non-focal neuro exam but found to likely have a UTI also polypharmacy could be playing a role.  PT and OT have been consulted.  Patient may require TCU placement as he resides in assisted living.  He does use a 4 wheeled walker.  Could consider an MRI of the lumbar spine if patient fails to improve with treatment of UTI or demonstrates focal LE weakness on exam given his history of his bladder ca.      7.  Hypothyroidism   TSH was normal at 2.67    8.  History of benign brain tumor  Followed at outside institution; scant details on this currently. Has had follow-up brain imaging showing no growth. Untreated         4Ms:  Polypharmacy: Medications reviewed.  At this point patient is on a statin which I would recommend getting rid of as the risk outweighs the benefits especially in the setting of weakness and falls.  His beta-blocker dose will be cut in dose as well as his bradycardia/sinus pauses could be playing a role  Mentation:  Capacity seems intact make his own medical decision  Social support: Per report lives in assisted living with his wife  Mobility: Uses a 4 wheeled walker  What is importmant: N/A      Discussed plan of care with nursing in the emergency room      Diet: Combination Diet Regular Diet Adult    DVT Prophylaxis: DOAC  Pemberton Catheter: Not present  Lines: None     Cardiac Monitoring: None    RESTRAINTS: not indicated   Code Status: Full Code        Length of stay: Anticipate greater than 2 midnight hospitalization for evaluation and treatment of sepsis, UTI, may need TCu     This document was created using voice recognition technology.  Please excuse any typographical errors that may have occurred.  Please call with any questions.         # Hypocalcemia: Lowest Ca = 8.5 mg/dL in last 2 days, will monitor and replace as appropriate      # Drug Induced Coagulation Defect:  "home medication list includes an anticoagulant medication    # Hypertension: Noted on problem list        # DMII: A1C = 7.6 % (Ref range: <5.7 %) within past 6 months    # Obesity: Estimated body mass index is 32.01 kg/m  as calculated from the following:    Height as of this encounter: 1.727 m (5' 8\").    Weight as of this encounter: 95.5 kg (210 lb 8.6 oz).                    Disposition Plan      Expected Discharge Date: 08/14/2024                  Barrier to discharge: Improved infection, improved weakness, not a falls risk, may require TCU, awaiting therapy to see    Medically Ready for Discharge: Anticipated in 2-4 Days, may be ready as early as tomorrow         Edwar Davila MD  Hospitalist Service  [facility]  Securely message with [employer] (more info)  Text page via [employer] Paging/Directory   ______________________________________________________________________    Interval History   Patient is new to me.  Patient states that he has been feeling more weak and had a fall when he did not take his 4 wheeled walker with him for a walk when he sat down on a bench got up and did not realize that the concrete below him was slanted away leading him to fall.  Patient overnight had bradycardia down to 42 and also had a 2-second pause on telemetry.  He currently has a Pemberton in place but per reports intermittently caths himself.  Therapy has yet to see.      ROS: A comprehensive review of systems was negative except for items noted in the HPI.  Patient currently denies any fever, chills, sweats, nausea, vomiting, diarrhea, shortness of breath, or chest pain.      Physical Exam   Vital Signs: Temp: 97.3  F (36.3  C) Temp src: Temporal BP: (!) 139/110 Pulse: 62   Resp: 16 SpO2: 94 % O2 Device: None (Room air)    Weight: 210 lbs 8.63 oz      General appearance: Patient is alert and oriented x3, no apparent distress, pleasant and conversing normally, speaking in full sentences, appears but is elderly " and frail stated age  HEENT:    Mucous membranes are moist  RESPIRATORY: Clear to auscultation bilateral, good air movement  CARDIOVASCULAR: Currently sounds regular rate and rhythm if not a little bradycardic, normal S1/S2, no murmurs    GASTROINTESTINAL: Non-distended, non-tender, soft, bowel sounds present throughout, no mass or hepatosplenomegaly  NEUROLOGIC:  Cranial nerves II-XII intact, without any focal deficits, strength 5/5 throughout  EXTREMITIES:  Moves all extremities, no clubbing, cyanosis, nor edema  :  Pemberton currently present   with clear urine      Data     I have personally reviewed the following data over the past 24 hrs:    13.6 (H)  \   12.0 (L)   / 178     136 101 17.3 /  334 (H)   3.8 22 0.94 \     ALT: N/A AST: N/A AP: N/A TBILI: N/A   ALB: N/A TOT PROTEIN: N/A LIPASE: N/A     Trop: N/A BNP: N/A     TSH: N/A T4: N/A A1C: N/A     Procal: N/A CRP: N/A Lactic Acid: 2.3 (H)         Imaging:   Results for orders placed or performed during the hospital encounter of 08/11/24   XR Chest 2 Views    Narrative    EXAM: XR CHEST 2 VIEWS  LOCATION: [facility]  DATE: 8/11/2024    INDICATION: Cough, weakness.  COMPARISON: 8/8/2024      Impression    IMPRESSION: Heart size and pulmonary vascularity within normal limits. Hyperinflation of the lungs. Minimal atelectasis left lung base. Aortic calcification. Hypertrophic changes thoracic spine. Metallic density left anterior chest wall stable.     Procedures: none   I have personally have reviewed the patient's most up to date radiologic exams, EKG/tele, labs, orders, and medications myself

## 2024-08-12 NOTE — PROVIDER NOTIFICATION
Nathalie paged Dr. Davial    12:36 FYI Pt refusing insulin, blood glucose 334.     MD aware, no new orders.

## 2024-08-12 NOTE — PLAN OF CARE
Goal Outcome Evaluation:    A/Ox4. Denies pain. Tele: SB, HR dropped down to 42, asymptotic, pt appears sleeping, MD aware. Gloria nous fluids infusing. Bladder scan and self catheterization as needed.       Plan of Care Reviewed With: patient    Overall Patient Progress: no changeOverall Patient Progress: no change    Outcome Evaluation: RA, MANUEL. Continuous fluids. Self catherization as needed.      Problem: Adult Inpatient Plan of Care  Goal: Plan of Care Review  Description: The Plan of Care Review/Shift note should be completed every shift.  The Outcome Evaluation is a brief statement about your assessment that the patient is improving, declining, or no change.  This information will be displayed automatically on your shift  note.  Outcome: Progressing  Flowsheets (Taken 8/12/2024 0346)  Outcome Evaluation: RA, MANUEL. Continuous fluids. Self catherization as needed.  Plan of Care Reviewed With: patient  Overall Patient Progress: no change  Goal: Absence of Hospital-Acquired Illness or Injury  Intervention: Identify and Manage Fall Risk  Recent Flowsheet Documentation  Taken 8/12/2024 0100 by Bertrand Valenzuela RN  Safety Promotion/Fall Prevention:   treat underlying cause   toileting scheduled   supervised activity   safety round/check completed   room organization consistent     Problem: Fall Injury Risk  Goal: Absence of Fall and Fall-Related Injury  Intervention: Identify and Manage Contributors  Recent Flowsheet Documentation  Taken 8/12/2024 0100 by Bertrand Valenzuela RN  Medication Review/Management: medications reviewed  Intervention: Promote Injury-Free Environment  Recent Flowsheet Documentation  Taken 8/12/2024 0100 by Bertrand Valenzuela RN  Safety Promotion/Fall Prevention:   treat underlying cause   toileting scheduled   supervised activity   safety round/check completed   room organization consistent     Problem: Fatigue  Goal: Improved Activity Tolerance  Intervention: Promote Improved Energy  Recent Flowsheet Documentation  Taken  8/12/2024 0300 by Bertrand Valenzuela, RN  Activity Management: sitting, edge of bed  Taken 8/12/2024 0124 by Bertrand Valenzuela, RN  Activity Management: sitting, edge of bed

## 2024-08-12 NOTE — PLAN OF CARE
"Goal Outcome Evaluation:      Plan of Care Reviewed With: patient    Overall Patient Progress: improvingOverall Patient Progress: improving    Outcome Evaluation: Pt A/O x4. VSS. PIV SL. Denies pain medication during shift. Assist x1 with walker and gait belt. Voiding small amounts at times. Straight cath once daily at baseline. Tolerating a regular diet well. Refusing insulin, MD aware. CMS at baseline. Plan TBD.    Bladder scan for 706cc. Pt straight cath for 600cc.       Problem: Adult Inpatient Plan of Care  Goal: Plan of Care Review  Description: The Plan of Care Review/Shift note should be completed every shift.  The Outcome Evaluation is a brief statement about your assessment that the patient is improving, declining, or no change.  This information will be displayed automatically on your shift  note.  8/12/2024 1654 by Lucy Davila RN  Outcome: Progressing  Flowsheets (Taken 8/12/2024 1654)  Outcome Evaluation: Pt A/O x4. VSS. PIV SL. Denies pain medication during shift. Assist x1 with walker and gait belt. Voiding small amounts at times. Straight cath once daily at baseline. Tolerating a regular diet well. Refusing insulin, MD aware. CMS at baseline. Plan TBD.  Plan of Care Reviewed With: patient  Overall Patient Progress: improving  8/12/2024 1357 by Lucy Davila RN  Outcome: Progressing  Goal: Patient-Specific Goal (Individualized)  Description: You can add care plan individualizations to a care plan. Examples of Individualization might be:  \"Parent requests to be called daily at 9am for status\", \"I have a hard time hearing out of my right ear\", or \"Do not touch me to wake me up as it startles  me\".  8/12/2024 1654 by Lucy Davila, RN  Outcome: Progressing  8/12/2024 1357 by Lucy Davila RN  Outcome: Progressing  Goal: Absence of Hospital-Acquired Illness or Injury  8/12/2024 1654 by Lucy Davila, RN  Outcome: Progressing  8/12/2024 1357 by Lucy Davila, RN  Outcome: " Progressing  Intervention: Identify and Manage Fall Risk  Recent Flowsheet Documentation  Taken 8/12/2024 0802 by Lucy Davila RN  Safety Promotion/Fall Prevention:   assistive device/personal items within reach   mobility aid in reach   nonskid shoes/slippers when out of bed   room door open   room near nurse's station  Intervention: Prevent Skin Injury  Recent Flowsheet Documentation  Taken 8/12/2024 0802 by Lucy Davila RN  Body Position: supine, head elevated  Skin Protection: adhesive use limited  Device Skin Pressure Protection: absorbent pad utilized/changed  Intervention: Prevent and Manage VTE (Venous Thromboembolism) Risk  Recent Flowsheet Documentation  Taken 8/12/2024 0802 by Lucy Davila RN  VTE Prevention/Management: SCDs off (sequential compression devices)  Intervention: Prevent Infection  Recent Flowsheet Documentation  Taken 8/12/2024 0802 by Lucy Davila RN  Infection Prevention: rest/sleep promoted  Goal: Optimal Comfort and Wellbeing  8/12/2024 1654 by Lucy Davila RN  Outcome: Progressing  8/12/2024 1357 by Lucy Davila RN  Outcome: Progressing  Goal: Readiness for Transition of Care  8/12/2024 1654 by Lucy Davila RN  Outcome: Progressing  8/12/2024 1357 by Lucy Davila RN  Outcome: Progressing     Problem: Fall Injury Risk  Goal: Absence of Fall and Fall-Related Injury  8/12/2024 1654 by Lucy Davila RN  Outcome: Progressing  8/12/2024 1357 by Lucy Davila RN  Outcome: Progressing  Intervention: Identify and Manage Contributors  Recent Flowsheet Documentation  Taken 8/12/2024 0802 by Lucy Davila RN  Medication Review/Management: medications reviewed  Intervention: Promote Injury-Free Environment  Recent Flowsheet Documentation  Taken 8/12/2024 0802 by Lucy Davila RN  Safety Promotion/Fall Prevention:   assistive device/personal items within reach   mobility aid in reach   nonskid shoes/slippers when out of  bed   room door open   room near nurse's station     Problem: Fatigue  Goal: Improved Activity Tolerance  8/12/2024 1654 by Lucy Davila, RN  Outcome: Progressing  8/12/2024 1357 by Lucy Davila, RN  Outcome: Progressing  Intervention: Promote Improved Energy  Recent Flowsheet Documentation  Taken 8/12/2024 1234 by Lucy Davila, RN  Activity Management:   ambulated to bathroom   up in chair

## 2024-08-13 ENCOUNTER — APPOINTMENT (OUTPATIENT)
Dept: OCCUPATIONAL THERAPY | Facility: CLINIC | Age: 84
DRG: 872 | End: 2024-08-13
Attending: INTERNAL MEDICINE
Payer: MEDICARE

## 2024-08-13 LAB
BACTERIA UR CULT: NORMAL
GLUCOSE BLDC GLUCOMTR-MCNC: 153 MG/DL (ref 70–99)
GLUCOSE BLDC GLUCOMTR-MCNC: 181 MG/DL (ref 70–99)
GLUCOSE BLDC GLUCOMTR-MCNC: 201 MG/DL (ref 70–99)
GLUCOSE BLDC GLUCOMTR-MCNC: 211 MG/DL (ref 70–99)
GLUCOSE BLDC GLUCOMTR-MCNC: 222 MG/DL (ref 70–99)

## 2024-08-13 PROCEDURE — 99232 SBSQ HOSP IP/OBS MODERATE 35: CPT | Performed by: INTERNAL MEDICINE

## 2024-08-13 PROCEDURE — 250N000013 HC RX MED GY IP 250 OP 250 PS 637: Performed by: INTERNAL MEDICINE

## 2024-08-13 PROCEDURE — 120N000001 HC R&B MED SURG/OB

## 2024-08-13 PROCEDURE — 97165 OT EVAL LOW COMPLEX 30 MIN: CPT | Mod: GO | Performed by: OCCUPATIONAL THERAPIST

## 2024-08-13 RX ADMIN — METFORMIN HYDROCHLORIDE 1000 MG: 500 TABLET ORAL at 07:42

## 2024-08-13 RX ADMIN — TAMSULOSIN HYDROCHLORIDE 0.4 MG: 0.4 CAPSULE ORAL at 20:21

## 2024-08-13 RX ADMIN — RIVAROXABAN 20 MG: 10 TABLET, FILM COATED ORAL at 16:46

## 2024-08-13 RX ADMIN — ACETAMINOPHEN 650 MG: 325 TABLET, FILM COATED ORAL at 13:14

## 2024-08-13 RX ADMIN — METOPROLOL TARTRATE 25 MG: 25 TABLET, FILM COATED ORAL at 20:21

## 2024-08-13 RX ADMIN — GABAPENTIN 100 MG: 100 CAPSULE ORAL at 20:21

## 2024-08-13 RX ADMIN — FUROSEMIDE 20 MG: 20 TABLET ORAL at 07:43

## 2024-08-13 RX ADMIN — GABAPENTIN 100 MG: 100 CAPSULE ORAL at 07:43

## 2024-08-13 RX ADMIN — LEVOTHYROXINE SODIUM 112 MCG: 0.11 TABLET ORAL at 07:43

## 2024-08-13 RX ADMIN — NITROFURANTOIN MONOHYDRATE/MACROCRYSTALS 100 MG: 75; 25 CAPSULE ORAL at 07:42

## 2024-08-13 RX ADMIN — NITROFURANTOIN MONOHYDRATE/MACROCRYSTALS 100 MG: 75; 25 CAPSULE ORAL at 20:21

## 2024-08-13 RX ADMIN — SITAGLIPTIN 100 MG: 50 TABLET, FILM COATED ORAL at 20:21

## 2024-08-13 RX ADMIN — TAMSULOSIN HYDROCHLORIDE 0.4 MG: 0.4 CAPSULE ORAL at 07:43

## 2024-08-13 RX ADMIN — LOSARTAN POTASSIUM 50 MG: 50 TABLET, FILM COATED ORAL at 20:21

## 2024-08-13 RX ADMIN — FUROSEMIDE 20 MG: 20 TABLET ORAL at 16:46

## 2024-08-13 RX ADMIN — METFORMIN HYDROCHLORIDE 1000 MG: 500 TABLET ORAL at 18:19

## 2024-08-13 RX ADMIN — METOPROLOL TARTRATE 25 MG: 25 TABLET, FILM COATED ORAL at 07:43

## 2024-08-13 RX ADMIN — GLIPIZIDE 20 MG: 10 TABLET, EXTENDED RELEASE ORAL at 07:42

## 2024-08-13 ASSESSMENT — ACTIVITIES OF DAILY LIVING (ADL)
ADLS_ACUITY_SCORE: 25
DEPENDENT_IADLS:: INDEPENDENT
ADLS_ACUITY_SCORE: 32
ADLS_ACUITY_SCORE: 25
ADLS_ACUITY_SCORE: 26
ADLS_ACUITY_SCORE: 32
ADLS_ACUITY_SCORE: 26
ADLS_ACUITY_SCORE: 25
ADLS_ACUITY_SCORE: 25
ADLS_ACUITY_SCORE: 26
ADLS_ACUITY_SCORE: 25
ADLS_ACUITY_SCORE: 25
ADLS_ACUITY_SCORE: 26
ADLS_ACUITY_SCORE: 25
ADLS_ACUITY_SCORE: 26
ADLS_ACUITY_SCORE: 25
ADLS_ACUITY_SCORE: 25
ADLS_ACUITY_SCORE: 26
ADLS_ACUITY_SCORE: 25
ADLS_ACUITY_SCORE: 32
ADLS_ACUITY_SCORE: 26
ADLS_ACUITY_SCORE: 25
ADLS_ACUITY_SCORE: 25
ADLS_ACUITY_SCORE: 27

## 2024-08-13 NOTE — CONSULTS
Care Management Initial Consult    General Information  Assessment completed with: Patient,    Type of CM/SW Visit: Initial Assessment    Primary Care Provider verified and updated as needed: Yes   Readmission within the last 30 days: no previous admission in last 30 days      Reason for Consult: discharge planning    Communication Assessment  Patient's communication style: spoken language (English or Bilingual)    Hearing Difficulty or Deaf: no   Wear Glasses or Blind: no    Cognitive  Cognitive/Neuro/Behavioral: WDL                      Living Environment:   People in home: spouse     Current living Arrangements: house      Able to return to prior arrangements: other (see comments)  Living Arrangement Comments: TBD    Family/Social Support:  Care provided by: self  Provides care for: spouse  Marital Status:   Wife, Children          Description of Support System: Supportive       Current Resources:   Patient receiving home care services: No     Community Resources: None  Equipment currently used at home: walker, rolling, cane, straight  Supplies currently used at home: None    Employment/Financial:  Employment Status: retired        Financial Concerns: none      Does the patient's insurance plan have a 3 day qualifying hospital stay waiver?  No    Lifestyle & Psychosocial Needs:  Social Determinants of Health     Food Insecurity: No Food Insecurity (8/8/2024)    [facility]    Food Insecurity     Worried About Running Out of Food in the Last Year: 1   Depression: Not at risk (7/9/2024)    Received from [facility]    PHQ-2     PHQ-2 TOTAL SCORE: 0   Housing Stability: Low Risk  (8/8/2024)    Received from [facility]    Housing Stability     Unable to Pay for Housing in the Last Year: 1   Tobacco Use: Low Risk  (8/8/2024)    Received from [facility]    Patient  History     Smoking Tobacco Use: Never     Smokeless Tobacco Use: Never     Passive Exposure: Not on file   Financial Resource Strain: Low Risk  (8/8/2024)    Received from [facility]    Financial Resource Strain     Difficulty of Paying Living Expenses: 3     Difficulty of Paying Living Expenses: Not on file   Alcohol Use: Not on file   Transportation Needs: No Transportation Needs (8/8/2024)    Received from [facility]    Transportation Needs     Lack of Transportation (Medical): 1   Physical Activity: Not on file   Interpersonal Safety: Not on file   Stress: Not on file   Social Connections: Socially Integrated (8/8/2024)    Received from [facility]    Social Connections     Frequency of Communication with Friends and Family: 0   Health Literacy: Not on file       Functional Status:  Prior to admission patient needed assistance:   Dependent ADLs:: Independent  Dependent IADLs:: Independent  Assesssment of Functional Status: Not at baseline with mobility    Mental Health Status:  Mental Health Status: No Current Concerns       Values/Beliefs:  Spiritual, Cultural Beliefs, Yarsanism Practices, Values that affect care: other (see comments)             Additional Information:  Care management consult for discharge planning/disposition. Patient admitted for sepsis, weakness, frequent falls, chronic urine retention, UTI. PT and OT evaluated patient and current recommendation is TCU pending his progress with IP PT.     Met with patient at bedside. He states he lives with his wife whom he is primary caregiver for and is concerned about her managing at home. He states she is doing ok so far and their daughter is checking in on her when able. Discussed PT and OT recommendation of TCU placement and what that entails. Patient does not want to be away from his wife more days then necessary. Discussed option of home care if he  progresses enough in hospital to be safe to go home. Patient agrees to this. Instructed that we would see how patient progresses and re-visit TCU if needed.     Transportation to be determined.     Care management to follow for discharge.     Elizabeth Romero RN  Care Coordinator  [facility]

## 2024-08-13 NOTE — PLAN OF CARE
"Goal Outcome Evaluation:      Plan of Care Reviewed With: patient    Overall Patient Progress: improvingOverall Patient Progress: improving    Outcome Evaluation: Pt A/O x4. VSS. PIV SL. PRN tylenol given for pain. Assist x1 with walker and gait belt. Voiding small amounts at times. Straight cath once daily at baseline. Tolerating a regular diet well. Refusing insulin, MD aware. CMS at baseline. Plan TBD. PT recommending TCU at discharge.      Problem: Adult Inpatient Plan of Care  Goal: Plan of Care Review  Description: The Plan of Care Review/Shift note should be completed every shift.  The Outcome Evaluation is a brief statement about your assessment that the patient is improving, declining, or no change.  This information will be displayed automatically on your shift  note.  8/13/2024 1519 by Lucy Davila RN  Outcome: Progressing  Flowsheets (Taken 8/13/2024 1519)  Outcome Evaluation: Pt A/O x4. VSS. PIV SL. PRN tylenol given for pain. Assist x1 with walker and gait belt. Voiding small amounts at times. Straight cath once daily at baseline. Tolerating a regular diet well. Refusing insulin, MD aware. CMS at baseline. Plan TBD. PT recommending TCU at discharge.  Plan of Care Reviewed With: patient  Overall Patient Progress: improving  8/13/2024 1217 by Lucy Davila RN  Outcome: Progressing  Goal: Patient-Specific Goal (Individualized)  Description: You can add care plan individualizations to a care plan. Examples of Individualization might be:  \"Parent requests to be called daily at 9am for status\", \"I have a hard time hearing out of my right ear\", or \"Do not touch me to wake me up as it startles  me\".  8/13/2024 1519 by Lucy Davila RN  Outcome: Progressing  8/13/2024 1217 by Lucy Davila RN  Outcome: Progressing  Goal: Absence of Hospital-Acquired Illness or Injury  8/13/2024 1519 by Lucy Davila, RN  Outcome: Progressing  8/13/2024 1217 by Lucy Davila, RN  Outcome: " Progressing  Intervention: Identify and Manage Fall Risk  Recent Flowsheet Documentation  Taken 8/13/2024 0848 by Lucy Davila RN  Safety Promotion/Fall Prevention:   activity supervised   clutter free environment maintained   lighting adjusted   mobility aid in reach   nonskid shoes/slippers when out of bed   safety round/check completed  Intervention: Prevent Skin Injury  Recent Flowsheet Documentation  Taken 8/13/2024 0848 by Lucy Davila RN  Skin Protection: adhesive use limited  Device Skin Pressure Protection: absorbent pad utilized/changed  Intervention: Prevent and Manage VTE (Venous Thromboembolism) Risk  Recent Flowsheet Documentation  Taken 8/13/2024 0848 by Lucy Davila RN  VTE Prevention/Management:   patient refused intervention   SCDs off (sequential compression devices)  Intervention: Prevent Infection  Recent Flowsheet Documentation  Taken 8/13/2024 0848 by Lucy Davila RN  Infection Prevention: rest/sleep promoted  Goal: Optimal Comfort and Wellbeing  8/13/2024 1519 by Lucy Davila RN  Outcome: Progressing  8/13/2024 1217 by Lucy Davila RN  Outcome: Progressing  Intervention: Monitor Pain and Promote Comfort  Recent Flowsheet Documentation  Taken 8/13/2024 1314 by Lucy Davila RN  Pain Management Interventions: medication (see MAR)  Goal: Readiness for Transition of Care  8/13/2024 1519 by Lucy Davila RN  Outcome: Progressing  8/13/2024 1217 by Lucy Davila RN  Outcome: Progressing     Problem: Fall Injury Risk  Goal: Absence of Fall and Fall-Related Injury  8/13/2024 1519 by Lucy Davila RN  Outcome: Progressing  8/13/2024 1217 by Lucy Davila RN  Outcome: Progressing  Intervention: Identify and Manage Contributors  Recent Flowsheet Documentation  Taken 8/13/2024 0848 by Lucy Davila RN  Medication Review/Management: medications reviewed  Intervention: Promote Injury-Free Environment  Recent Flowsheet  Documentation  Taken 8/13/2024 0848 by Lucy Davila, RN  Safety Promotion/Fall Prevention:   activity supervised   clutter free environment maintained   lighting adjusted   mobility aid in reach   nonskid shoes/slippers when out of bed   safety round/check completed     Problem: Fatigue  Goal: Improved Activity Tolerance  8/13/2024 1519 by Lucy Davila, RN  Outcome: Progressing  8/13/2024 1217 by Lucy Davila, RN  Outcome: Progressing  Intervention: Promote Improved Energy  Recent Flowsheet Documentation  Taken 8/13/2024 1100 by Lucy Davila, RN  Activity Management:   ambulated to bathroom   back to bed

## 2024-08-13 NOTE — TELEPHONE ENCOUNTER
[facility] Call Center    Phone Message    May a detailed message be left on voicemail: no     Reason for Call: Other: pt calling and is in the hospital and needs his appt rescheduled, please call pt     Action Taken: Other: urology    Travel Screening: Not Applicable     Date of Service:

## 2024-08-13 NOTE — PLAN OF CARE
"Goal Outcome Evaluation:           Overall Patient Progress: no changeOverall Patient Progress: no change    Outcome Evaluation: Discharge pending- may need TCU    A&O x 4. Regular diet. BG monitoring- 254 & 153. Refusing sliding scale insulin correction. VSS on RA. Intermittent self caths. Up with 1A, GB, FWW. PRN Tylenol for generalized discomfort. PO ABX's.       Problem: Adult Inpatient Plan of Care  Goal: Plan of Care Review  Description: The Plan of Care Review/Shift note should be completed every shift.  The Outcome Evaluation is a brief statement about your assessment that the patient is improving, declining, or no change.  This information will be displayed automatically on your shift  note.  Outcome: Progressing  Flowsheets (Taken 8/13/2024 0413)  Outcome Evaluation: Discharge pending- may need TCU  Overall Patient Progress: no change  Goal: Patient-Specific Goal (Individualized)  Description: You can add care plan individualizations to a care plan. Examples of Individualization might be:  \"Parent requests to be called daily at 9am for status\", \"I have a hard time hearing out of my right ear\", or \"Do not touch me to wake me up as it startles  me\".  Outcome: Progressing  Goal: Absence of Hospital-Acquired Illness or Injury  Outcome: Progressing  Intervention: Identify and Manage Fall Risk  Recent Flowsheet Documentation  Taken 8/12/2024 2000 by Maryam Andujar RN  Safety Promotion/Fall Prevention:   activity supervised   assistive device/personal items within reach   clutter free environment maintained   nonskid shoes/slippers when out of bed   safety round/check completed  Intervention: Prevent Skin Injury  Recent Flowsheet Documentation  Taken 8/12/2024 2000 by Maryam Andujar RN  Skin Protection: adhesive use limited  Device Skin Pressure Protection: absorbent pad utilized/changed  Intervention: Prevent Infection  Recent Flowsheet Documentation  Taken 8/12/2024 2000 by Con" Maryam, RN  Infection Prevention:   single patient room provided   rest/sleep promoted  Goal: Optimal Comfort and Wellbeing  Outcome: Progressing  Intervention: Monitor Pain and Promote Comfort  Recent Flowsheet Documentation  Taken 8/12/2024 1952 by Maryam Andujar, RN  Pain Management Interventions: medication (see MAR)  Goal: Readiness for Transition of Care  Outcome: Progressing     Problem: Fall Injury Risk  Goal: Absence of Fall and Fall-Related Injury  Outcome: Progressing  Intervention: Identify and Manage Contributors  Recent Flowsheet Documentation  Taken 8/12/2024 2000 by Maryam Andujar, RN  Medication Review/Management:   medications reviewed   high-risk medications identified  Intervention: Promote Injury-Free Environment  Recent Flowsheet Documentation  Taken 8/12/2024 2000 by Maryam Andujar, RN  Safety Promotion/Fall Prevention:   activity supervised   assistive device/personal items within reach   clutter free environment maintained   nonskid shoes/slippers when out of bed   safety round/check completed     Problem: Fatigue  Goal: Improved Activity Tolerance  Outcome: Progressing

## 2024-08-13 NOTE — PROGRESS NOTES
[facility]    Medicine Progress Note - Hospitalist Service    Date of Admission:  8/11/2024    Primary Care Physician   Abe Eng  CONSULTANTS:  physical therapy and occupational therapy     Assessment & Plan   83 year old gentleman with atrial fib, hypertension, type 2 diabetes mellitus with peripheral neuropathy, hypercholesteremia, hypothyroidism, ABDIEL, BPH, OA, benign cranial nerve tumor, multiple falls, bladder tumor s/p transurethral bladder tumor resection 1/3/2024 who came to [facility] ER on 8/11/24 for concerns about generalized weakness and had a fall when he got up from a bench and the concrete was sloped down.      Patient was recently seen in the outpatient setting on 8/8/2024 for similar concerns that he presented with today.  He was also complaining of abdominal pain at that visit.  CT scan of the abdomen pelvis was performed which showed no acute findings, though there was some subtle asymmetric wall thickening involving the right posterior lateral urinary bladder wall.  Patient does have known history of bladder cancer and underwent tumor resection in 1/24.  He also has urine retention requiring intermittent straight catheterization.  The patient was prescribed Duricef for presumed UTI/pyelonephritis.  He is taken 3 doses of the medication with no significant improvement of his symptoms .     Presentation in the ER, vital signs notable for systolic blood pressure 130, heart rate near 65, no fever, and no hypoxia.  Pertinent labs in the ER today included abnormal urinalysis with pyuria and hematuria, with large leukocyte esterase present.  Urine culture obtained and pending.  Peripheral white blood cell count near 17.  PCR testing for COVID influenza and RSV negative.  Complete metabolic panel rather unremarkable..  Troponin within normal limits.  CK normal.  Lactic acid elevated at 3.6.  This has not yet been rechecked.  Imaging today included a chest  x-ray showing no acute findings.  Patient was placed on rocephin empirically and admitted to the hospital. He has a chronic agarwal in place.           1.  Sepsis (leukocytosis, lactic acidosis) due to complicated UTI due to straight caths and uncontrolled diabetes   Patient presented with a fall and weakness. He was  non-toxic appearing.  Treat with Unasyn (history of Enterococcus in previous urine culture) after getting a dose of rocephin in the emergency room.  Both blood and urine cultures are pending and negative to date.  Patient was given IV fluids.  His lactic acid went from 3.6 down to 2.3 and does not appear all that ill.  Previous urine culture 2/24 grew Enterococcus and Aerococcus; Enterococcus was pansensitive to all antibiotics tested (including penicillin).  Will switch the patient to oral Macrobid while awaiting culture results.  Blood culture pending.  Urine culture showing vick.  Patient is doing better, will finish his course of antibiotics.  Sepsis was suspected on admission and has improved.      2.  Chronic urine retention requiring intermittent straight catheterization   Per report straight caths.  Needs to do this more often to prevent urinary retention.          3.  History of bladder cancer, s/p resection 1/24  Follows with Dr. Daniels of urology.  Received BCG treatments. Reports he is cancer free and has frequent monitoring cystoscopy, next scheduled on 8/14/2024.     4.  Diabetes mellitus type II with peripheral neuropathy of lower extremities  Insulin sliding scale as needed which he is refusing.  Patient will continue on his home Glucotrol, Januvia, and will resume his metformin.  His blood sugars are uncontrolled and this is likely contributing factor to his UTI. Hemoglobin A1c 7.6 as of 8/11/24.  Patient's blood sugars are improving down to 222.  Patient has been refusing insulin.     5.  Atrial fibrillation, sinus bradycardia, sinus pause 2 seconds  Chronically treated with Xarelto  for anticoagulation, is on a beta-blocker for rate control patient was followed on telemetry and will be decreasing his beta-blocker dose due to bradycardia down to 42 along with a sinus pause.  This could be contributing factor to his weakness and falls.  Pulse while awake is 54-60s with his decreased beta blocker dose.      6.  Acute on chronic generalized weakness/deconditioning resulting in frequent falls, polypharmacy  Patient with a non-focal neuro exam but found to likely have a UTI also polypharmacy could be playing a role.  PT and OT have been consulted.  Patient may require TCU placement as he resides in assisted living.  He does use a 4 wheeled walker.  Could consider an MRI of the lumbar spine if patient fails to improve with treatment of UTI or demonstrates focal LE weakness on exam given his history of his bladder ca.   Therapy recommending tcu at discharge.      7.  Hypothyroidism   TSH was normal at 2.67    8.  History of benign brain tumor  Followed at outside institution; scant details on this currently. Has had follow-up brain imaging showing no growth. Untreated         4Ms:  Polypharmacy: Medications reviewed.  At this point patient is on a statin which I would recommend getting rid of as the risk outweighs the benefits especially in the setting of weakness and falls.  His beta-blocker dose will be cut in dose as well as his bradycardia/sinus pauses could be playing a role  Mentation:  Capacity seems intact make his own medical decision  Social support: Per report lives in assisted living with his wife  Mobility: Uses a 4 wheeled walker  What is importmant: N/A      Discussed plan of care with nursing, social work/case management      Diet: Combination Diet Regular Diet Adult    DVT Prophylaxis: DOAC  [place] Catheter: Not present  Lines: None     Cardiac Monitoring: None    RESTRAINTS: not indicated   Code Status: Full Code       This document was created using voice recognition technology.   "Please excuse any typographical errors that may have occurred.  Please call with any questions.         # Hypocalcemia: Lowest Ca = 8.5 mg/dL in last 2 days, will monitor and replace as appropriate           # Hypertension: Noted on problem list          # DMII: A1C = 7.6 % (Ref range: <5.7 %) within past 6 months, PRESENT ON ADMISSION  # Obesity: Estimated body mass index is 32.01 kg/m  as calculated from the following:    Height as of this encounter: 1.727 m (5' 8\").    Weight as of this encounter: 95.5 kg (210 lb 8.6 oz)., PRESENT ON ADMISSION                  Disposition Plan      Expected Discharge Date: 08/14/2024        Discharge Comments: From Assisted Living - will need TCU          Barrier to discharge: Improved infection, improved weakness,  needs bed at TCu    Medically Ready for Discharge: Anticipated Tomorrow,           Edwar Davila MD  Hospitalist Service  [facility]  Securely message with [employer] (more info)  Text page via [employer] Paging/Directory   ______________________________________________________________________    Interval History   Patient overall is feeling better.  Continues to be weak and therapy saw the patient recommending TCU.  Patient's culture in his urine is actually showing vick but the patient has shown improvement in his symptoms.  Plan is for TCU as early as tomorrow.      ROS: A comprehensive review of systems was negative except for items noted in the HPI.  Patient currently denies any fever, chills, sweats, nausea, vomiting, diarrhea, shortness of breath, or chest pain.      Physical Exam   Vital Signs: Temp: 96.9  F (36.1  C) Temp src: Temporal BP: (!) 146/70 Pulse: 65   Resp: 16 SpO2: 94 % O2 Device: None (Room air)    Weight: 210 lbs 8.63 oz    Exam is stable from yesterday  General appearance: Patient is alert and oriented x3, no apparent distress, pleasant and conversing normally, speaking in full sentences, appears but is elderly and frail " stated age, lying flat in bed  HEENT:    Mucous membranes are moist  RESPIRATORY: Clear to auscultation bilateral, good air movement  CARDIOVASCULAR: Currently sounds regular rate and rhythm,  no murmurs    GASTROINTESTINAL: Non-distended, non-tender, soft, bowel sounds present throughout,   NEUROLOGIC:  Cranial nerves II-XII intact, without any focal deficits, strength 5/5 throughout  EXTREMITIES:  Moves all extremities, no clubbing, cyanosis, nor edema  : Patient self caths.            Imaging:   Results for orders placed or performed during the hospital encounter of 08/11/24   XR Chest 2 Views    Narrative    EXAM: XR CHEST 2 VIEWS  LOCATION: [facility]  DATE: 8/11/2024    INDICATION: Cough, weakness.  COMPARISON: 8/8/2024      Impression    IMPRESSION: Heart size and pulmonary vascularity within normal limits. Hyperinflation of the lungs. Minimal atelectasis left lung base. Aortic calcification. Hypertrophic changes thoracic spine. Metallic density left anterior chest wall stable.     Procedures: none   I have personally have reviewed the patient's most up to date labs, orders, and medications myself

## 2024-08-14 ENCOUNTER — APPOINTMENT (OUTPATIENT)
Dept: PHYSICAL THERAPY | Facility: CLINIC | Age: 84
DRG: 872 | End: 2024-08-14
Payer: MEDICARE

## 2024-08-14 ENCOUNTER — APPOINTMENT (OUTPATIENT)
Dept: OCCUPATIONAL THERAPY | Facility: CLINIC | Age: 84
DRG: 872 | End: 2024-08-14
Payer: MEDICARE

## 2024-08-14 LAB
GLUCOSE BLDC GLUCOMTR-MCNC: 207 MG/DL (ref 70–99)
GLUCOSE BLDC GLUCOMTR-MCNC: 214 MG/DL (ref 70–99)
GLUCOSE BLDC GLUCOMTR-MCNC: 240 MG/DL (ref 70–99)
GLUCOSE BLDC GLUCOMTR-MCNC: 286 MG/DL (ref 70–99)
GLUCOSE BLDC GLUCOMTR-MCNC: 312 MG/DL (ref 70–99)

## 2024-08-14 PROCEDURE — 97116 GAIT TRAINING THERAPY: CPT | Mod: GP

## 2024-08-14 PROCEDURE — 250N000013 HC RX MED GY IP 250 OP 250 PS 637: Performed by: INTERNAL MEDICINE

## 2024-08-14 PROCEDURE — 120N000001 HC R&B MED SURG/OB

## 2024-08-14 PROCEDURE — 97530 THERAPEUTIC ACTIVITIES: CPT | Mod: GP

## 2024-08-14 PROCEDURE — 97535 SELF CARE MNGMENT TRAINING: CPT | Mod: GO | Performed by: REHABILITATION PRACTITIONER

## 2024-08-14 PROCEDURE — 99238 HOSP IP/OBS DSCHRG MGMT 30/<: CPT | Performed by: INTERNAL MEDICINE

## 2024-08-14 RX ORDER — NITROFURANTOIN 25; 75 MG/1; MG/1
100 CAPSULE ORAL EVERY 12 HOURS
Qty: 16 CAPSULE | Refills: 0 | Status: SHIPPED | OUTPATIENT
Start: 2024-08-14 | End: 2024-08-22

## 2024-08-14 RX ORDER — METOPROLOL TARTRATE 25 MG/1
25 TABLET, FILM COATED ORAL 2 TIMES DAILY
Qty: 60 TABLET | Refills: 0 | Status: SHIPPED | OUTPATIENT
Start: 2024-08-14

## 2024-08-14 RX ADMIN — METOPROLOL TARTRATE 25 MG: 25 TABLET, FILM COATED ORAL at 20:19

## 2024-08-14 RX ADMIN — TAMSULOSIN HYDROCHLORIDE 0.4 MG: 0.4 CAPSULE ORAL at 20:19

## 2024-08-14 RX ADMIN — LEVOTHYROXINE SODIUM 112 MCG: 0.11 TABLET ORAL at 08:31

## 2024-08-14 RX ADMIN — GLIPIZIDE 20 MG: 10 TABLET, EXTENDED RELEASE ORAL at 08:31

## 2024-08-14 RX ADMIN — NITROFURANTOIN MONOHYDRATE/MACROCRYSTALS 100 MG: 75; 25 CAPSULE ORAL at 20:19

## 2024-08-14 RX ADMIN — LOSARTAN POTASSIUM 50 MG: 50 TABLET, FILM COATED ORAL at 20:19

## 2024-08-14 RX ADMIN — METFORMIN HYDROCHLORIDE 1000 MG: 500 TABLET ORAL at 08:31

## 2024-08-14 RX ADMIN — GABAPENTIN 100 MG: 100 CAPSULE ORAL at 20:19

## 2024-08-14 RX ADMIN — NITROFURANTOIN MONOHYDRATE/MACROCRYSTALS 100 MG: 75; 25 CAPSULE ORAL at 08:31

## 2024-08-14 RX ADMIN — METFORMIN HYDROCHLORIDE 1000 MG: 500 TABLET ORAL at 18:08

## 2024-08-14 RX ADMIN — FUROSEMIDE 20 MG: 20 TABLET ORAL at 16:34

## 2024-08-14 RX ADMIN — GABAPENTIN 100 MG: 100 CAPSULE ORAL at 08:31

## 2024-08-14 RX ADMIN — FUROSEMIDE 20 MG: 20 TABLET ORAL at 08:31

## 2024-08-14 RX ADMIN — RIVAROXABAN 20 MG: 10 TABLET, FILM COATED ORAL at 16:34

## 2024-08-14 RX ADMIN — TAMSULOSIN HYDROCHLORIDE 0.4 MG: 0.4 CAPSULE ORAL at 08:31

## 2024-08-14 RX ADMIN — SITAGLIPTIN 100 MG: 50 TABLET, FILM COATED ORAL at 20:19

## 2024-08-14 RX ADMIN — METOPROLOL TARTRATE 25 MG: 25 TABLET, FILM COATED ORAL at 08:31

## 2024-08-14 ASSESSMENT — ACTIVITIES OF DAILY LIVING (ADL)
ADLS_ACUITY_SCORE: 30
ADLS_ACUITY_SCORE: 32
ADLS_ACUITY_SCORE: 30
ADLS_ACUITY_SCORE: 30
ADLS_ACUITY_SCORE: 32
ADLS_ACUITY_SCORE: 30
ADLS_ACUITY_SCORE: 32
ADLS_ACUITY_SCORE: 30
ADLS_ACUITY_SCORE: 32
ADLS_ACUITY_SCORE: 30
ADLS_ACUITY_SCORE: 32
ADLS_ACUITY_SCORE: 30
ADLS_ACUITY_SCORE: 30
ADLS_ACUITY_SCORE: 32
ADLS_ACUITY_SCORE: 32
ADLS_ACUITY_SCORE: 30

## 2024-08-14 NOTE — PLAN OF CARE
"Goal Outcome Evaluation:      Plan of Care Reviewed With: patient    Overall Patient Progress: improvingOverall Patient Progress: improving    Outcome Evaluation: Pt A/O x4. VSS. PIV SL. PRN tylenol given for pain. Assist x1 with walker and gait belt. Voiding small amounts at times. Straight cath at baseline. Tolerating a regular diet well. Refusing insulin, MD aware. CMS at baseline. Plan TBD. PT recommending TCU at discharge.    Pt willing to look at TCU options now and would like to talk with SW,       Problem: Adult Inpatient Plan of Care  Goal: Plan of Care Review  Description: The Plan of Care Review/Shift note should be completed every shift.  The Outcome Evaluation is a brief statement about your assessment that the patient is improving, declining, or no change.  This information will be displayed automatically on your shift  note.  8/14/2024 1407 by Lucy Davila RN  Outcome: Progressing  Flowsheets (Taken 8/14/2024 1407)  Outcome Evaluation: Pt A/O x4. VSS. PIV SL. PRN tylenol given for pain. Assist x1 with walker and gait belt. Voiding small amounts at times. Straight cath at baseline. Tolerating a regular diet well. Refusing insulin, MD aware. CMS at baseline. Plan TBD. PT recommending TCU at discharge.  Plan of Care Reviewed With: patient  Overall Patient Progress: improving  8/14/2024 1144 by Lucy Davila, RN  Outcome: Progressing  Goal: Patient-Specific Goal (Individualized)  Description: You can add care plan individualizations to a care plan. Examples of Individualization might be:  \"Parent requests to be called daily at 9am for status\", \"I have a hard time hearing out of my right ear\", or \"Do not touch me to wake me up as it startles  me\".  8/14/2024 1407 by Lucy Davila, RN  Outcome: Progressing  8/14/2024 1144 by Lucy Davila, RN  Outcome: Progressing  Goal: Absence of Hospital-Acquired Illness or Injury  8/14/2024 1407 by Lucy Davila, RN  Outcome: " Progressing  8/14/2024 1144 by Lucy Davila RN  Outcome: Progressing  Intervention: Prevent Skin Injury  Recent Flowsheet Documentation  Taken 8/14/2024 0831 by Lucy Davila RN  Body Position: supine, head elevated  Goal: Optimal Comfort and Wellbeing  8/14/2024 1407 by Lucy Davila RN  Outcome: Progressing  8/14/2024 1144 by Lucy Davila RN  Outcome: Progressing  Intervention: Monitor Pain and Promote Comfort  Recent Flowsheet Documentation  Taken 8/14/2024 0831 by Lucy Davila RN  Pain Management Interventions: medication (see MAR)  Goal: Readiness for Transition of Care  8/14/2024 1407 by Lucy Davila RN  Outcome: Progressing  8/14/2024 1144 by Lucy Davila RN  Outcome: Progressing     Problem: Fall Injury Risk  Goal: Absence of Fall and Fall-Related Injury  8/14/2024 1407 by Lucy Davila RN  Outcome: Progressing  8/14/2024 1144 by Lucy Davila RN  Outcome: Progressing     Problem: Fatigue  Goal: Improved Activity Tolerance  8/14/2024 1407 by Lucy Davila RN  Outcome: Progressing  8/14/2024 1144 by Lucy Davila RN  Outcome: Progressing  Intervention: Promote Improved Energy  Recent Flowsheet Documentation  Taken 8/14/2024 0831 by Lucy Davila, RN  Activity Management: activity adjusted per tolerance

## 2024-08-14 NOTE — DISCHARGE SUMMARY
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  History of atrial fibrillation  Sinus bradycardia  Sinus pauses  Acute generalized weakness  Deconditioning  History of falls  Polypharmacy  Hypothyroidism    Hospital Course     83 year old gentleman with atrial fib, hypertension, type 2 diabetes mellitus with peripheral neuropathy, hypercholesteremia, hypothyroidism, ABDIEL, BPH, OA, benign cranial nerve tumor, multiple falls, bladder tumor s/p transurethral bladder tumor resection 1/3/2024 who came to [facility] ER on 8/11/24 for concerns about generalized weakness and had a fall when he got up from a bench and the concrete was sloped down and found to have lactic acidosis and likely sepsis.      Patient was recently seen in the outpatient setting on 8/8/2024 for similar concerns that he presented with today.  He was also complaining of abdominal pain at that visit.  CT scan of the abdomen pelvis was performed which showed no acute findings, though there was some subtle asymmetric wall thickening involving the right posterior lateral urinary bladder wall.  Patient does have known history of bladder cancer and underwent tumor resection in 1/24.  He also has urine retention requiring intermittent straight catheterization.  The patient was prescribed Duricef for presumed UTI/pyelonephritis.  He is taken 3 doses of the medication with no significant improvement of his symptoms .      Presentation in the ER, vital signs notable for systolic blood pressure 130, heart rate near 65, no  fever, and no hypoxia.  Pertinent labs in the ER today included abnormal urinalysis with pyuria and hematuria, with large leukocyte esterase present.  Urine culture obtained and pending.  Peripheral white blood cell count near 17.  PCR testing for COVID influenza and RSV negative.  Complete metabolic panel rather unremarkable..  Troponin within normal limits.  CK normal.  Lactic acid elevated at 3.6.   Imaging in the emergency room included a chest x-ray showing no acute findings.  Patient was placed on rocephin empirically and admitted to the hospital.       Patient was kept extra night because the patient could not decide if he was going home versus going to a TCU.  He ended up agreeing to a TCU.  No changes in his plan as below.  Orders reviewed and updated.       1.  Early Sepsis (leukocytosis, lactic acidosis) due to complicated UTI due to straight caths and uncontrolled diabetes  Patient presented with a fall and weakness. He was  non-toxic appearing.  Treat with Unasyn (history of Enterococcus in previous urine culture) after getting a dose of rocephin in the emergency room.  Both blood and urine cultures are negative to date.  Patient was given IV fluids.  His lactic acid went from 3.6 down to 2.3 and did not appear all that ill.  Previous urine culture 2/24 grew Enterococcus and Aerococcus; Enterococcus was pansensitive to all antibiotics tested (including penicillin).  He was switched to a course of oral Macrobid.  Urine culture showing vick.  Patient is doing better, will finish his course of antibiotics.  Sepsis was suspected on admission and has improved.  Patient has only been self cathing only 1-2 times a day which leads to urinary retention increased risk of infection.  He has been instructed to cath himself at least 4 times a day.     2.  Chronic urine retention requiring intermittent straight catheterization  Per report straight caths.  Needs to do this more often to prevent urinary retention.   This puts the patient at high risk for infections if he does not cath regularly.  Patient was instructed to catheterize least 4 times a day.    Patient to be going home with home care including nursing to educate him on this fact to make sure he is compliant.     3.  History of bladder cancer, s/p resection 1/24  Follows with Dr. Daniels of urology.  Received BCG treatments. Reports he is cancer free and has frequent monitoring cystoscopy, next scheduled on 8/14/2024.  Patient did have subtle thickening of the bladder wall on imaging in the emergency room.     4.  Uncontrolled diabetes mellitus type II with peripheral neuropathy of lower extremities  Insulin sliding scale as needed which he is refusing.  Patient will continue on his home Glucotrol, Januvia, and will resume his metformin.  His elevated blood sugars prevent high risk for infections.  His blood sugars are uncontrolled and this is likely contributing factor to his UTI. Hemoglobin A1c 7.6 as of 8/11/24.  Patient's blood sugars are improving down to 222.        5.  Atrial fibrillation, sinus bradycardia, sinus pause 2 seconds  Chronically treated with Xarelto for anticoagulation, is on a beta-blocker for rate control patient was followed on telemetry and will be decreasing his beta-blocker dose due to bradycardia down to 42 along with a sinus pause.  This could be contributing factor to his weakness and falls.  Pulse while awake is 54-60s with his decreased beta blocker dose.      6.  Acute on chronic generalized weakness/deconditioning resulting in frequent falls, polypharmacy  Patient with a non-focal neuro exam but found to likely have a UTI also polypharmacy could be playing a role.  PT and OT have been consulted.  Patient may require TCU placement as he resides in assisted living.  He does use a 4 wheeled walker.  Could consider an MRI of the lumbar spine if patient fails to improve with treatment of UTI or demonstrates focal LE weakness on exam  "given his history of his bladder ca.   Therapy recommending tcu at discharge though the patient is refusing this as he has a primary care provider for his wife.  Patient will be going home with home care instead at his request.      7.  Hypothyroidism              TSH was normal at 2.67     8.  History of benign brain tumor  Followed at outside institution; scant details on this currently. Has had follow-up brain imaging showing no growth. Untreated     9.  Deconditioning   Going to a Tcu for therapy           4Ms:  Polypharmacy: Medications reviewed.  At this point patient is on a statin which I would recommend getting rid of as the risk outweighs the benefits especially in the setting of weakness and falls.  His beta-blocker dose will be cut in dose as well as his bradycardia/sinus pauses could be playing a role  Mentation:  Capacity seems intact make his own medical decision  Social support: Per report lives in assisted living with his wife  Mobility: Uses a 4 wheeled walker  What is importmant: N/A    Clinically Significant Risk Factors     # DMII: A1C = 7.6 % (Ref range: <5.7 %) within past 6 months  # Obesity: Estimated body mass index is 32.01 kg/m  as calculated from the following:    Height as of this encounter: 1.727 m (5' 8\").    Weight as of this encounter: 95.5 kg (210 lb 8.6 oz).       Significant Results and Procedures   Most Recent 3 CBC's:  Recent Labs   Lab Test 08/12/24  0742 08/11/24  1310 01/08/24  1515 01/08/24  0854   WBC 13.6* 16.8*  --  15.7*   HGB 12.0* 12.5* 11.9* 11.5*   MCV 99 99  --  99    191  --  170     Most Recent 3 BMP's:  Recent Labs   Lab Test 08/14/24  0210 08/13/24  2203 08/13/24  1728 08/12/24  0800 08/12/24  0742 08/11/24  1719 08/11/24  1310 01/08/24  1305 01/08/24  0854   NA  --   --   --   --  136  --  132*  --  139   POTASSIUM  --   --   --   --  3.8  --  4.1  --  4.0   CHLORIDE  --   --   --   --  101  --  94*  --  104   CO2  --   --   --   --  22  --  23  --  26 "   BUN  --   --   --   --  17.3  --  22.3  --  16.8   CR  --   --   --   --  0.94  --  1.10  --  1.01   ANIONGAP  --   --   --   --  13  --  15  --  9   BREANNE  --   --   --   --  8.5*  --  9.0  --  8.5*   * 211* 181*   < > 237*   < > 258*   < > 259*    < > = values in this interval not displayed.   ,   Results for orders placed or performed during the hospital encounter of 08/11/24   XR Chest 2 Views    Narrative    EXAM: XR CHEST 2 VIEWS  LOCATION: [facility]  DATE: 8/11/2024    INDICATION: Cough, weakness.  COMPARISON: 8/8/2024      Impression    IMPRESSION: Heart size and pulmonary vascularity within normal limits. Hyperinflation of the lungs. Minimal atelectasis left lung base. Aortic calcification. Hypertrophic changes thoracic spine. Metallic density left anterior chest wall stable.             Follow up/instructions: Patient needs a follow-up with his primary care provider in a week making sure that his weakness has improved, blood sugars are better controlled, and is not having ongoing falls.  Patient refused to go to a TCU and will only be getting home care at this point.  He also has urology follow-up in the near future with outpatient cystoscopy to follow-up his bladder cancer    Pending test results at discharge:     Unresulted Labs Ordered in the Past 30 Days of this Admission       Date and Time Order Name Status Description    8/11/2024  1:45 PM Blood Culture Arm, Right Preliminary             Discharge Orders      Home Care Referral      Reason for your hospital stay    UTI, weakness     Follow-up and recommended labs and tests     Follow up with primary care provider, Abe Eng, within 7 days for hospital follow- up.  The following labs/tests are recommended: repeat urine analysis, check blood pressure.     Activity    Your activity upon discharge: activity as tolerated     Discharge Instructions    Self cath at least 4 times a day     Diet    Follow this diet  upon discharge: Orders Placed This Encounter      Combination Diet Regular Diet Adult       Discharge Disposition   Discharged to home with home care PT, OT, nursing, aide as he refused to TCU  Condition at discharge: Stable      Consultations This Hospital Stay   PHYSICAL THERAPY ADULT IP CONSULT  OCCUPATIONAL THERAPY ADULT IP CONSULT  CARE MANAGEMENT / SOCIAL WORK IP CONSULT    Code Status   Full Code    Time Spent on this Encounter   I, Edwar Davila MD, personally saw the patient today and spent less than or equal to 30 minutes discharging this patient.  Discussed with nursing, social work and case management          This document was created using voice recognition technology.  Please excuse any typographical errors that may have occurred.  Please call with any questions.       Edwar Davila MD  [facility]  [address] [place] [address]  Phone: [phone]  Fax: [phone]  ______________________________________________________________________    Physical Exam   Vital Signs: Temp: 97.8  F (36.6  C) Temp src: Temporal BP: 120/43 Pulse: 58   Resp: 20 SpO2: 92 % O2 Device: None (Room air)    Weight: 210 lbs 8.63 oz    Exam isunchanged from yesterday  General appearance: Patient is alert and oriented x3, no apparent distress, pleasant and conversing normally, speaking in full sentences, appears but is elderly and frail stated age sitting up in bed  HEENT:    Mucous membranes are moist  RESPIRATORY: Clear to auscultation bilateral, good air movement  CARDIOVASCULAR: Currently sounds regular rate and rhythm,  no murmurs    GASTROINTESTINAL: Non-distended, non-tender, soft, bowel sounds present throughout,   NEUROLOGIC:  Cranial nerves II-XII intact, without any focal deficits, strength 5/5 throughout  EXTREMITIES:  Moves all extremities, no clubbing, cyanosis, nor edema, patient with some knee pain but no effusion or redness noted  : Patient self  caths.      Discharge Medications   Current Discharge Medication List        START taking these medications    Details   nitroFURantoin macrocrystal-monohydrate (MACROBID) 100 MG capsule Take 1 capsule (100 mg) by mouth every 12 hours for 8 days  Qty: 16 capsule, Refills: 0    Associated Diagnoses: Urinary retention           CONTINUE these medications which have CHANGED    Details   metoprolol tartrate (LOPRESSOR) 25 MG tablet Take 1 tablet (25 mg) by mouth 2 times daily  Qty: 60 tablet, Refills: 0    Associated Diagnoses: Hypertension, unspecified type           CONTINUE these medications which have NOT CHANGED    Details   Cholecalciferol (VITAMIN D) 2000 UNITS CAPS Take 2,000 Units by mouth daily       furosemide (LASIX) 20 MG tablet Take 20 mg by mouth 2 times daily      gabapentin (NEURONTIN) 100 MG capsule Take 100 mg by mouth 2 times daily      glipiZIDE (GLUCOTROL XL) 10 MG 24 hr tablet Take 20 mg by mouth daily       levothyroxine (SYNTHROID/LEVOTHROID) 112 MCG tablet Take 112 mcg by mouth daily      losartan (COZAAR) 50 MG tablet Take 50 mg by mouth daily      meclizine (ANTIVERT) 25 MG tablet Take 25 mg by mouth 3 times daily as needed      metFORMIN (GLUCOPHAGE) 1000 MG tablet Take 1,000 mg by mouth 2 times daily (with meals)      piroxicam (FELDENE) 20 MG capsule Take 20 mg by mouth daily as needed      rivaroxaban ANTICOAGULANT (XARELTO) 20 MG TABS tablet Take 20 mg by mouth daily (with dinner)      simvastatin (ZOCOR) 40 MG tablet Take 40 mg by mouth At Bedtime      sitagliptin (JANUVIA) 100 MG tablet Take 100 mg by mouth every evening      tamsulosin (FLOMAX) 0.4 MG capsule Take 0.4 mg by mouth 2 times daily           STOP taking these medications       cefadroxil (DURICEF) 500 MG capsule Comments:   Reason for Stopping:             Allergies   Allergies   Allergen Reactions    Shellfish Allergy Anaphylaxis    Iodinated Contrast Media Hives    Levaquin [Levofloxacin] Hallucination    Morphine And  Codeine Nausea and Vomiting    Prednisone GI Disturbance     Unstable blood sugars    Tylenol W/Codeine [Acetaminophen-Codeine] Nausea and Vomiting     Weakness

## 2024-08-14 NOTE — PLAN OF CARE
"Goal Outcome Evaluation:           Overall Patient Progress: improvingOverall Patient Progress: improving    Outcome Evaluation: Contiue to progress with therapy/ possible need for TCU    A&O x 4. BG monitoring- 211 and 207. Refusing insulin correction. Self  intermittent cath x 2 for 375 and 600. 1 large, loose BM. Ambulating 1A, GB, FWW. Continues on po ABX's. VSS on RA. Afebrile.   Problem: Adult Inpatient Plan of Care  Goal: Plan of Care Review  Description: The Plan of Care Review/Shift note should be completed every shift.  The Outcome Evaluation is a brief statement about your assessment that the patient is improving, declining, or no change.  This information will be displayed automatically on your shift  note.  Outcome: Progressing  Flowsheets (Taken 8/14/2024 0343)  Outcome Evaluation: Contiue to progress with therapy/ possible need for TCU  Overall Patient Progress: improving  Goal: Patient-Specific Goal (Individualized)  Description: You can add care plan individualizations to a care plan. Examples of Individualization might be:  \"Parent requests to be called daily at 9am for status\", \"I have a hard time hearing out of my right ear\", or \"Do not touch me to wake me up as it startles  me\".  Outcome: Progressing  Goal: Absence of Hospital-Acquired Illness or Injury  Outcome: Progressing  Intervention: Identify and Manage Fall Risk  Recent Flowsheet Documentation  Taken 8/13/2024 2000 by Maryam Andujar, RN  Safety Promotion/Fall Prevention:   activity supervised   assistive device/personal items within reach   clutter free environment maintained   nonskid shoes/slippers when out of bed   safety round/check completed  Intervention: Prevent Infection  Recent Flowsheet Documentation  Taken 8/13/2024 2000 by Maryam Andujar, RN  Infection Prevention:   rest/sleep promoted   single patient room provided  Goal: Optimal Comfort and Wellbeing  Outcome: Progressing  Intervention: Monitor Pain and " Promote Comfort  Recent Flowsheet Documentation  Taken 8/13/2024 2013 by Maryam Andujar, RN  Pain Management Interventions: medication offered but refused  Goal: Readiness for Transition of Care  Outcome: Progressing     Problem: Fall Injury Risk  Goal: Absence of Fall and Fall-Related Injury  Outcome: Progressing  Intervention: Identify and Manage Contributors  Recent Flowsheet Documentation  Taken 8/13/2024 2000 by Maryam Andujar, RN  Medication Review/Management: medications reviewed  Intervention: Promote Injury-Free Environment  Recent Flowsheet Documentation  Taken 8/13/2024 2000 by Maryam Andujar, RN  Safety Promotion/Fall Prevention:   activity supervised   assistive device/personal items within reach   clutter free environment maintained   nonskid shoes/slippers when out of bed   safety round/check completed     Problem: Fatigue  Goal: Improved Activity Tolerance  Outcome: Progressing

## 2024-08-15 ENCOUNTER — LAB REQUISITION (OUTPATIENT)
Dept: LAB | Facility: CLINIC | Age: 84
End: 2024-08-15
Payer: MEDICARE

## 2024-08-15 VITALS
HEIGHT: 68 IN | BODY MASS INDEX: 31.91 KG/M2 | WEIGHT: 210.54 LBS | DIASTOLIC BLOOD PRESSURE: 92 MMHG | OXYGEN SATURATION: 95 % | SYSTOLIC BLOOD PRESSURE: 152 MMHG | TEMPERATURE: 97.8 F | HEART RATE: 69 BPM | RESPIRATION RATE: 16 BRPM

## 2024-08-15 DIAGNOSIS — Z11.1 ENCOUNTER FOR SCREENING FOR RESPIRATORY TUBERCULOSIS: ICD-10-CM

## 2024-08-15 LAB
GLUCOSE BLDC GLUCOMTR-MCNC: 252 MG/DL (ref 70–99)
GLUCOSE BLDC GLUCOMTR-MCNC: 254 MG/DL (ref 70–99)
GLUCOSE BLDC GLUCOMTR-MCNC: 302 MG/DL (ref 70–99)

## 2024-08-15 PROCEDURE — 99207 PR NO CHARGE LOS: CPT | Performed by: INTERNAL MEDICINE

## 2024-08-15 PROCEDURE — 99232 SBSQ HOSP IP/OBS MODERATE 35: CPT | Performed by: INTERNAL MEDICINE

## 2024-08-15 PROCEDURE — 250N000013 HC RX MED GY IP 250 OP 250 PS 637: Performed by: INTERNAL MEDICINE

## 2024-08-15 RX ADMIN — GLIPIZIDE 20 MG: 10 TABLET, EXTENDED RELEASE ORAL at 10:20

## 2024-08-15 RX ADMIN — GABAPENTIN 100 MG: 100 CAPSULE ORAL at 10:21

## 2024-08-15 RX ADMIN — NITROFURANTOIN MONOHYDRATE/MACROCRYSTALS 100 MG: 75; 25 CAPSULE ORAL at 10:21

## 2024-08-15 RX ADMIN — LEVOTHYROXINE SODIUM 112 MCG: 0.11 TABLET ORAL at 10:21

## 2024-08-15 RX ADMIN — TAMSULOSIN HYDROCHLORIDE 0.4 MG: 0.4 CAPSULE ORAL at 10:21

## 2024-08-15 RX ADMIN — FUROSEMIDE 20 MG: 20 TABLET ORAL at 10:21

## 2024-08-15 RX ADMIN — FUROSEMIDE 20 MG: 20 TABLET ORAL at 15:36

## 2024-08-15 RX ADMIN — METFORMIN HYDROCHLORIDE 1000 MG: 500 TABLET ORAL at 10:21

## 2024-08-15 RX ADMIN — METOPROLOL TARTRATE 25 MG: 25 TABLET, FILM COATED ORAL at 10:20

## 2024-08-15 ASSESSMENT — ACTIVITIES OF DAILY LIVING (ADL)
ADLS_ACUITY_SCORE: 31
ADLS_ACUITY_SCORE: 30
ADLS_ACUITY_SCORE: 31
ADLS_ACUITY_SCORE: 31
ADLS_ACUITY_SCORE: 30
ADLS_ACUITY_SCORE: 31
ADLS_ACUITY_SCORE: 30
ADLS_ACUITY_SCORE: 30

## 2024-08-15 NOTE — PLAN OF CARE
Occupational Therapy Discharge Summary    Reason for therapy discharge:    Discharged to transitional care facility.    Progress towards therapy goal(s). See goals on Care Plan in [facility] electronic health record for goal details.  Goals partially met.  Barriers to achieving goals:   discharge from facility.    Therapy recommendation(s):    Continued therapy is recommended.  Rationale/Recommendations:  Pt currently is below his stated baseline of being independent at home with cane or walker, caring and supervising spouse's needs. Pt states spouse is not able to help with his physical needs, will need to get back to his baseline to go home from hosptial. Pt would benefit from TCU, pt is in agreement, concerned with spouse's needs, plans to discuss options with SW..      **Pt not seen by writer on this date, note written based on previous treating OT's note and recommendations.

## 2024-08-15 NOTE — PLAN OF CARE
Physical Therapy Discharge Summary    Reason for therapy discharge:    Discharged to transitional care facility.    Progress towards therapy goal(s). See goals on Care Plan in [facility] electronic health record for goal details.  Goals not met.  Barriers to achieving goals:   discharge from facility.  At time of discharge patient CGA with all mobility, ambulating 90' with decreased LE strength and heavy reliance FWW with UEs.  Therapy recommendation(s):    Continued therapy is recommended.  Rationale/Recommendations:   .  Per chart review, physical therapy recommended in the TCU setting to increase strength and balance.  Goal Outcome Evaluation:

## 2024-08-15 NOTE — PROGRESS NOTES
Care Management Discharge Note    Discharge Date: 08/15/2024       Discharge Disposition:  TCU    Discharge Services:  rehab    Discharge DME:  none    Discharge Transportation: agency    Private pay costs discussed: transportation costs    Does the patient's insurance plan have a 3 day qualifying hospital stay waiver?  No    PAS Confirmation Code: [id number]  Patient/family educated on Medicare website which has current facility and service quality ratings:  yes    Education Provided on the Discharge Plan:  yes  Persons Notified of Discharge Plans: patient  Patient/Family in Agreement with the Plan:  yes    Handoff Referral Completed: No    Additional Information:  Patient elected to go to TCU versus home. Wanted either [facility] or [place]. [facility] has a bed and can accept today. [facility] does not have beds. Patient agreeable tot go to [place].   Reviewed out of pocket cost for [facility] transport, $89.98 base and $5.79 per mile to the destination. Spoke with patient, they expressed understanding and are agreeable to this.    Wheelchair transport set up for [phone]. Updated care team, [place] and patient.  Discharge orders faxed to [place]. PAS completed.     Elizabeth Romero RN  Care Coordinator  [facility]

## 2024-08-15 NOTE — PROGRESS NOTES
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  History of atrial fibrillation  Sinus bradycardia  Sinus pauses  Acute generalized weakness  Deconditioning  History of falls  Polypharmacy  Hypothyroidism           Hospital Course    83 year old gentleman with atrial fib, hypertension, type 2 diabetes mellitus with peripheral neuropathy, hypercholesteremia, hypothyroidism, ABDIEL, BPH, OA, benign cranial nerve tumor, multiple falls, bladder tumor s/p transurethral bladder tumor resection 1/3/2024 who came to [facility] ER on 8/11/24 for concerns about generalized weakness and had a fall when he got up from a bench and the concrete was sloped down and found to have lactic acidosis and likely sepsis.      Patient was recently seen in the outpatient setting on 8/8/2024 for similar concerns that he presented with today.  He was also complaining of abdominal pain at that visit.  CT scan of the abdomen pelvis was performed which showed no acute findings, though there was some subtle asymmetric wall thickening involving the right posterior lateral urinary bladder wall.  Patient does have known history of bladder cancer and underwent tumor resection in 1/24.  He also has urine retention requiring intermittent straight catheterization.  The patient was prescribed Duricef for presumed UTI/pyelonephritis.  He is taken 3 doses of the medication with no significant improvement of his symptoms .      Presentation in the ER, vital signs notable for systolic blood pressure 130, heart rate near 65, no fever, and no hypoxia.  Pertinent labs in the ER today included abnormal  urinalysis with pyuria and hematuria, with large leukocyte esterase present.  Urine culture obtained and pending.  Peripheral white blood cell count near 17.  PCR testing for COVID influenza and RSV negative.  Complete metabolic panel rather unremarkable..  Troponin within normal limits.  CK normal.  Lactic acid elevated at 3.6.   Imaging in the emergency room included a chest x-ray showing no acute findings.  Patient was placed on rocephin empirically and admitted to the hospital.        8/15/24;  Patient was kept extra night because the patient can decide if he was going home versus going to a TCU.  He ended up declining a TCU and will be going home with home care.  No changes in his plan as below.  Orders reviewed and updated.       1.  Early Sepsis (leukocytosis, lactic acidosis) due to complicated UTI due to straight caths and uncontrolled diabetes   Patient presented with a fall and weakness. He was  non-toxic appearing.  Treat with Unasyn (history of Enterococcus in previous urine culture) after getting a dose of rocephin in the emergency room.  Both blood and urine cultures are negative to date.  Patient was given IV fluids.  His lactic acid went from 3.6 down to 2.3 and did not appear all that ill.  Previous urine culture 2/24 grew Enterococcus and Aerococcus; Enterococcus was pansensitive to all antibiotics tested (including penicillin).  He was switched to a course of oral Macrobid.  Urine culture showing vick.  Patient is doing better, will finish his course of antibiotics.  Sepsis was suspected on admission and has improved.  Patient has only been self cathing only 1-2 times a day which leads to urinary retention increased risk of infection.  He has been instructed to cath himself at least 4 times a day.     2.  Chronic urine retention requiring intermittent straight catheterization  Per report straight caths.  Needs to do this more often to prevent urinary retention.  This puts the patient at high  risk for infections if he does not cath regularly.  Patient was instructed to catheterize least 4 times a day.    Patient to be going home with home care including nursing to educate him on this fact to make sure he is compliant.     3.  History of bladder cancer, s/p resection 1/24  Follows with Dr. Daniels of urology.  Received BCG treatments. Reports he is cancer free and has frequent monitoring cystoscopy, next scheduled on 8/14/2024.  Patient did have subtle thickening of the bladder wall on imaging in the emergency room.     4.  Uncontrolled diabetes mellitus type II with peripheral neuropathy of lower extremities  Insulin sliding scale as needed which he is refusing.  Patient will continue on his home Glucotrol, Januvia, and will resume his metformin.  His elevated blood sugars prevent high risk for infections.  His blood sugars are uncontrolled and this is likely contributing factor to his UTI. Hemoglobin A1c 7.6 as of 8/11/24.  Patient's blood sugars are improving down to 222.   Blood sugars were up to 312 and again he has been refusing insulin.     5.  Atrial fibrillation, sinus bradycardia, sinus pause 2 seconds  Chronically treated with Xarelto for anticoagulation, is on a beta-blocker for rate control patient was followed on telemetry and will be decreasing his beta-blocker dose due to bradycardia down to 42 along with a sinus pause.  This could be contributing factor to his weakness and falls.  Pulse while awake is 54-60s with his decreased beta blocker dose.      6.  Acute on chronic generalized weakness/deconditioning resulting in frequent falls, polypharmacy  Patient with a non-focal neuro exam but found to likely have a UTI also polypharmacy could be playing a role.  PT and OT have been consulted.  Patient may require TCU placement as he resides in assisted living.  He does use a 4 wheeled walker.  Could consider an MRI of the lumbar spine if patient fails to improve with treatment of UTI or  "demonstrates focal LE weakness on exam given his history of his bladder ca.   Therapy recommending tcu at discharge though the patient is refusing this as he has a primary care provider for his wife.  Patient will be going home with home care instead at his request.      7.  Hypothyroidism              TSH was normal at 2.67     8.  History of benign brain tumor  Followed at outside institution; scant details on this currently. Has had follow-up brain imaging showing no growth. Untreated              4Ms:  Polypharmacy: Medications reviewed.  At this point patient is on a statin which I would recommend getting rid of as the risk outweighs the benefits especially in the setting of weakness and falls.  His beta-blocker dose will be cut in dose as well as his bradycardia/sinus pauses could be playing a role  Mentation:  Capacity seems intact make his own medical decision  Social support: Per report lives in assisted living with his wife  Mobility: Uses a 4 wheeled walker  What is importmant: N/A          Discussed plan of care with nursing, social work, case management      Diet: Combination Diet Regular Diet Adult  Diet    Pemberton Catheter: Not present  Lines: None     Cardiac Monitoring: None  Code Status: Full Code         This document was created using voice recognition technology.  Please excuse any typographical errors that may have occurred.  Please call with any questions.       Clinically Significant Risk Factors                  # Hypertension: Noted on problem list          # DMII: A1C = 7.6 % (Ref range: <5.7 %) within past 6 months, PRESENT ON ADMISSION  # Obesity: Estimated body mass index is 32.01 kg/m  as calculated from the following:    Height as of this encounter: 1.727 m (5' 8\").    Weight as of this encounter: 95.5 kg (210 lb 8.6 oz)., PRESENT ON ADMISSION     # Financial/Environmental Concerns: none               Disposition Plan      Expected Discharge Date: 08/15/2024      Destination: home with " help/services  Discharge Comments: From Assisted Living - will need TCU  Does not want TCU - cares for wife at home          Barrier to discharge: None, patient has to agree to leave, recommended TCU but patient declined will be returning home with his wife  Medically Ready for Discharge: Ready Now      Edwar Davila MD  Hospitalist Service  [facility]  Securely message with [employer] (more info)  Text page via [employer] Paging/Directory   ______________________________________________________________________    Interval History   No changes overnight.  Patient was discharged yesterday though it sound like he was talked into going to a TCU which she initially was agreeable to but now again is refusing and wanting to go home.  Will need to go home with home care.  Patient wants to be able to take care of his wife who is home alone.      ROS: A comprehensive review of systems was negative except for items noted in the HPI.  Patient currently denies any fever, chills, sweats, nausea, vomiting, diarrhea, shortness of breath, or chest pain.      Physical Exam   Vital Signs: Temp: 97.7  F (36.5  C) Temp src: Temporal BP: (!) 142/84 Pulse: 60   Resp: 16 SpO2: 95 % O2 Device: None (Room air)    Weight: 210 lbs 8.63 oz    Exam is stable from yesterday  General appearance: Patient is alert and oriented x3, no apparent distress, pleasant and conversing normally, speaking in full sentences, appears but is elderly and frail stated age, lying flat in bed  HEENT:    Mucous membranes are moist  RESPIRATORY: Clear to auscultation bilateral, good air movement  CARDIOVASCULAR: Currently sounds regular rate and rhythm,  no murmurs    GASTROINTESTINAL: Non-distended, non-tender, soft, bowel sounds present throughout,   NEUROLOGIC:  Cranial nerves II-XII intact, without any focal deficits, strength 5/5 throughout  EXTREMITIES:  Moves all extremities, no clubbing, cyanosis, nor edema, patient with some knee pain but no  effusion or redness noted  : Patient self caths.      Data         Imaging:   Results for orders placed or performed during the hospital encounter of 08/11/24   XR Chest 2 Views    Narrative    EXAM: XR CHEST 2 VIEWS  LOCATION: [facility]  DATE: 8/11/2024    INDICATION: Cough, weakness.  COMPARISON: 8/8/2024      Impression    IMPRESSION: Heart size and pulmonary vascularity within normal limits. Hyperinflation of the lungs. Minimal atelectasis left lung base. Aortic calcification. Hypertrophic changes thoracic spine. Metallic density left anterior chest wall stable.     Procedures: None  I have personally have reviewed the patient's most up to date labs, orders, and medications myself

## 2024-08-15 NOTE — PLAN OF CARE
"Goal Outcome Evaluation:      Plan of Care Reviewed With: patient    Overall Patient Progress: no changeOverall Patient Progress: no change       0947-1290.  Pt calm and cooperative. RA. Refusing insulin.   Pt straight caths once a day baseline. RN straight cathed pt at 0321 and removed 800 ml  Discharge to TCU today pending placement.      Problem: Adult Inpatient Plan of Care  Goal: Plan of Care Review  Description: The Plan of Care Review/Shift note should be completed every shift.  The Outcome Evaluation is a brief statement about your assessment that the patient is improving, declining, or no change.  This information will be displayed automatically on your shift  note.  Outcome: Progressing  Flowsheets (Taken 8/15/2024 0151)  Plan of Care Reviewed With: patient  Overall Patient Progress: no change  Goal: Patient-Specific Goal (Individualized)  Description: You can add care plan individualizations to a care plan. Examples of Individualization might be:  \"Parent requests to be called daily at 9am for status\", \"I have a hard time hearing out of my right ear\", or \"Do not touch me to wake me up as it startles  me\".  Outcome: Progressing  Goal: Absence of Hospital-Acquired Illness or Injury  Outcome: Progressing  Intervention: Identify and Manage Fall Risk  Recent Flowsheet Documentation  Taken 8/14/2024 2019 by Naif Gilman RN  Safety Promotion/Fall Prevention:   activity supervised   assistive device/personal items within reach   clutter free environment maintained   nonskid shoes/slippers when out of bed   safety round/check completed  Intervention: Prevent Skin Injury  Recent Flowsheet Documentation  Taken 8/14/2024 2019 by Naif Gilman RN  Body Position: position changed independently  Skin Protection: adhesive use limited  Device Skin Pressure Protection: absorbent pad utilized/changed  Intervention: Prevent and Manage VTE (Venous Thromboembolism) Risk  Recent Flowsheet Documentation  Taken 8/14/2024 " 2019 by Naif Gilman, RN  VTE Prevention/Management:   patient refused intervention   SCDs off (sequential compression devices)  Intervention: Prevent Infection  Recent Flowsheet Documentation  Taken 8/14/2024 2019 by Naif Gilman, RN  Infection Prevention:   rest/sleep promoted   single patient room provided  Goal: Optimal Comfort and Wellbeing  Outcome: Progressing  Goal: Readiness for Transition of Care  Outcome: Progressing

## 2024-08-15 NOTE — PLAN OF CARE
"1630 discharge to AV TCU. Patient in agreement. Patient had no questions.   Legs are weak. Able to take 2 walks but needing 2 assist, GB, walker. Knees want to give out. Numbness to legs. Blood sugar elevated \"I don't take the insulin.\"  Straight cath for 750cc.  Plan is to discharge today to AV TCU and patient in agreement. AVS given to patient and verbalized understanding of discharge instructions. ]  Pleasant, oriented x4.  Ate meals and drinking fluids.   Denies pin.   Oral antibiotic.   aMrk is in agreement of discharge plan.   Problem: Adult Inpatient Plan of Care  Goal: Plan of Care Review  Description: The Plan of Care Review/Shift note should be completed every shift.  The Outcome Evaluation is a brief statement about your assessment that the patient is improving, declining, or no change.  This information will be displayed automatically on your shift  note.  Outcome: Adequate for Care Transition  Flowsheets (Taken 8/15/2024 1425)  Plan of Care Reviewed With: patient  Overall Patient Progress: improving  Goal: Patient-Specific Goal (Individualized)  Description: You can add care plan individualizations to a care plan. Examples of Individualization might be:  \"Parent requests to be called daily at 9am for status\", \"I have a hard time hearing out of my right ear\", or \"Do not touch me to wake me up as it startles  me\".  Outcome: Adequate for Care Transition  Goal: Absence of Hospital-Acquired Illness or Injury  Outcome: Adequate for Care Transition  Intervention: Identify and Manage Fall Risk  Recent Flowsheet Documentation  Taken 8/15/2024 1100 by Marjorie Galarza, RN  Safety Promotion/Fall Prevention:   activity supervised   assistive device/personal items within reach   clutter free environment maintained   nonskid shoes/slippers when out of bed   safety round/check completed  Intervention: Prevent Skin Injury  Recent Flowsheet Documentation  Taken 8/15/2024 1100 by Marjorie Galarza, RN  Body " Position: position changed independently  Skin Protection: adhesive use limited  Device Skin Pressure Protection: absorbent pad utilized/changed  Intervention: Prevent and Manage VTE (Venous Thromboembolism) Risk  Recent Flowsheet Documentation  Taken 8/15/2024 1100 by Marjorie Galarza RN  VTE Prevention/Management:   patient refused intervention   SCDs off (sequential compression devices)  Intervention: Prevent Infection  Recent Flowsheet Documentation  Taken 8/15/2024 1100 by Marjorie Galarza RN  Infection Prevention:   rest/sleep promoted   single patient room provided  Goal: Optimal Comfort and Wellbeing  Outcome: Adequate for Care Transition  Goal: Readiness for Transition of Care  Outcome: Adequate for Care Transition     Problem: Fall Injury Risk  Goal: Absence of Fall and Fall-Related Injury  Outcome: Adequate for Care Transition  Intervention: Identify and Manage Contributors  Recent Flowsheet Documentation  Taken 8/15/2024 1100 by Marjorie Galarza RN  Medication Review/Management: medications reviewed  Intervention: Promote Injury-Free Environment  Recent Flowsheet Documentation  Taken 8/15/2024 1100 by Marjorie Galarza RN  Safety Promotion/Fall Prevention:   activity supervised   assistive device/personal items within reach   clutter free environment maintained   nonskid shoes/slippers when out of bed   safety round/check completed     Problem: Fatigue  Goal: Improved Activity Tolerance  Outcome: Adequate for Care Transition  Intervention: Promote Improved Energy  Recent Flowsheet Documentation  Taken 8/15/2024 1100 by Marjorie Galarza RN  Activity Management: ambulated outside room   Goal Outcome Evaluation:      Plan of Care Reviewed With: patient    Overall Patient Progress: improvingOverall Patient Progress: improving

## 2024-08-15 NOTE — PROGRESS NOTES
Update    Patient finally decided to go to a TCU. Orders and discharge summary updated    Discussed with social work    Edwar Davila MD

## 2024-08-16 ENCOUNTER — LAB REQUISITION (OUTPATIENT)
Dept: LAB | Facility: CLINIC | Age: 84
End: 2024-08-16
Payer: MEDICARE

## 2024-08-16 DIAGNOSIS — D72.829 ELEVATED WHITE BLOOD CELL COUNT, UNSPECIFIED: ICD-10-CM

## 2024-08-16 DIAGNOSIS — I10 ESSENTIAL (PRIMARY) HYPERTENSION: ICD-10-CM

## 2024-08-16 LAB — BACTERIA BLD CULT: NO GROWTH

## 2024-08-19 LAB
ACANTHOCYTES BLD QL SMEAR: ABNORMAL
ANION GAP SERPL CALCULATED.3IONS-SCNC: 12 MMOL/L (ref 7–15)
AUER BODIES BLD QL SMEAR: ABNORMAL
BASO STIPL BLD QL SMEAR: ABNORMAL
BASOPHILS # BLD AUTO: 0.1 10E3/UL (ref 0–0.2)
BASOPHILS NFR BLD AUTO: 1 %
BITE CELLS BLD QL SMEAR: ABNORMAL
BLISTER CELLS BLD QL SMEAR: ABNORMAL
BUN SERPL-MCNC: 21.6 MG/DL (ref 8–23)
BURR CELLS BLD QL SMEAR: ABNORMAL
CALCIUM SERPL-MCNC: 9.4 MG/DL (ref 8.8–10.4)
CHLORIDE SERPL-SCNC: 100 MMOL/L (ref 98–107)
CREAT SERPL-MCNC: 1.04 MG/DL (ref 0.67–1.17)
DACRYOCYTES BLD QL SMEAR: ABNORMAL
EGFRCR SERPLBLD CKD-EPI 2021: 71 ML/MIN/1.73M2
ELLIPTOCYTES BLD QL SMEAR: ABNORMAL
EOSINOPHIL # BLD AUTO: 0.4 10E3/UL (ref 0–0.7)
EOSINOPHIL NFR BLD AUTO: 3 %
ERYTHROCYTE [DISTWIDTH] IN BLOOD BY AUTOMATED COUNT: 13.6 % (ref 10–15)
FRAGMENTS BLD QL SMEAR: ABNORMAL
GLUCOSE SERPL-MCNC: 224 MG/DL (ref 70–99)
HCO3 SERPL-SCNC: 25 MMOL/L (ref 22–29)
HCT VFR BLD AUTO: 40.3 % (ref 40–53)
HGB BLD-MCNC: 13.6 G/DL (ref 13.3–17.7)
HGB C CRYSTALS: ABNORMAL
HOWELL-JOLLY BOD BLD QL SMEAR: ABNORMAL
IMM GRANULOCYTES # BLD: 0 10E3/UL
IMM GRANULOCYTES NFR BLD: 0 %
LYMPHOCYTES # BLD AUTO: 8 10E3/UL (ref 0.8–5.3)
LYMPHOCYTES NFR BLD AUTO: 55 %
MCH RBC QN AUTO: 34.3 PG (ref 26.5–33)
MCHC RBC AUTO-ENTMCNC: 33.7 G/DL (ref 31.5–36.5)
MCV RBC AUTO: 102 FL (ref 78–100)
MONOCYTES # BLD AUTO: 0.8 10E3/UL (ref 0–1.3)
MONOCYTES NFR BLD AUTO: 5 %
NEUTROPHILS # BLD AUTO: 5.2 10E3/UL (ref 1.6–8.3)
NEUTROPHILS NFR BLD AUTO: 36 %
NEUTS HYPERSEG BLD QL SMEAR: ABNORMAL
NRBC # BLD AUTO: 0 10E3/UL
NRBC BLD AUTO-RTO: 0 /100
PLAT MORPH BLD: ABNORMAL
PLATELET # BLD AUTO: 233 10E3/UL (ref 150–450)
POLYCHROMASIA BLD QL SMEAR: ABNORMAL
POTASSIUM SERPL-SCNC: 4 MMOL/L (ref 3.4–5.3)
RBC # BLD AUTO: 3.97 10E6/UL (ref 4.4–5.9)
RBC AGGLUT BLD QL: ABNORMAL
RBC MORPH BLD: ABNORMAL
ROULEAUX BLD QL SMEAR: ABNORMAL
SICKLE CELLS BLD QL SMEAR: ABNORMAL
SMUDGE CELLS BLD QL SMEAR: ABNORMAL
SODIUM SERPL-SCNC: 137 MMOL/L (ref 135–145)
SPHEROCYTES BLD QL SMEAR: ABNORMAL
STOMATOCYTES BLD QL SMEAR: ABNORMAL
TARGETS BLD QL SMEAR: ABNORMAL
TOXIC GRANULES BLD QL SMEAR: ABNORMAL
VARIANT LYMPHS BLD QL SMEAR: PRESENT
WBC # BLD AUTO: 14.5 10E3/UL (ref 4–11)

## 2024-08-19 PROCEDURE — P9604 ONE-WAY ALLOW PRORATED TRIP: HCPCS

## 2024-08-19 PROCEDURE — 82565 ASSAY OF CREATININE: CPT

## 2024-08-19 PROCEDURE — 36415 COLL VENOUS BLD VENIPUNCTURE: CPT

## 2024-08-19 PROCEDURE — 85025 COMPLETE CBC W/AUTO DIFF WBC: CPT

## 2024-08-19 PROCEDURE — 86481 TB AG RESPONSE T-CELL SUSP: CPT | Performed by: FAMILY MEDICINE

## 2024-08-20 LAB
GAMMA INTERFERON BACKGROUND BLD IA-ACNC: 0.01 IU/ML
M TB IFN-G BLD-IMP: NEGATIVE
M TB IFN-G CD4+ BCKGRND COR BLD-ACNC: 9.99 IU/ML
MITOGEN IGNF BCKGRD COR BLD-ACNC: 0.02 IU/ML
MITOGEN IGNF BCKGRD COR BLD-ACNC: 0.04 IU/ML
QUANTIFERON MITOGEN: 10 IU/ML
QUANTIFERON NIL TUBE: 0.01 IU/ML
QUANTIFERON TB1 TUBE: 0.03 IU/ML
QUANTIFERON TB2 TUBE: 0.05

## 2024-08-28 ENCOUNTER — LAB REQUISITION (OUTPATIENT)
Dept: LAB | Facility: CLINIC | Age: 84
End: 2024-08-28
Payer: MEDICARE

## 2024-08-28 DIAGNOSIS — D72.829 ELEVATED WHITE BLOOD CELL COUNT, UNSPECIFIED: ICD-10-CM

## 2024-08-29 LAB
BASOPHILS # BLD AUTO: ABNORMAL 10*3/UL
BASOPHILS # BLD MANUAL: 0 10E3/UL (ref 0–0.2)
BASOPHILS NFR BLD AUTO: ABNORMAL %
BASOPHILS NFR BLD MANUAL: 0 %
BURR CELLS BLD QL SMEAR: ABNORMAL
EOSINOPHIL # BLD AUTO: ABNORMAL 10*3/UL
EOSINOPHIL # BLD MANUAL: 0.5 10E3/UL (ref 0–0.7)
EOSINOPHIL NFR BLD AUTO: ABNORMAL %
EOSINOPHIL NFR BLD MANUAL: 4 %
ERYTHROCYTE [DISTWIDTH] IN BLOOD BY AUTOMATED COUNT: 13.3 % (ref 10–15)
HCT VFR BLD AUTO: 39.1 % (ref 40–53)
HGB BLD-MCNC: 13.1 G/DL (ref 13.3–17.7)
IMM GRANULOCYTES # BLD: ABNORMAL 10*3/UL
IMM GRANULOCYTES NFR BLD: ABNORMAL %
LYMPHOCYTES # BLD AUTO: ABNORMAL 10*3/UL
LYMPHOCYTES # BLD MANUAL: 7.3 10E3/UL (ref 0.8–5.3)
LYMPHOCYTES NFR BLD AUTO: ABNORMAL %
LYMPHOCYTES NFR BLD MANUAL: 55 %
MCH RBC QN AUTO: 34.1 PG (ref 26.5–33)
MCHC RBC AUTO-ENTMCNC: 33.5 G/DL (ref 31.5–36.5)
MCV RBC AUTO: 102 FL (ref 78–100)
MONOCYTES # BLD AUTO: ABNORMAL 10*3/UL
MONOCYTES # BLD MANUAL: 0.1 10E3/UL (ref 0–1.3)
MONOCYTES NFR BLD AUTO: ABNORMAL %
MONOCYTES NFR BLD MANUAL: 1 %
NEUTROPHILS # BLD AUTO: ABNORMAL 10*3/UL
NEUTROPHILS # BLD MANUAL: 5.3 10E3/UL (ref 1.6–8.3)
NEUTROPHILS NFR BLD AUTO: ABNORMAL %
NEUTROPHILS NFR BLD MANUAL: 40 %
NRBC # BLD AUTO: 0 10E3/UL
NRBC BLD AUTO-RTO: 0 /100
PLAT MORPH BLD: ABNORMAL
PLATELET # BLD AUTO: 173 10E3/UL (ref 150–450)
RBC # BLD AUTO: 3.84 10E6/UL (ref 4.4–5.9)
RBC MORPH BLD: ABNORMAL
WBC # BLD AUTO: 13.3 10E3/UL (ref 4–11)

## 2024-08-29 PROCEDURE — P9604 ONE-WAY ALLOW PRORATED TRIP: HCPCS | Performed by: FAMILY MEDICINE

## 2024-08-29 PROCEDURE — 85014 HEMATOCRIT: CPT | Performed by: FAMILY MEDICINE

## 2024-08-29 PROCEDURE — 36415 COLL VENOUS BLD VENIPUNCTURE: CPT | Performed by: FAMILY MEDICINE

## 2024-08-29 PROCEDURE — 85007 BL SMEAR W/DIFF WBC COUNT: CPT | Performed by: FAMILY MEDICINE

## 2024-09-07 ENCOUNTER — LAB REQUISITION (OUTPATIENT)
Dept: LAB | Facility: CLINIC | Age: 84
End: 2024-09-07
Payer: MEDICARE

## 2024-09-07 DIAGNOSIS — I10 ESSENTIAL (PRIMARY) HYPERTENSION: ICD-10-CM

## 2024-09-07 DIAGNOSIS — D72.829 ELEVATED WHITE BLOOD CELL COUNT, UNSPECIFIED: ICD-10-CM

## 2024-09-09 LAB
ANION GAP SERPL CALCULATED.3IONS-SCNC: 10 MMOL/L (ref 7–15)
BUN SERPL-MCNC: 17.6 MG/DL (ref 8–23)
CALCIUM SERPL-MCNC: 9.3 MG/DL (ref 8.8–10.4)
CHLORIDE SERPL-SCNC: 100 MMOL/L (ref 98–107)
CREAT SERPL-MCNC: 1.14 MG/DL (ref 0.67–1.17)
EGFRCR SERPLBLD CKD-EPI 2021: 64 ML/MIN/1.73M2
ERYTHROCYTE [DISTWIDTH] IN BLOOD BY AUTOMATED COUNT: 12.8 % (ref 10–15)
GLUCOSE SERPL-MCNC: 223 MG/DL (ref 70–99)
HCO3 SERPL-SCNC: 29 MMOL/L (ref 22–29)
HCT VFR BLD AUTO: 38.6 % (ref 40–53)
HGB BLD-MCNC: 12.8 G/DL (ref 13.3–17.7)
MCH RBC QN AUTO: 33.8 PG (ref 26.5–33)
MCHC RBC AUTO-ENTMCNC: 33.2 G/DL (ref 31.5–36.5)
MCV RBC AUTO: 102 FL (ref 78–100)
PLATELET # BLD AUTO: 171 10E3/UL (ref 150–450)
POTASSIUM SERPL-SCNC: 4.1 MMOL/L (ref 3.4–5.3)
RBC # BLD AUTO: 3.79 10E6/UL (ref 4.4–5.9)
SODIUM SERPL-SCNC: 139 MMOL/L (ref 135–145)
WBC # BLD AUTO: 13.4 10E3/UL (ref 4–11)

## 2024-09-09 PROCEDURE — 85014 HEMATOCRIT: CPT | Performed by: FAMILY MEDICINE

## 2024-09-09 PROCEDURE — P9604 ONE-WAY ALLOW PRORATED TRIP: HCPCS | Performed by: FAMILY MEDICINE

## 2024-09-09 PROCEDURE — 80048 BASIC METABOLIC PNL TOTAL CA: CPT | Performed by: FAMILY MEDICINE

## 2024-09-09 PROCEDURE — 36415 COLL VENOUS BLD VENIPUNCTURE: CPT | Performed by: FAMILY MEDICINE

## 2024-09-20 ENCOUNTER — OFFICE VISIT (OUTPATIENT)
Dept: UROLOGY | Facility: CLINIC | Age: 84
End: 2024-09-20
Payer: MEDICARE

## 2024-09-20 VITALS — HEIGHT: 68 IN | BODY MASS INDEX: 30.62 KG/M2 | WEIGHT: 202 LBS

## 2024-09-20 DIAGNOSIS — C67.9 MALIGNANT NEOPLASM OF URINARY BLADDER, UNSPECIFIED SITE (H): Primary | ICD-10-CM

## 2024-09-20 DIAGNOSIS — Z79.2 PROPHYLACTIC ANTIBIOTIC: ICD-10-CM

## 2024-09-20 DIAGNOSIS — Z79.2 PROPHYLACTIC ANTIBIOTIC: Primary | ICD-10-CM

## 2024-09-20 PROCEDURE — 99212 OFFICE O/P EST SF 10 MIN: CPT | Mod: 25 | Performed by: UROLOGY

## 2024-09-20 PROCEDURE — 52000 CYSTOURETHROSCOPY: CPT | Performed by: UROLOGY

## 2024-09-20 RX ORDER — SULFAMETHOXAZOLE/TRIMETHOPRIM 800-160 MG
1 TABLET ORAL ONCE
Qty: 1 TABLET | Refills: 0 | Status: SHIPPED | OUTPATIENT
Start: 2024-09-20 | End: 2024-09-20

## 2024-09-20 RX ORDER — SULFAMETHOXAZOLE/TRIMETHOPRIM 800-160 MG
TABLET ORAL
Qty: 6 TABLET | Refills: 0 | Status: SHIPPED | OUTPATIENT
Start: 2024-09-20

## 2024-09-20 RX ORDER — LIDOCAINE HYDROCHLORIDE 20 MG/ML
JELLY TOPICAL ONCE
Status: COMPLETED | OUTPATIENT
Start: 2024-09-20 | End: 2024-09-20

## 2024-09-20 RX ADMIN — LIDOCAINE HYDROCHLORIDE: 20 JELLY TOPICAL at 09:56

## 2024-09-20 ASSESSMENT — PAIN SCALES - GENERAL: PAINLEVEL: MODERATE PAIN (5)

## 2024-09-20 NOTE — PATIENT INSTRUCTIONS

## 2024-09-20 NOTE — PROGRESS NOTES
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  He completed 3 weeks of maintenance BCG in July and he tolerated treatments well.    Cystoscopy:   I performed flexible cystoscopy today and the bladder was normal throughout.  No tumors identified throughout the bladder.    Impression:   Doing well, no evidence of recurrence    Plan:   We discussed the prevention and surveillance plan.  In October he will undergo another 3 weeks of maintenance BCG.  I will see him back in 3 months for his next screening cystoscopy.      Jeremiah Daniels M.D.    "

## 2024-09-20 NOTE — LETTER
"9/20/2024       RE: Mark Shultz  [address]  [facility] [address]     Dear Colleague,    Thank you for referring your patient, Mark Shultz, to the [facility] [place] [facility]. Please see a copy of my visit note below.    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  He completed 3 weeks of maintenance BCG in July and he tolerated treatments well.    Cystoscopy:   I performed flexible cystoscopy today and the bladder was normal throughout.  No tumors identified throughout the bladder.    Impression:   Doing well, no evidence of recurrence    Plan:   We discussed the prevention and surveillance plan.  In October he will undergo another 3 weeks of maintenance BCG.  I will see him back in 3 months for his next screening cystoscopy.      Jeremiah Daniels M.D.      Again, thank you for allowing me to participate in the care of your patient.      Sincerely,    Jeremiah Daniels MD    "

## 2024-09-20 NOTE — NURSING NOTE
Chief Complaint   Patient presents with    Bladder cancer     Cystoscopy     Patient's catheter was disconnected from the leg bag and the balloon deflated. The catheter was removed with no complaints offered by the patient and the procedure was tolerated well.      Prior to the start of the procedure and with procedural staff participation, I verbally confirmed the patient s identity using two indicators, relevant allergies, that the procedure was appropriate and matched the consent or emergent situation, and that the correct equipment/implants were available. Immediately prior to starting the procedure I conducted the Time Out with the procedural staff and re-confirmed the patient s name, procedure, and site/side. I have wiped the patient off with the povidone-Iodine solution, draped them, and used Lidocaine hydrochloride jelly. (The Joint Commission universal protocol was followed.)  Yes    Sedation (Moderate or Deep): None    5mL 2% lidocaine hydrochloride Urojet instilled into urethra.    NDC# [address]-0  Lot #: [id number]  Expiration Date:  3/26    Per Dr. Daniels, pt may resume SIC x 1 daily    Joanne Geronimo

## 2024-09-23 ENCOUNTER — HOSPITAL ENCOUNTER (EMERGENCY)
Facility: CLINIC | Age: 84
Discharge: HOME | End: 2024-09-23
Attending: EMERGENCY MEDICINE | Admitting: EMERGENCY MEDICINE
Payer: MEDICARE

## 2024-09-23 ENCOUNTER — LAB REQUISITION (OUTPATIENT)
Dept: LAB | Facility: CLINIC | Age: 84
End: 2024-09-23
Payer: MEDICARE

## 2024-09-23 VITALS
OXYGEN SATURATION: 95 % | RESPIRATION RATE: 16 BRPM | TEMPERATURE: 98 F | DIASTOLIC BLOOD PRESSURE: 69 MMHG | SYSTOLIC BLOOD PRESSURE: 114 MMHG | HEART RATE: 70 BPM

## 2024-09-23 DIAGNOSIS — N39.0 URINARY TRACT INFECTION ASSOCIATED WITH CATHETERIZATION OF URINARY TRACT, UNSPECIFIED INDWELLING URINARY CATHETER TYPE, INITIAL ENCOUNTER (H): ICD-10-CM

## 2024-09-23 DIAGNOSIS — D64.9 ANEMIA, UNSPECIFIED: ICD-10-CM

## 2024-09-23 DIAGNOSIS — T83.511A URINARY TRACT INFECTION ASSOCIATED WITH CATHETERIZATION OF URINARY TRACT, UNSPECIFIED INDWELLING URINARY CATHETER TYPE, INITIAL ENCOUNTER (H): ICD-10-CM

## 2024-09-23 DIAGNOSIS — R33.9 URINARY RETENTION: ICD-10-CM

## 2024-09-23 DIAGNOSIS — I10 ESSENTIAL (PRIMARY) HYPERTENSION: ICD-10-CM

## 2024-09-23 LAB
ALBUMIN SERPL BCG-MCNC: 4.1 G/DL (ref 3.5–5.2)
ALBUMIN UR-MCNC: 200 MG/DL
ALP SERPL-CCNC: 81 U/L (ref 40–150)
ALT SERPL W P-5'-P-CCNC: 16 U/L (ref 0–70)
ANION GAP SERPL CALCULATED.3IONS-SCNC: 13 MMOL/L (ref 7–15)
APPEARANCE UR: ABNORMAL
AST SERPL W P-5'-P-CCNC: 14 U/L (ref 0–45)
BILIRUB SERPL-MCNC: 0.9 MG/DL
BILIRUB UR QL STRIP: NEGATIVE
BUN SERPL-MCNC: 16 MG/DL (ref 8–23)
CALCIUM SERPL-MCNC: 9.2 MG/DL (ref 8.8–10.4)
CHLORIDE SERPL-SCNC: 100 MMOL/L (ref 98–107)
COLOR UR AUTO: ABNORMAL
CREAT SERPL-MCNC: 1.04 MG/DL (ref 0.67–1.17)
EGFRCR SERPLBLD CKD-EPI 2021: 71 ML/MIN/1.73M2
GLUCOSE SERPL-MCNC: 208 MG/DL (ref 70–99)
GLUCOSE UR STRIP-MCNC: NEGATIVE MG/DL
HCO3 SERPL-SCNC: 23 MMOL/L (ref 22–29)
HGB UR QL STRIP: ABNORMAL
KETONES UR STRIP-MCNC: NEGATIVE MG/DL
LACTATE SERPL-SCNC: 1.2 MMOL/L (ref 0.7–2)
LEUKOCYTE ESTERASE UR QL STRIP: ABNORMAL
NITRATE UR QL: NEGATIVE
PH UR STRIP: 6 [PH] (ref 5–7)
POTASSIUM SERPL-SCNC: 4.2 MMOL/L (ref 3.4–5.3)
PROT SERPL-MCNC: 6.7 G/DL (ref 6.4–8.3)
RBC URINE: 68 /HPF
SODIUM SERPL-SCNC: 136 MMOL/L (ref 135–145)
SP GR UR STRIP: 1.01 (ref 1–1.03)
UROBILINOGEN UR STRIP-MCNC: NORMAL MG/DL
WBC CLUMPS #/AREA URNS HPF: PRESENT /HPF
WBC URINE: >182 /HPF

## 2024-09-23 PROCEDURE — 250N000011 HC RX IP 250 OP 636: Performed by: EMERGENCY MEDICINE

## 2024-09-23 PROCEDURE — 36415 COLL VENOUS BLD VENIPUNCTURE: CPT | Performed by: EMERGENCY MEDICINE

## 2024-09-23 PROCEDURE — 85025 COMPLETE CBC W/AUTO DIFF WBC: CPT | Performed by: EMERGENCY MEDICINE

## 2024-09-23 PROCEDURE — 51702 INSERT TEMP BLADDER CATH: CPT

## 2024-09-23 PROCEDURE — 87086 URINE CULTURE/COLONY COUNT: CPT | Performed by: EMERGENCY MEDICINE

## 2024-09-23 PROCEDURE — 81001 URINALYSIS AUTO W/SCOPE: CPT | Performed by: EMERGENCY MEDICINE

## 2024-09-23 PROCEDURE — 87186 SC STD MICRODIL/AGAR DIL: CPT | Performed by: EMERGENCY MEDICINE

## 2024-09-23 PROCEDURE — 80053 COMPREHEN METABOLIC PANEL: CPT | Performed by: EMERGENCY MEDICINE

## 2024-09-23 PROCEDURE — 250N000013 HC RX MED GY IP 250 OP 250 PS 637: Performed by: EMERGENCY MEDICINE

## 2024-09-23 PROCEDURE — 99284 EMERGENCY DEPT VISIT MOD MDM: CPT | Mod: 25

## 2024-09-23 PROCEDURE — 96365 THER/PROPH/DIAG IV INF INIT: CPT

## 2024-09-23 PROCEDURE — 85007 BL SMEAR W/DIFF WBC COUNT: CPT | Performed by: EMERGENCY MEDICINE

## 2024-09-23 PROCEDURE — 83605 ASSAY OF LACTIC ACID: CPT | Performed by: EMERGENCY MEDICINE

## 2024-09-23 RX ORDER — CEFTRIAXONE 2 G/1
2 INJECTION, POWDER, FOR SOLUTION INTRAMUSCULAR; INTRAVENOUS ONCE
Status: COMPLETED | OUTPATIENT
Start: 2024-09-23 | End: 2024-09-23

## 2024-09-23 RX ADMIN — AMOXICILLIN AND CLAVULANATE POTASSIUM 1 TABLET: 875; 125 TABLET, FILM COATED ORAL at 22:54

## 2024-09-23 RX ADMIN — CEFTRIAXONE SODIUM 2 G: 2 INJECTION, POWDER, FOR SOLUTION INTRAMUSCULAR; INTRAVENOUS at 21:53

## 2024-09-23 ASSESSMENT — ACTIVITIES OF DAILY LIVING (ADL)
ADLS_ACUITY_SCORE: 39

## 2024-09-23 NOTE — ED TRIAGE NOTES
Patient arrived via EMS from [facility] in [place], with complaints of pain, numbness and shaking in his left arm which was improved with application of a warm blanket by staff, and concerns by staff that the patient may have a UTI.

## 2024-09-24 LAB
ANION GAP SERPL CALCULATED.3IONS-SCNC: 10 MMOL/L (ref 7–15)
BASOPHILS # BLD AUTO: 0.1 10E3/UL (ref 0–0.2)
BASOPHILS # BLD MANUAL: 0 10E3/UL (ref 0–0.2)
BASOPHILS NFR BLD AUTO: 0 %
BASOPHILS NFR BLD MANUAL: 0 %
BUN SERPL-MCNC: 16.7 MG/DL (ref 8–23)
CALCIUM SERPL-MCNC: 8.7 MG/DL (ref 8.8–10.4)
CHLORIDE SERPL-SCNC: 102 MMOL/L (ref 98–107)
CREAT SERPL-MCNC: 1.08 MG/DL (ref 0.67–1.17)
EGFRCR SERPLBLD CKD-EPI 2021: 68 ML/MIN/1.73M2
EOSINOPHIL # BLD AUTO: 0.2 10E3/UL (ref 0–0.7)
EOSINOPHIL # BLD MANUAL: 0 10E3/UL (ref 0–0.7)
EOSINOPHIL NFR BLD AUTO: 1 %
EOSINOPHIL NFR BLD MANUAL: 0 %
ERYTHROCYTE [DISTWIDTH] IN BLOOD BY AUTOMATED COUNT: 12.9 % (ref 10–15)
ERYTHROCYTE [DISTWIDTH] IN BLOOD BY AUTOMATED COUNT: 13.3 % (ref 10–15)
GLUCOSE SERPL-MCNC: 185 MG/DL (ref 70–99)
HCO3 SERPL-SCNC: 24 MMOL/L (ref 22–29)
HCT VFR BLD AUTO: 34.6 % (ref 40–53)
HCT VFR BLD AUTO: 37.6 % (ref 40–53)
HGB BLD-MCNC: 11.7 G/DL (ref 13.3–17.7)
HGB BLD-MCNC: 13.3 G/DL (ref 13.3–17.7)
IMM GRANULOCYTES # BLD: 0.1 10E3/UL
IMM GRANULOCYTES NFR BLD: 0 %
LYMPHOCYTES # BLD AUTO: 6.5 10E3/UL (ref 0.8–5.3)
LYMPHOCYTES # BLD MANUAL: 10.4 10E3/UL (ref 0.8–5.3)
LYMPHOCYTES NFR BLD AUTO: 37 %
LYMPHOCYTES NFR BLD MANUAL: 60 %
MCH RBC QN AUTO: 33.4 PG (ref 26.5–33)
MCH RBC QN AUTO: 33.8 PG (ref 26.5–33)
MCHC RBC AUTO-ENTMCNC: 33.8 G/DL (ref 31.5–36.5)
MCHC RBC AUTO-ENTMCNC: 35.4 G/DL (ref 31.5–36.5)
MCV RBC AUTO: 96 FL (ref 78–100)
MCV RBC AUTO: 99 FL (ref 78–100)
MONOCYTES # BLD AUTO: 1.1 10E3/UL (ref 0–1.3)
MONOCYTES # BLD MANUAL: 0.5 10E3/UL (ref 0–1.3)
MONOCYTES NFR BLD AUTO: 6 %
MONOCYTES NFR BLD MANUAL: 3 %
NEUTROPHILS # BLD AUTO: 9.5 10E3/UL (ref 1.6–8.3)
NEUTROPHILS # BLD MANUAL: 6.4 10E3/UL (ref 1.6–8.3)
NEUTROPHILS NFR BLD AUTO: 54 %
NEUTROPHILS NFR BLD MANUAL: 37 %
NRBC # BLD AUTO: 0 10E3/UL
NRBC BLD AUTO-RTO: 0 /100
PATH REV: ABNORMAL
PLAT MORPH BLD: ABNORMAL
PLATELET # BLD AUTO: 207 10E3/UL (ref 150–450)
PLATELET # BLD AUTO: 211 10E3/UL (ref 150–450)
POTASSIUM SERPL-SCNC: 3.9 MMOL/L (ref 3.4–5.3)
RBC # BLD AUTO: 3.5 10E6/UL (ref 4.4–5.9)
RBC # BLD AUTO: 3.93 10E6/UL (ref 4.4–5.9)
RBC MORPH BLD: ABNORMAL
SMUDGE CELLS BLD QL SMEAR: PRESENT
SODIUM SERPL-SCNC: 136 MMOL/L (ref 135–145)
WBC # BLD AUTO: 15.2 10E3/UL (ref 4–11)
WBC # BLD AUTO: 17.4 10E3/UL (ref 4–11)

## 2024-09-24 PROCEDURE — 85018 HEMOGLOBIN: CPT | Performed by: FAMILY MEDICINE

## 2024-09-24 PROCEDURE — P9604 ONE-WAY ALLOW PRORATED TRIP: HCPCS | Performed by: FAMILY MEDICINE

## 2024-09-24 PROCEDURE — 80048 BASIC METABOLIC PNL TOTAL CA: CPT | Performed by: FAMILY MEDICINE

## 2024-09-24 PROCEDURE — 36415 COLL VENOUS BLD VENIPUNCTURE: CPT | Performed by: FAMILY MEDICINE

## 2024-09-24 PROCEDURE — 80051 ELECTROLYTE PANEL: CPT | Performed by: FAMILY MEDICINE

## 2024-09-24 NOTE — ED PROVIDER NOTES
Emergency Department Note      History of Present Illness     Chief Complaint   Arm Pain and Rule out Urinary Tract Infection      HPI   Mark Shultz is a 84 year old male with history of diabetes, TITUS, bladder cancer, urinary retention, hypothyroidism, and neuropathy who presents for evaluation of UTI concerns. Patient reports that in January of this year he had a tumor removal for his bladder cancer followed by a few months of infusions that led to him getting a UTI. After this he needed to have a agarwal catheter placed which was then removed Friday at Urology and he was told to straight cath himself once a day at night. He did this for around 4 months prior to the agarwal but with worsening weakness it has become difficult for him to cath himself.  He feels it is challenge to get help with it at TCU.  Patient states that today he was sitting down for around 20 minutes and began experiencing abdominal pain and the constant need to urinate. He then began feeling cold and his hand was shaking. While at his nursing home he was then cathed by staff and after he was drained he felt normal again. Feels like he might need to cath more often.  He states that he has been seeing neurology who are interested in further tests and x-rays though his home staff raise concerns for UTI. Patient denies any swelling or redness in the groin or hematuria recently.       Independent Historian   None    Review of External Notes   Reviewed a note showing agarwal cath removed 9/20 by urologist but was straight cathed again during the night     Past Medical History     Medical History and Problem List   Diabetes  Chronic atrial fibrillation  Acute kidney injury  Urinary retention  Bladder cancer  Peripheral neuropathy  Obstructive sleep apnea  Left ventricular hypertrophy  Lumbago  Hypothyroidism   Hypertension  Hypercholesterolemia  Benign brain tumor  Benign prostatic hyperplasia  Arthritis  Arrhythmia      Medications    Flomax  Bactrim  Januvia  Zocor  Xarelto  Feldene  Lopressor  Metformin  Cozaar  Levothyroxine  Glipizide  Gabapentin  Lasix       Surgical History   Cystoscopy x2  Cystoscopy, transurethral resection tumor bladder, combined  Laparoscopy, surgical  Appendectomy  Hernia repair      Physical Exam     Patient Vitals for the past 24 hrs:   BP Temp Temp src Pulse Resp SpO2   09/23/24 1848 126/59 98  F (36.7  C) Temporal 79 18 97 %     Physical Exam  Eyes:  Sclera white; Pupils are equal and round  ENT:    External ears and nares normal  CV:  Rate as above with regular rhythm   Resp:  Breath sounds clear and equal bilaterally    Non-labored, no retractions or accessory muscle use  GI:  Abdomen is soft, non-tender, non-distended    No rebound tenderness or peritoneal features  MS:  Moves all extremities  Skin:  Warm and dry  Neuro:  Speech is normal and fluent. No apparent deficit.      Diagnostics     Lab Results   Labs Ordered and Resulted from Time of ED Arrival to Time of ED Departure   COMPREHENSIVE METABOLIC PANEL - Abnormal       Result Value    Sodium 136      Potassium 4.2      Carbon Dioxide (CO2) 23      Anion Gap 13      Urea Nitrogen 16.0      Creatinine 1.04      GFR Estimate 71      Calcium 9.2      Chloride 100      Glucose 208 (*)     Alkaline Phosphatase 81      AST 14      ALT 16      Protein Total 6.7      Albumin 4.1      Bilirubin Total 0.9     ROUTINE UA WITH MICROSCOPIC REFLEX TO CULTURE - Abnormal    Color Urine Light Brown (*)     Appearance Urine Cloudy (*)     Glucose Urine Negative      Bilirubin Urine Negative      Ketones Urine Negative      Specific Gravity Urine 1.013      Blood Urine Large (*)     pH Urine 6.0      Protein Albumin Urine 200 (*)     Urobilinogen Urine Normal      Nitrite Urine Negative      Leukocyte Esterase Urine Large (*)     WBC Clumps Urine Present (*)     RBC Urine 68 (*)     WBC Urine >182 (*)    LACTIC ACID WHOLE BLOOD WITH 1X REPEAT IN 2 HR WHEN >2 - Normal     Lactic Acid, Initial 1.2         Imaging   No orders to display     Independent Interpretation   NA    ED Course      Medications Administered   Medications   cefTRIAXone (ROCEPHIN) 2 g vial to attach to  ml bag for ADULTS or NS 50 ml bag for PEDS (0 g Intravenous Stopped 9/23/24 2235)   amoxicillin-clavulanate (AUGMENTIN) 875-125 MG per tablet 1 tablet (1 tablet Oral $Given 9/23/24 5493)       Procedures   Procedures     Discussion of Management   Pharmacist regarding oral treatment given history of enterococcus    ED Course   ED Course as of 09/23/24 2011   Mon Sep 23, 2024   1946 I obtained patient history and performed a physical exam.        Additional Documentation  None    Medical Decision Making / Diagnosis     RK   Mark Shultz is a 84 year old male with known urinary retention, persistent weakness/lack of coordination in his dominant arm he has follow up for, and concern for UTI.  Wants the agarwal back since he cannot reliably self cath and his goal is to return home after rehab.  Those are the two options for managing retention.  After discussing higher risk of recurrent infection with agarwal, he wanted to proceed with agarwal placement which was done.  Urine c/w UTI.  Regarding the tremors noted in the arm today, may have been rigors.  No new acute focal findings.  Seizure felt unlikely.  No neuroimaging obtained.  May have been a type of rigor but labs and vitals are not consistent with sepsis or severe sepsis.  IV + PO treatment for catheter associated UTI.    Disposition   The patient was discharged.     Diagnosis     ICD-10-CM    1. Urinary tract infection associated with catheterization of urinary tract, unspecified indwelling urinary catheter type, initial encounter  (H24)  T83.511A     N39.0       2. Urinary retention  R33.9            Discharge Medications   Discharge Medication List as of 9/23/2024 11:18 PM        START taking these medications    Details   amoxicillin-clavulanate  (AUGMENTIN) 875-125 MG tablet Take 1 tablet by mouth 2 times daily for 10 days., Disp-19 tablet, R-0, Local [id number] dose in ED               Scribe Disclosure:  I, Aline Elizabeth, am serving as a scribe at 7:53 PM on 9/23/2024 to document services personally performed by Jeny Duomnt MD based on my observations and the provider's statements to me.        Jeny Dumont MD  09/25/24 9163

## 2024-09-27 ENCOUNTER — TELEPHONE (OUTPATIENT)
Dept: NURSING | Facility: CLINIC | Age: 84
End: 2024-09-27
Payer: MEDICARE

## 2024-09-27 LAB
BACTERIA UR CULT: ABNORMAL
BACTERIA UR CULT: ABNORMAL

## 2024-09-27 NOTE — TELEPHONE ENCOUNTER
[facility]    Reason for call: Lab Result Notification     Lab Result (including Rx patient on, if applicable).  If culture, copy of lab report at bottom.  Lab Result: Final Urine Culture Report on 9/27/24  [facility] Emergency Dept discharge antibiotic prescribed: Amoxicillin-Clavulanate (Augmentin) 875-125 mg PO tablet, 1 tablet by mouth 2 times daily for 10 days   Bacterial Growth: 50,000 - 100,000 CFU/ML Pseudomonas aeruginosa AND 50,000 - 100,000 CFU/ML Pseudomonas aeruginosa   Recommendations in treatment per [facility] ED lab result Urine Culture protocol.       Patient's current Symptoms:   Mark is resident of the [facility]  RN contact nurse, April.   Will fax final urine culture to result to her at [phone]  They have a Provider who can review result and treat    RN Recommendations/Instructions per [facility] ED lab result protocol:   [facility] ED lab result protocol utilized: urine culture        Reece Gamez RN

## 2024-09-27 NOTE — RESULT ENCOUNTER NOTE
[Final] result faxed to the [facility] Facility: [facility]  Name of Nurse: April  Fax Number: [phone]  The facility nurse WILL consult with the facility provider or PCP to determine any change in treatment

## 2024-10-01 ENCOUNTER — LAB REQUISITION (OUTPATIENT)
Dept: LAB | Facility: CLINIC | Age: 84
End: 2024-10-01
Payer: MEDICARE

## 2024-10-01 DIAGNOSIS — N18.9 CHRONIC KIDNEY DISEASE, UNSPECIFIED: ICD-10-CM

## 2024-10-01 DIAGNOSIS — D72.829 ELEVATED WHITE BLOOD CELL COUNT, UNSPECIFIED: ICD-10-CM

## 2024-10-02 ENCOUNTER — TELEPHONE (OUTPATIENT)
Dept: ONCOLOGY | Facility: CLINIC | Age: 84
End: 2024-10-02
Payer: MEDICARE

## 2024-10-02 LAB
ANION GAP SERPL CALCULATED.3IONS-SCNC: 13 MMOL/L (ref 7–15)
BUN SERPL-MCNC: 12.4 MG/DL (ref 8–23)
CALCIUM SERPL-MCNC: 8.8 MG/DL (ref 8.8–10.4)
CHLORIDE SERPL-SCNC: 106 MMOL/L (ref 98–107)
CREAT SERPL-MCNC: 1.06 MG/DL (ref 0.67–1.17)
EGFRCR SERPLBLD CKD-EPI 2021: 69 ML/MIN/1.73M2
ERYTHROCYTE [DISTWIDTH] IN BLOOD BY AUTOMATED COUNT: 13.4 % (ref 10–15)
GLUCOSE SERPL-MCNC: 131 MG/DL (ref 70–99)
HCO3 SERPL-SCNC: 20 MMOL/L (ref 22–29)
HCT VFR BLD AUTO: 36.8 % (ref 40–53)
HGB BLD-MCNC: 12.8 G/DL (ref 13.3–17.7)
MCH RBC QN AUTO: 34 PG (ref 26.5–33)
MCHC RBC AUTO-ENTMCNC: 34.8 G/DL (ref 31.5–36.5)
MCV RBC AUTO: 98 FL (ref 78–100)
PLATELET # BLD AUTO: 191 10E3/UL (ref 150–450)
POTASSIUM SERPL-SCNC: 3.5 MMOL/L (ref 3.4–5.3)
RBC # BLD AUTO: 3.76 10E6/UL (ref 4.4–5.9)
SODIUM SERPL-SCNC: 139 MMOL/L (ref 135–145)
WBC # BLD AUTO: 15.1 10E3/UL (ref 4–11)

## 2024-10-02 PROCEDURE — P9604 ONE-WAY ALLOW PRORATED TRIP: HCPCS | Performed by: FAMILY MEDICINE

## 2024-10-02 PROCEDURE — 82310 ASSAY OF CALCIUM: CPT | Performed by: FAMILY MEDICINE

## 2024-10-02 PROCEDURE — 80048 BASIC METABOLIC PNL TOTAL CA: CPT | Performed by: FAMILY MEDICINE

## 2024-10-02 PROCEDURE — 85014 HEMATOCRIT: CPT | Performed by: FAMILY MEDICINE

## 2024-10-02 PROCEDURE — 36415 COLL VENOUS BLD VENIPUNCTURE: CPT | Performed by: FAMILY MEDICINE

## 2024-10-02 NOTE — TELEPHONE ENCOUNTER
Carlos at [facility] is calling.   States that pt will be discharged from their facility on 10/5/2024. He is scheduled for BCG with first appt on 10/15/2024. Dr Daniels is ordering provider for BCG.   Carlos is calling to update the infusion team that pt will be discharged with indwelling Pemberton catheter. Catheter will need to be changed out at these appts.    Writer talked with Reyes in infusion.  Per Reyes, pt should update Dr Daniels to make sure that Dr Daniels is aware that pt has indwelling Pemberton catheter and can provide further orders if needed.  Reyes also suggested calling back tomorrow to update charge nurse to see if any further recommendations.    Will plan to check with charge nurse tomorrow.  Dena Sahu RN on 10/2/2024 at 4:41 PM

## 2024-10-03 ENCOUNTER — TELEPHONE (OUTPATIENT)
Dept: UROLOGY | Facility: CLINIC | Age: 84
End: 2024-10-03
Payer: MEDICARE

## 2024-10-03 NOTE — TELEPHONE ENCOUNTER
Writer talked with Jodi, charge nurse, today.  Jodi states that they will need updated order from Dr Daniels regarding the catheter and changing the catheter.  Attempted to reach Carlos at TCU, but she did not answer.  Left detailed message for Carlos to let her know that Dr Daniels needs to be contacted by pt or TCU to make sure he is aware that pt has indwelling Pemberton catheter and to provide updated orders.  Please contact the clinic with any further questions.  Dena Sahu RN on 10/3/2024 at 12:33 PM

## 2024-10-03 NOTE — TELEPHONE ENCOUNTER
Health Call Center    Phone Message    May a detailed message be left on voicemail: yes     Reason for Call: Other: Carlos said after pt had his cath changed, he end up in ER. Carlos wants for Dr. Daniels to call the [place] location for the Nov 15 th appointment . They would explain to Dr. Daniels after he calls them. Please call Amora for more information. Thanks.     Action Taken: Other:   [facility]         Travel Screening: Not Applicable     Date of Service:

## 2024-10-03 NOTE — TELEPHONE ENCOUNTER
Returned phone call to [facility] and ELIZABETH.    Shania Gilman LPN

## 2024-10-14 ENCOUNTER — TELEPHONE (OUTPATIENT)
Dept: UROLOGY | Facility: CLINIC | Age: 84
End: 2024-10-14
Payer: MEDICARE

## 2024-10-14 NOTE — TELEPHONE ENCOUNTER
Patient has a agarwal in per his request. He is starting BCG tomorrow. Is this still okay?   The infusion center at [facility] wants to make sure.  Norma Hathaway LPN

## 2024-10-15 ENCOUNTER — INFUSION THERAPY VISIT (OUTPATIENT)
Dept: INFUSION THERAPY | Facility: CLINIC | Age: 84
End: 2024-10-15
Attending: UROLOGY
Payer: MEDICARE

## 2024-10-15 VITALS
OXYGEN SATURATION: 97 % | TEMPERATURE: 98.3 F | HEART RATE: 66 BPM | RESPIRATION RATE: 16 BRPM | DIASTOLIC BLOOD PRESSURE: 74 MMHG | SYSTOLIC BLOOD PRESSURE: 126 MMHG

## 2024-10-15 DIAGNOSIS — C67.8 MALIGNANT NEOPLASM OF OVERLAPPING SITES OF BLADDER (H): Primary | ICD-10-CM

## 2024-10-15 LAB
ALBUMIN UR-MCNC: 50 MG/DL
APPEARANCE UR: ABNORMAL
BILIRUB UR QL STRIP: NEGATIVE
COLOR UR AUTO: YELLOW
GLUCOSE UR STRIP-MCNC: 50 MG/DL
HGB UR QL STRIP: ABNORMAL
KETONES UR STRIP-MCNC: NEGATIVE MG/DL
LEUKOCYTE ESTERASE UR QL STRIP: ABNORMAL
NITRATE UR QL: NEGATIVE
PH UR STRIP: 5.5 [PH] (ref 5–7)
SP GR UR STRIP: 1.02 (ref 1–1.03)
UROBILINOGEN UR STRIP-MCNC: NORMAL MG/DL

## 2024-10-15 PROCEDURE — 51720 TREATMENT OF BLADDER LESION: CPT

## 2024-10-15 PROCEDURE — 250N000011 HC RX IP 250 OP 636: Performed by: UROLOGY

## 2024-10-15 PROCEDURE — 81003 URINALYSIS AUTO W/O SCOPE: CPT | Performed by: UROLOGY

## 2024-10-15 RX ORDER — LIDOCAINE HYDROCHLORIDE 20 MG/ML
10 JELLY TOPICAL
Status: DISCONTINUED | OUTPATIENT
Start: 2024-10-15 | End: 2024-10-15 | Stop reason: HOSPADM

## 2024-10-15 RX ADMIN — BACILLUS CALMETTE-GUERIN 50 MG: 50 POWDER, FOR SUSPENSION INTRAVESICAL at 13:45

## 2024-10-15 NOTE — PATIENT INSTRUCTIONS
Your have already completed the dwell time in the clinic.    Take your antibiotic today at 7:45 PM and tomorrow morning.      Please call your urology team with fevers or chills, burning with urination, or blood in your urine which lasts more than a 48-72 hours or with any question or concerns.

## 2024-10-15 NOTE — PROGRESS NOTES
"Infusion Nursing Note:  Mark Shultz presents today for Lab + BCG #1 of 3.    Patient seen by provider today: No   present during visit today: Not Applicable.    Note: Mark reports he is feeling well today.  He denies any visible blood in urine or fever/chills.  He denies any issues after his previous BCG treatments.    He has a chronic indwelling agarwal catheter, which he requests to be changed today due to leakage at home.  (Per 10/14 urology note, change catheter monthly.)  Agarwal catheter changed prior to obtaining urine specimen.  Changed to leg bag after draining BCG, just prior to discharge.  Educated patient on importance of daily urinary catheter care.    Called [facility] to confirm they will fill his Bactrim prescription.  Mark confirms that he will pick it up on his way home and take it as prescribed.    Treatment Conditions:  Lab Test 10/15/24  1313   COLOR Yellow   APPEARANCE Cloudy*   URINEGLC 50*   URINEBILI Negative   URINEKETONE Negative   SG 1.023   UBLD Moderate*   URINEPH 5.5   PROTEIN 50*   UROBILINOGEN  --    NITRITE Negative   LEUKEST Large*   RBCU  --    WBCU  --      Results reviewed, labs MET treatment parameters, ok to proceed with treatment.    Pre-procedure Assessment:  Dysuria: No  Hematuria: No  Fever/chills: No  Increased urinary urgency: No  Increased urinary frequency: No    Procedure and Post-procedure assessment\"  16F Coude catheter placed.  Lidocaine urojet 2% 10ml used.  Catheter placed without trauma.  Clear/yellow urine drained from bladder.    Patient turned every 15 minutes per protocol: Yes, turning completed at clinic per protocol.  Patient tolerated instillation without incident..  Patient dwelled for 120 minutes.    Patient instructed on the following and verbalized understanding:   Medication to remain in the bladder for 2 hours post instillation: N/A, urine drained in clinic.  Post instillation side effects and precautions discussed: YES    Discharge " Plan:   Discharge instructions reviewed with: Patient and Family.  Patient and/or family verbalized understanding of discharge instructions and all questions answered.  Copy of AVS reviewed with patient and/or family.  Patient will return 10/22 for next appointment.  Patient discharged in stable condition accompanied by: wife.  Departure Mode: Ambulatory with walker.      Reyes Madison RN

## 2024-10-22 ENCOUNTER — INFUSION THERAPY VISIT (OUTPATIENT)
Dept: INFUSION THERAPY | Facility: CLINIC | Age: 84
End: 2024-10-22
Attending: UROLOGY
Payer: MEDICARE

## 2024-10-22 VITALS
RESPIRATION RATE: 16 BRPM | SYSTOLIC BLOOD PRESSURE: 145 MMHG | OXYGEN SATURATION: 95 % | HEART RATE: 59 BPM | DIASTOLIC BLOOD PRESSURE: 66 MMHG | TEMPERATURE: 97.9 F

## 2024-10-22 DIAGNOSIS — C67.8 MALIGNANT NEOPLASM OF OVERLAPPING SITES OF BLADDER (H): Primary | ICD-10-CM

## 2024-10-22 LAB
ALBUMIN UR-MCNC: 10 MG/DL
APPEARANCE UR: CLEAR
BILIRUB UR QL STRIP: NEGATIVE
COLOR UR AUTO: ABNORMAL
GLUCOSE UR STRIP-MCNC: NEGATIVE MG/DL
HGB UR QL STRIP: ABNORMAL
KETONES UR STRIP-MCNC: NEGATIVE MG/DL
LEUKOCYTE ESTERASE UR QL STRIP: ABNORMAL
NITRATE UR QL: NEGATIVE
PH UR STRIP: 5 [PH] (ref 5–7)
SP GR UR STRIP: 1.01 (ref 1–1.03)
UROBILINOGEN UR STRIP-MCNC: NORMAL MG/DL

## 2024-10-22 PROCEDURE — 250N000011 HC RX IP 250 OP 636: Performed by: UROLOGY

## 2024-10-22 PROCEDURE — 51720 TREATMENT OF BLADDER LESION: CPT

## 2024-10-22 PROCEDURE — 81003 URINALYSIS AUTO W/O SCOPE: CPT | Performed by: UROLOGY

## 2024-10-22 RX ADMIN — BACILLUS CALMETTE-GUERIN 50 MG: 50 POWDER, FOR SUSPENSION INTRAVESICAL at 14:02

## 2024-10-22 NOTE — PROGRESS NOTES
"Infusion Nursing Note:    Mark Shultz presents today for D8C2 BCG + Lab.      Patient seen by provider today: No     present during visit today: Not Applicable.    Note: Patient has an indwelling catheter in place chronically.  Patient prefers to turn at home, as this is what he is use to doing.  Spoke with Charge, EVELINA Zapata, and patient is OK to turn at home.    Patient had catheter changed last week, 10/15/2024.  In order to place the BCG into the bladder, leg bag was changed to a regular bag, then changed back to the leg bag.    Education completed on unclamping the catheter and draining the BCG after the 2hrs of instillation.  After draining the BCG, patient instructed to change the leg bag to a new leg bag.  Patient states he has extra leg bags at home and is OK changing out the leg bag after the draining of the BCG.      Treatment Conditions:  Results reviewed, labs MET treatment parameters, ok to proceed with treatment.  UA RESULTS:  Recent Labs   Lab Test 10/22/24  1312   COLOR Light Yellow   APPEARANCE Clear   URINEGLC Negative   URINEBILI Negative   URINEKETONE Negative   SG 1.013   UBLD Moderate*   URINEPH 5.0   PROTEIN 10*   UROBILINOGEN  --    NITRITE Negative   LEUKEST Moderate*   RBCU  --    WBCU  --     < > = values in this interval not displayed.       Pre-procedure Assessment:  Indwelling catheter in place:  Dysuria:  No  Hematuria:  No  Fever/chills:  No  Increased urinary urgency:  No  Increased urinary frequency:  No    Procedure and Post-procedure assessment\"  Patient has an indwelling catheter in place chronically and uses a leg bag.  Patient turned every 15 minutes per protocol: Yes, turning to be completed at home per protocol.   Patient tolerated instillation without incident.      Patient instructed on the following and verbalized understanding:   Medication to remain in the bladder for 2 hours post instillation: YES  Post instillation side effects and precautions discussed: " YES    Discharge Plan:   Discharge instructions reviewed with: Patient.  Patient and/or family verbalized understanding of discharge instructions and all questions answered.  AVS to patient via [employer].  Patient will return 10/29 for next appointment.   Patient discharged in stable condition accompanied by: self.  Departure Mode: Ambulatory.    Anna Chavez, RN, RN

## 2024-10-24 ENCOUNTER — TELEPHONE (OUTPATIENT)
Dept: UROLOGY | Facility: CLINIC | Age: 84
End: 2024-10-24
Payer: MEDICARE

## 2024-10-24 NOTE — TELEPHONE ENCOUNTER
[facility] Call Center    Phone Message    May a detailed message be left on voicemail: yes     Reason for Call: Other: home care calling and needing orders regarding pt cath, please reach out to them     Action Taken: Other: urology    Travel Screening: Not Applicable     Date of Service:

## 2024-10-25 RX ORDER — METHYLPREDNISOLONE SODIUM SUCCINATE 40 MG/ML
40 INJECTION INTRAMUSCULAR; INTRAVENOUS
Start: 2025-10-18

## 2024-10-25 RX ORDER — ALBUTEROL SULFATE 0.83 MG/ML
2.5 SOLUTION RESPIRATORY (INHALATION)
OUTPATIENT
Start: 2025-05-05

## 2024-10-25 RX ORDER — ALBUTEROL SULFATE 90 UG/1
1-2 INHALANT RESPIRATORY (INHALATION)
Status: CANCELLED
Start: 2024-11-06

## 2024-10-25 RX ORDER — EPINEPHRINE 1 MG/ML
0.3 INJECTION, SOLUTION INTRAMUSCULAR; SUBCUTANEOUS EVERY 5 MIN PRN
OUTPATIENT
Start: 2027-04-11

## 2024-10-25 RX ORDER — DIPHENHYDRAMINE HYDROCHLORIDE 50 MG/ML
25 INJECTION INTRAMUSCULAR; INTRAVENOUS
Start: 2025-05-05

## 2024-10-25 RX ORDER — DIPHENHYDRAMINE HYDROCHLORIDE 50 MG/ML
50 INJECTION INTRAMUSCULAR; INTRAVENOUS
Start: 2026-04-16

## 2024-10-25 RX ORDER — METHYLPREDNISOLONE SODIUM SUCCINATE 40 MG/ML
40 INJECTION INTRAMUSCULAR; INTRAVENOUS
Start: 2027-04-25

## 2024-10-25 RX ORDER — ALBUTEROL SULFATE 90 UG/1
1-2 INHALANT RESPIRATORY (INHALATION)
Start: 2025-10-25

## 2024-10-25 RX ORDER — EPINEPHRINE 1 MG/ML
0.3 INJECTION, SOLUTION INTRAMUSCULAR; SUBCUTANEOUS EVERY 5 MIN PRN
OUTPATIENT
Start: 2026-04-23

## 2024-10-25 RX ORDER — DIPHENHYDRAMINE HYDROCHLORIDE 50 MG/ML
25 INJECTION INTRAMUSCULAR; INTRAVENOUS
Start: 2026-10-27

## 2024-10-25 RX ORDER — DIPHENHYDRAMINE HYDROCHLORIDE 50 MG/ML
50 INJECTION INTRAMUSCULAR; INTRAVENOUS
Start: 2027-04-11

## 2024-10-25 RX ORDER — METHYLPREDNISOLONE SODIUM SUCCINATE 40 MG/ML
40 INJECTION INTRAMUSCULAR; INTRAVENOUS
Start: 2027-04-18

## 2024-10-25 RX ORDER — ALBUTEROL SULFATE 0.83 MG/ML
2.5 SOLUTION RESPIRATORY (INHALATION)
OUTPATIENT
Start: 2025-10-18

## 2024-10-25 RX ORDER — ALBUTEROL SULFATE 90 UG/1
1-2 INHALANT RESPIRATORY (INHALATION)
Start: 2026-04-23

## 2024-10-25 RX ORDER — MEPERIDINE HYDROCHLORIDE 25 MG/ML
25 INJECTION INTRAMUSCULAR; INTRAVENOUS; SUBCUTANEOUS
OUTPATIENT
Start: 2027-04-18

## 2024-10-25 RX ORDER — DIPHENHYDRAMINE HYDROCHLORIDE 50 MG/ML
25 INJECTION INTRAMUSCULAR; INTRAVENOUS
Start: 2026-04-16

## 2024-10-25 RX ORDER — DIPHENHYDRAMINE HYDROCHLORIDE 50 MG/ML
25 INJECTION INTRAMUSCULAR; INTRAVENOUS
Start: 2025-04-21

## 2024-10-25 RX ORDER — METHYLPREDNISOLONE SODIUM SUCCINATE 40 MG/ML
40 INJECTION INTRAMUSCULAR; INTRAVENOUS
Start: 2026-04-23

## 2024-10-25 RX ORDER — ALBUTEROL SULFATE 0.83 MG/ML
2.5 SOLUTION RESPIRATORY (INHALATION)
OUTPATIENT
Start: 2027-04-18

## 2024-10-25 RX ORDER — ALBUTEROL SULFATE 90 UG/1
1-2 INHALANT RESPIRATORY (INHALATION)
Start: 2026-10-20

## 2024-10-25 RX ORDER — EPINEPHRINE 1 MG/ML
0.3 INJECTION, SOLUTION INTRAMUSCULAR; SUBCUTANEOUS EVERY 5 MIN PRN
Status: CANCELLED | OUTPATIENT
Start: 2024-11-06

## 2024-10-25 RX ORDER — ALBUTEROL SULFATE 90 UG/1
1-2 INHALANT RESPIRATORY (INHALATION)
Start: 2027-04-18

## 2024-10-25 RX ORDER — ALBUTEROL SULFATE 90 UG/1
1-2 INHALANT RESPIRATORY (INHALATION)
Start: 2026-04-16

## 2024-10-25 RX ORDER — DIPHENHYDRAMINE HYDROCHLORIDE 50 MG/ML
25 INJECTION INTRAMUSCULAR; INTRAVENOUS
Start: 2025-10-25

## 2024-10-25 RX ORDER — EPINEPHRINE 1 MG/ML
0.3 INJECTION, SOLUTION INTRAMUSCULAR; SUBCUTANEOUS EVERY 5 MIN PRN
OUTPATIENT
Start: 2025-04-28

## 2024-10-25 RX ORDER — DIPHENHYDRAMINE HYDROCHLORIDE 50 MG/ML
25 INJECTION INTRAMUSCULAR; INTRAVENOUS
Start: 2026-04-30

## 2024-10-25 RX ORDER — DIPHENHYDRAMINE HYDROCHLORIDE 50 MG/ML
50 INJECTION INTRAMUSCULAR; INTRAVENOUS
Start: 2027-04-25

## 2024-10-25 RX ORDER — DIPHENHYDRAMINE HYDROCHLORIDE 50 MG/ML
50 INJECTION INTRAMUSCULAR; INTRAVENOUS
Start: 2026-10-13

## 2024-10-25 RX ORDER — EPINEPHRINE 1 MG/ML
0.3 INJECTION, SOLUTION INTRAMUSCULAR; SUBCUTANEOUS EVERY 5 MIN PRN
OUTPATIENT
Start: 2026-10-27

## 2024-10-25 RX ORDER — MEPERIDINE HYDROCHLORIDE 25 MG/ML
25 INJECTION INTRAMUSCULAR; INTRAVENOUS; SUBCUTANEOUS
OUTPATIENT
Start: 2026-04-30

## 2024-10-25 RX ORDER — ALBUTEROL SULFATE 0.83 MG/ML
2.5 SOLUTION RESPIRATORY (INHALATION)
OUTPATIENT
Start: 2027-04-11

## 2024-10-25 RX ORDER — MEPERIDINE HYDROCHLORIDE 25 MG/ML
25 INJECTION INTRAMUSCULAR; INTRAVENOUS; SUBCUTANEOUS
OUTPATIENT
Start: 2025-10-25

## 2024-10-25 RX ORDER — MEPERIDINE HYDROCHLORIDE 25 MG/ML
25 INJECTION INTRAMUSCULAR; INTRAVENOUS; SUBCUTANEOUS
OUTPATIENT
Start: 2026-10-13

## 2024-10-25 RX ORDER — DIPHENHYDRAMINE HYDROCHLORIDE 50 MG/ML
25 INJECTION INTRAMUSCULAR; INTRAVENOUS
Start: 2026-04-23

## 2024-10-25 RX ORDER — DIPHENHYDRAMINE HYDROCHLORIDE 50 MG/ML
25 INJECTION INTRAMUSCULAR; INTRAVENOUS
Start: 2027-04-11

## 2024-10-25 RX ORDER — EPINEPHRINE 1 MG/ML
0.3 INJECTION, SOLUTION INTRAMUSCULAR; SUBCUTANEOUS EVERY 5 MIN PRN
OUTPATIENT
Start: 2025-10-25

## 2024-10-25 RX ORDER — ALBUTEROL SULFATE 90 UG/1
1-2 INHALANT RESPIRATORY (INHALATION)
Start: 2025-10-18

## 2024-10-25 RX ORDER — ALBUTEROL SULFATE 0.83 MG/ML
2.5 SOLUTION RESPIRATORY (INHALATION)
OUTPATIENT
Start: 2026-10-13

## 2024-10-25 RX ORDER — ALBUTEROL SULFATE 90 UG/1
1-2 INHALANT RESPIRATORY (INHALATION)
Start: 2025-04-21

## 2024-10-25 RX ORDER — METHYLPREDNISOLONE SODIUM SUCCINATE 40 MG/ML
40 INJECTION INTRAMUSCULAR; INTRAVENOUS
Start: 2025-11-01

## 2024-10-25 RX ORDER — DIPHENHYDRAMINE HYDROCHLORIDE 50 MG/ML
25 INJECTION INTRAMUSCULAR; INTRAVENOUS
Start: 2025-10-18

## 2024-10-25 RX ORDER — DIPHENHYDRAMINE HYDROCHLORIDE 50 MG/ML
50 INJECTION INTRAMUSCULAR; INTRAVENOUS
Start: 2025-04-21

## 2024-10-25 RX ORDER — DIPHENHYDRAMINE HYDROCHLORIDE 50 MG/ML
25 INJECTION INTRAMUSCULAR; INTRAVENOUS
Status: CANCELLED
Start: 2024-11-06

## 2024-10-25 RX ORDER — ALBUTEROL SULFATE 0.83 MG/ML
2.5 SOLUTION RESPIRATORY (INHALATION)
OUTPATIENT
Start: 2026-10-27

## 2024-10-25 RX ORDER — MEPERIDINE HYDROCHLORIDE 25 MG/ML
25 INJECTION INTRAMUSCULAR; INTRAVENOUS; SUBCUTANEOUS
OUTPATIENT
Start: 2025-04-28

## 2024-10-25 RX ORDER — EPINEPHRINE 1 MG/ML
0.3 INJECTION, SOLUTION INTRAMUSCULAR; SUBCUTANEOUS EVERY 5 MIN PRN
OUTPATIENT
Start: 2027-04-25

## 2024-10-25 RX ORDER — MEPERIDINE HYDROCHLORIDE 25 MG/ML
25 INJECTION INTRAMUSCULAR; INTRAVENOUS; SUBCUTANEOUS
OUTPATIENT
Start: 2026-04-23

## 2024-10-25 RX ORDER — EPINEPHRINE 1 MG/ML
0.3 INJECTION, SOLUTION INTRAMUSCULAR; SUBCUTANEOUS EVERY 5 MIN PRN
OUTPATIENT
Start: 2026-04-30

## 2024-10-25 RX ORDER — ALBUTEROL SULFATE 90 UG/1
1-2 INHALANT RESPIRATORY (INHALATION)
Start: 2027-04-25

## 2024-10-25 RX ORDER — DIPHENHYDRAMINE HYDROCHLORIDE 50 MG/ML
50 INJECTION INTRAMUSCULAR; INTRAVENOUS
Start: 2025-05-05

## 2024-10-25 RX ORDER — METHYLPREDNISOLONE SODIUM SUCCINATE 40 MG/ML
40 INJECTION INTRAMUSCULAR; INTRAVENOUS
Start: 2025-05-05

## 2024-10-25 RX ORDER — METHYLPREDNISOLONE SODIUM SUCCINATE 40 MG/ML
40 INJECTION INTRAMUSCULAR; INTRAVENOUS
Status: CANCELLED
Start: 2024-11-06

## 2024-10-25 RX ORDER — ALBUTEROL SULFATE 0.83 MG/ML
2.5 SOLUTION RESPIRATORY (INHALATION)
OUTPATIENT
Start: 2025-10-25

## 2024-10-25 RX ORDER — EPINEPHRINE 1 MG/ML
0.3 INJECTION, SOLUTION INTRAMUSCULAR; SUBCUTANEOUS EVERY 5 MIN PRN
OUTPATIENT
Start: 2026-10-20

## 2024-10-25 RX ORDER — ALBUTEROL SULFATE 90 UG/1
1-2 INHALANT RESPIRATORY (INHALATION)
Start: 2025-05-05

## 2024-10-25 RX ORDER — METHYLPREDNISOLONE SODIUM SUCCINATE 40 MG/ML
40 INJECTION INTRAMUSCULAR; INTRAVENOUS
Start: 2025-10-25

## 2024-10-25 RX ORDER — ALBUTEROL SULFATE 90 UG/1
1-2 INHALANT RESPIRATORY (INHALATION)
Start: 2027-04-11

## 2024-10-25 RX ORDER — MEPERIDINE HYDROCHLORIDE 25 MG/ML
25 INJECTION INTRAMUSCULAR; INTRAVENOUS; SUBCUTANEOUS
OUTPATIENT
Start: 2026-10-27

## 2024-10-25 RX ORDER — ALBUTEROL SULFATE 0.83 MG/ML
2.5 SOLUTION RESPIRATORY (INHALATION)
OUTPATIENT
Start: 2026-04-16

## 2024-10-25 RX ORDER — DIPHENHYDRAMINE HYDROCHLORIDE 50 MG/ML
25 INJECTION INTRAMUSCULAR; INTRAVENOUS
Start: 2025-04-28

## 2024-10-25 RX ORDER — ALBUTEROL SULFATE 0.83 MG/ML
2.5 SOLUTION RESPIRATORY (INHALATION)
OUTPATIENT
Start: 2027-04-25

## 2024-10-25 RX ORDER — DIPHENHYDRAMINE HYDROCHLORIDE 50 MG/ML
25 INJECTION INTRAMUSCULAR; INTRAVENOUS
Start: 2027-04-25

## 2024-10-25 RX ORDER — EPINEPHRINE 1 MG/ML
0.3 INJECTION, SOLUTION INTRAMUSCULAR; SUBCUTANEOUS EVERY 5 MIN PRN
OUTPATIENT
Start: 2025-05-05

## 2024-10-25 RX ORDER — ALBUTEROL SULFATE 0.83 MG/ML
2.5 SOLUTION RESPIRATORY (INHALATION)
OUTPATIENT
Start: 2026-04-30

## 2024-10-25 RX ORDER — ALBUTEROL SULFATE 0.83 MG/ML
2.5 SOLUTION RESPIRATORY (INHALATION)
OUTPATIENT
Start: 2026-10-20

## 2024-10-25 RX ORDER — DIPHENHYDRAMINE HYDROCHLORIDE 50 MG/ML
25 INJECTION INTRAMUSCULAR; INTRAVENOUS
Start: 2027-04-18

## 2024-10-25 RX ORDER — MEPERIDINE HYDROCHLORIDE 25 MG/ML
25 INJECTION INTRAMUSCULAR; INTRAVENOUS; SUBCUTANEOUS
OUTPATIENT
Start: 2026-10-20

## 2024-10-25 RX ORDER — ALBUTEROL SULFATE 90 UG/1
1-2 INHALANT RESPIRATORY (INHALATION)
Start: 2026-04-30

## 2024-10-25 RX ORDER — METHYLPREDNISOLONE SODIUM SUCCINATE 40 MG/ML
40 INJECTION INTRAMUSCULAR; INTRAVENOUS
Start: 2025-04-21

## 2024-10-25 RX ORDER — DIPHENHYDRAMINE HYDROCHLORIDE 50 MG/ML
25 INJECTION INTRAMUSCULAR; INTRAVENOUS
Start: 2026-10-13

## 2024-10-25 RX ORDER — DIPHENHYDRAMINE HYDROCHLORIDE 50 MG/ML
25 INJECTION INTRAMUSCULAR; INTRAVENOUS
Start: 2026-10-20

## 2024-10-25 RX ORDER — MEPERIDINE HYDROCHLORIDE 25 MG/ML
25 INJECTION INTRAMUSCULAR; INTRAVENOUS; SUBCUTANEOUS
OUTPATIENT
Start: 2027-04-25

## 2024-10-25 RX ORDER — MEPERIDINE HYDROCHLORIDE 25 MG/ML
25 INJECTION INTRAMUSCULAR; INTRAVENOUS; SUBCUTANEOUS
OUTPATIENT
Start: 2025-04-21

## 2024-10-25 RX ORDER — DIPHENHYDRAMINE HYDROCHLORIDE 50 MG/ML
50 INJECTION INTRAMUSCULAR; INTRAVENOUS
Start: 2026-10-27

## 2024-10-25 RX ORDER — ALBUTEROL SULFATE 0.83 MG/ML
2.5 SOLUTION RESPIRATORY (INHALATION)
Status: CANCELLED | OUTPATIENT
Start: 2024-11-06

## 2024-10-25 RX ORDER — ALBUTEROL SULFATE 0.83 MG/ML
2.5 SOLUTION RESPIRATORY (INHALATION)
OUTPATIENT
Start: 2026-04-23

## 2024-10-25 RX ORDER — EPINEPHRINE 1 MG/ML
0.3 INJECTION, SOLUTION INTRAMUSCULAR; SUBCUTANEOUS EVERY 5 MIN PRN
OUTPATIENT
Start: 2025-04-21

## 2024-10-25 RX ORDER — DIPHENHYDRAMINE HYDROCHLORIDE 50 MG/ML
50 INJECTION INTRAMUSCULAR; INTRAVENOUS
Start: 2025-10-18

## 2024-10-25 RX ORDER — METHYLPREDNISOLONE SODIUM SUCCINATE 40 MG/ML
40 INJECTION INTRAMUSCULAR; INTRAVENOUS
Start: 2026-10-20

## 2024-10-25 RX ORDER — EPINEPHRINE 1 MG/ML
0.3 INJECTION, SOLUTION INTRAMUSCULAR; SUBCUTANEOUS EVERY 5 MIN PRN
OUTPATIENT
Start: 2026-04-16

## 2024-10-25 RX ORDER — EPINEPHRINE 1 MG/ML
0.3 INJECTION, SOLUTION INTRAMUSCULAR; SUBCUTANEOUS EVERY 5 MIN PRN
OUTPATIENT
Start: 2025-11-01

## 2024-10-25 RX ORDER — METHYLPREDNISOLONE SODIUM SUCCINATE 40 MG/ML
40 INJECTION INTRAMUSCULAR; INTRAVENOUS
Start: 2026-10-27

## 2024-10-25 RX ORDER — EPINEPHRINE 1 MG/ML
0.3 INJECTION, SOLUTION INTRAMUSCULAR; SUBCUTANEOUS EVERY 5 MIN PRN
OUTPATIENT
Start: 2025-10-18

## 2024-10-25 RX ORDER — METHYLPREDNISOLONE SODIUM SUCCINATE 40 MG/ML
40 INJECTION INTRAMUSCULAR; INTRAVENOUS
Start: 2027-04-11

## 2024-10-25 RX ORDER — DIPHENHYDRAMINE HYDROCHLORIDE 50 MG/ML
50 INJECTION INTRAMUSCULAR; INTRAVENOUS
Start: 2026-04-23

## 2024-10-25 RX ORDER — MEPERIDINE HYDROCHLORIDE 25 MG/ML
25 INJECTION INTRAMUSCULAR; INTRAVENOUS; SUBCUTANEOUS
Status: CANCELLED | OUTPATIENT
Start: 2024-11-06

## 2024-10-25 RX ORDER — ALBUTEROL SULFATE 0.83 MG/ML
2.5 SOLUTION RESPIRATORY (INHALATION)
OUTPATIENT
Start: 2025-11-01

## 2024-10-25 RX ORDER — DIPHENHYDRAMINE HYDROCHLORIDE 50 MG/ML
50 INJECTION INTRAMUSCULAR; INTRAVENOUS
Start: 2026-10-20

## 2024-10-25 RX ORDER — MEPERIDINE HYDROCHLORIDE 25 MG/ML
25 INJECTION INTRAMUSCULAR; INTRAVENOUS; SUBCUTANEOUS
OUTPATIENT
Start: 2027-04-11

## 2024-10-25 RX ORDER — DIPHENHYDRAMINE HYDROCHLORIDE 50 MG/ML
50 INJECTION INTRAMUSCULAR; INTRAVENOUS
Status: CANCELLED
Start: 2024-11-06

## 2024-10-25 RX ORDER — DIPHENHYDRAMINE HYDROCHLORIDE 50 MG/ML
50 INJECTION INTRAMUSCULAR; INTRAVENOUS
Start: 2026-04-30

## 2024-10-25 RX ORDER — ALBUTEROL SULFATE 90 UG/1
1-2 INHALANT RESPIRATORY (INHALATION)
Start: 2026-10-13

## 2024-10-25 RX ORDER — MEPERIDINE HYDROCHLORIDE 25 MG/ML
25 INJECTION INTRAMUSCULAR; INTRAVENOUS; SUBCUTANEOUS
OUTPATIENT
Start: 2025-11-01

## 2024-10-25 RX ORDER — ALBUTEROL SULFATE 90 UG/1
1-2 INHALANT RESPIRATORY (INHALATION)
Start: 2026-10-27

## 2024-10-25 RX ORDER — DIPHENHYDRAMINE HYDROCHLORIDE 50 MG/ML
50 INJECTION INTRAMUSCULAR; INTRAVENOUS
Start: 2025-04-28

## 2024-10-25 RX ORDER — MEPERIDINE HYDROCHLORIDE 25 MG/ML
25 INJECTION INTRAMUSCULAR; INTRAVENOUS; SUBCUTANEOUS
OUTPATIENT
Start: 2025-05-05

## 2024-10-25 RX ORDER — DIPHENHYDRAMINE HYDROCHLORIDE 50 MG/ML
25 INJECTION INTRAMUSCULAR; INTRAVENOUS
Start: 2025-11-01

## 2024-10-25 RX ORDER — METHYLPREDNISOLONE SODIUM SUCCINATE 40 MG/ML
40 INJECTION INTRAMUSCULAR; INTRAVENOUS
Start: 2025-04-28

## 2024-10-25 RX ORDER — DIPHENHYDRAMINE HYDROCHLORIDE 50 MG/ML
50 INJECTION INTRAMUSCULAR; INTRAVENOUS
Start: 2025-11-01

## 2024-10-25 RX ORDER — METHYLPREDNISOLONE SODIUM SUCCINATE 40 MG/ML
40 INJECTION INTRAMUSCULAR; INTRAVENOUS
Start: 2026-10-13

## 2024-10-25 RX ORDER — METHYLPREDNISOLONE SODIUM SUCCINATE 40 MG/ML
40 INJECTION INTRAMUSCULAR; INTRAVENOUS
Start: 2026-04-16

## 2024-10-25 RX ORDER — METHYLPREDNISOLONE SODIUM SUCCINATE 40 MG/ML
40 INJECTION INTRAMUSCULAR; INTRAVENOUS
Start: 2026-04-30

## 2024-10-25 RX ORDER — MEPERIDINE HYDROCHLORIDE 25 MG/ML
25 INJECTION INTRAMUSCULAR; INTRAVENOUS; SUBCUTANEOUS
OUTPATIENT
Start: 2026-04-16

## 2024-10-25 RX ORDER — ALBUTEROL SULFATE 0.83 MG/ML
2.5 SOLUTION RESPIRATORY (INHALATION)
OUTPATIENT
Start: 2025-04-28

## 2024-10-25 RX ORDER — EPINEPHRINE 1 MG/ML
0.3 INJECTION, SOLUTION INTRAMUSCULAR; SUBCUTANEOUS EVERY 5 MIN PRN
OUTPATIENT
Start: 2026-10-13

## 2024-10-25 RX ORDER — ALBUTEROL SULFATE 0.83 MG/ML
2.5 SOLUTION RESPIRATORY (INHALATION)
OUTPATIENT
Start: 2025-04-21

## 2024-10-25 RX ORDER — DIPHENHYDRAMINE HYDROCHLORIDE 50 MG/ML
50 INJECTION INTRAMUSCULAR; INTRAVENOUS
Start: 2027-04-18

## 2024-10-25 RX ORDER — ALBUTEROL SULFATE 90 UG/1
1-2 INHALANT RESPIRATORY (INHALATION)
Start: 2025-11-01

## 2024-10-25 RX ORDER — MEPERIDINE HYDROCHLORIDE 25 MG/ML
25 INJECTION INTRAMUSCULAR; INTRAVENOUS; SUBCUTANEOUS
OUTPATIENT
Start: 2025-10-18

## 2024-10-25 RX ORDER — EPINEPHRINE 1 MG/ML
0.3 INJECTION, SOLUTION INTRAMUSCULAR; SUBCUTANEOUS EVERY 5 MIN PRN
OUTPATIENT
Start: 2027-04-18

## 2024-10-25 RX ORDER — DIPHENHYDRAMINE HYDROCHLORIDE 50 MG/ML
50 INJECTION INTRAMUSCULAR; INTRAVENOUS
Start: 2025-10-25

## 2024-10-25 RX ORDER — ALBUTEROL SULFATE 90 UG/1
1-2 INHALANT RESPIRATORY (INHALATION)
Start: 2025-04-28

## 2024-10-29 ENCOUNTER — INFUSION THERAPY VISIT (OUTPATIENT)
Dept: INFUSION THERAPY | Facility: CLINIC | Age: 84
End: 2024-10-29
Attending: UROLOGY
Payer: MEDICARE

## 2024-10-29 VITALS
HEART RATE: 58 BPM | SYSTOLIC BLOOD PRESSURE: 162 MMHG | TEMPERATURE: 96.8 F | OXYGEN SATURATION: 97 % | DIASTOLIC BLOOD PRESSURE: 78 MMHG

## 2024-10-29 DIAGNOSIS — C67.8 MALIGNANT NEOPLASM OF OVERLAPPING SITES OF BLADDER (H): Primary | ICD-10-CM

## 2024-10-29 DIAGNOSIS — Z85.51 PERSONAL HISTORY OF MALIGNANT NEOPLASM OF BLADDER: Primary | ICD-10-CM

## 2024-10-29 LAB
ALBUMIN UR-MCNC: NEGATIVE MG/DL
APPEARANCE UR: CLEAR
BILIRUB UR QL STRIP: NEGATIVE
COLOR UR AUTO: ABNORMAL
GLUCOSE UR STRIP-MCNC: NEGATIVE MG/DL
HGB UR QL STRIP: ABNORMAL
KETONES UR STRIP-MCNC: NEGATIVE MG/DL
LEUKOCYTE ESTERASE UR QL STRIP: ABNORMAL
NITRATE UR QL: POSITIVE
PH UR STRIP: 5 [PH] (ref 5–7)
SP GR UR STRIP: 1.01 (ref 1–1.03)
UROBILINOGEN UR STRIP-MCNC: NORMAL MG/DL

## 2024-10-29 PROCEDURE — 81003 URINALYSIS AUTO W/O SCOPE: CPT | Performed by: UROLOGY

## 2024-10-29 RX ORDER — SULFAMETHOXAZOLE AND TRIMETHOPRIM 800; 160 MG/1; MG/1
1 TABLET ORAL 2 TIMES DAILY
Qty: 14 TABLET | Refills: 0 | Status: SHIPPED | OUTPATIENT
Start: 2024-10-29 | End: 2024-11-05

## 2024-10-29 NOTE — PROGRESS NOTES
Infusion Nursing Note:  Mark Shultz presents today for maintenance BCG #3/3 - DEFERRED.    Patient seen by provider today: No   present during visit today: Not Applicable.    Note: Urine positive for nitrates today. Pt asymptomatic. Has been taking Bactrim with instillations as prescribed. Had had indwelling cath for past few months    Per secure chat message with Dr Daniels: HOLD treatment for one week; Bactrim 1 tab BID for seven days.    Discussed with pt - verbalized understanding      Treatment Conditions:  Results reviewed, labs did NOT meet treatment parameters:   UA RESULTS:  Recent Labs   Lab Test 10/29/24  1303 10/15/24  1313 09/23/24  2137 07/10/24  1226 05/06/24  1037   COLOR Light Yellow   < > Light Brown*   < > Yellow   APPEARANCE Clear   < > Cloudy*   < > Clear   URINEGLC Negative   < > Negative   < > Negative   URINEBILI Negative   < > Negative   < > Negative   URINEKETONE Negative   < > Negative   < > Negative   SG 1.014   < > 1.013   < > 1.020   UBLD Small*   < > Large*   < > Negative   URINEPH 5.0   < > 6.0   < > 6.0   PROTEIN Negative   < > 200*   < > Negative   UROBILINOGEN  --   --   --   --  0.2   NITRITE Positive*   < > Negative   < > Negative   LEUKEST Large*   < > Large*   < > Trace*   RBCU  --   --  68*   < >  --    WBCU  --   --  >182*   < >  --     < > = values in this interval not displayed.           Discharge Plan:   Patient and/or family verbalized understanding of discharge instructions and all questions answered.  Copy of AVS reviewed with patient and/or family.  Patient will return 11/6/24 for BCG #3/3   Patient discharged in stable condition accompanied by: wife.  Departure Mode: Ambulatory with walker.    Shannan Henderson, RN, RN

## 2024-11-06 ENCOUNTER — INFUSION THERAPY VISIT (OUTPATIENT)
Dept: INFUSION THERAPY | Facility: CLINIC | Age: 84
End: 2024-11-06
Attending: UROLOGY
Payer: MEDICARE

## 2024-11-06 ENCOUNTER — TELEPHONE (OUTPATIENT)
Dept: UROLOGY | Facility: CLINIC | Age: 84
End: 2024-11-06

## 2024-11-06 VITALS
OXYGEN SATURATION: 96 % | HEART RATE: 61 BPM | SYSTOLIC BLOOD PRESSURE: 133 MMHG | TEMPERATURE: 97.9 F | DIASTOLIC BLOOD PRESSURE: 74 MMHG | RESPIRATION RATE: 18 BRPM

## 2024-11-06 DIAGNOSIS — C67.8 MALIGNANT NEOPLASM OF OVERLAPPING SITES OF BLADDER (H): Primary | ICD-10-CM

## 2024-11-06 LAB
ALBUMIN UR-MCNC: 50 MG/DL
APPEARANCE UR: ABNORMAL
BILIRUB UR QL STRIP: NEGATIVE
COLOR UR AUTO: YELLOW
GLUCOSE UR STRIP-MCNC: NEGATIVE MG/DL
HGB UR QL STRIP: ABNORMAL
KETONES UR STRIP-MCNC: NEGATIVE MG/DL
LEUKOCYTE ESTERASE UR QL STRIP: ABNORMAL
NITRATE UR QL: NEGATIVE
PH UR STRIP: 5.5 [PH] (ref 5–7)
SP GR UR STRIP: 1.01 (ref 1–1.03)
UROBILINOGEN UR STRIP-MCNC: NORMAL MG/DL

## 2024-11-06 PROCEDURE — 250N000009 HC RX 250: Performed by: UROLOGY

## 2024-11-06 PROCEDURE — 81003 URINALYSIS AUTO W/O SCOPE: CPT | Performed by: UROLOGY

## 2024-11-06 PROCEDURE — 250N000011 HC RX IP 250 OP 636: Performed by: UROLOGY

## 2024-11-06 PROCEDURE — 51720 TREATMENT OF BLADDER LESION: CPT

## 2024-11-06 RX ORDER — LIDOCAINE HYDROCHLORIDE 20 MG/ML
10 JELLY TOPICAL
Status: DISCONTINUED | OUTPATIENT
Start: 2024-11-06 | End: 2024-11-06 | Stop reason: HOSPADM

## 2024-11-06 RX ADMIN — BACILLUS CALMETTE-GUERIN 50 MG: 50 POWDER, FOR SUSPENSION INTRAVESICAL at 14:38

## 2024-11-06 RX ADMIN — LIDOCAINE HYDROCHLORIDE 10 ML: 20 JELLY TOPICAL at 14:35

## 2024-11-06 NOTE — PROGRESS NOTES
"Infusion Nursing Note:  Mark Shultz presents today for BCG # 3 of 3 - maintenance.    Patient seen by provider today: No   present during visit today: Not Applicable.    Note: Mark reports feeling well. Denies taking anti-TNF inhibitors.     Mark currently comes in to clinic with indwelling catheter connected to leg bag. He reports he has his catheter exchanged every month, he asked for this to be done today. Catheter was exchanged prior to BCG instillation.     He was wondering if it would be OK to switch back to self cath instead of having the indwelling agarwal in place. In basket sent to Dr Daniels team to follow-up on his questions.    Treatment Conditions:  Results reviewed, labs MET treatment parameters, ok to proceed with treatment.  Urine results today:   Latest Reference Range & Units 11/06/24 13:47   Color Urine Colorless, Straw, Light Yellow, Yellow  Yellow   Appearance Urine Clear  Slightly Cloudy !   Glucose Urine Negative mg/dL Negative   Bilirubin Urine Negative  Negative   Ketones Urine Negative mg/dL Negative   Specific Gravity Urine 1.003 - 1.035  1.014   pH Urine 5.0 - 7.0  5.5   Protein Albumin Urine Negative mg/dL 50 !   Urobilinogen mg/dL Normal, 2.0 mg/dL Normal   Nitrite Urine Negative  Negative   Blood Urine Negative  Large !   Leukocyte Esterase Urine Negative  Moderate !   !: Data is abnormal.    Pre-procedure Assessment:  Dysuria: No  Hematuria: No  Fever/chills: No  Increased urinary urgency: No  Increased urinary frequency: N/A - catheter in place.    Procedure and Post-procedure assessment\"  16F Coude catheter placed.  Lidocaine urojet 2% 10ml used.  Catheter placed without trauma.  Clear/yellow urine drained from bladder.    Patient turned every 15 minutes per protocol: Yes, turning to be completed at home per protocol.   Patient tolerated instillation without incident.  Patient dwelled for 120 minutes.    Patient instructed on the following and verbalized understanding: "   Medication to remain in the bladder for 2 hours post instillation: YES  Post instillation side effects and precautions discussed: YES    Discharge Plan:   Discharge instructions reviewed with: Patient.  Patient and/or family verbalized understanding of discharge instructions and all questions answered.  AVS to patient via [employer].  Patient will return PRN for next appointment.   Patient discharged in stable condition accompanied by: self.  Departure Mode: Ambulatory with walker.    Taiwo Crockett, RN, RN

## 2024-11-06 NOTE — TELEPHONE ENCOUNTER
----- Message from Jeremiah Daniels sent at 11/6/2024  3:02 PM CST -----  Regarding: FW: catheter question  Please see message below and call patient:  He can absolutely go back to self catheterization as he was previously doing thanks  ----- Message -----  From: Taiwo Crockett RN  Sent: 11/6/2024   2:22 PM CST  To: Jeremiah Daniels MD  Subject: catheter question                                Mark Lal just received #3 of maintenance BCG today at [facility]. He was asking if it is OK for him to switch back to self cathing instead of having the indwelling catheter in place.     He was hoping to hear from your team if this would be OK - if someone is able to reach out to him to discuss that would be great!    Taiwo Chamorro RN

## 2024-11-06 NOTE — PATIENT INSTRUCTIONS
Home discharge instructions:  -To make sure each treatment has the best chance of working, follow these steps 2 hours after each treatment   1. Lie on your back for 15 minutes   2. Lie on your left side for 15 minutes   3. Lie on your right side for 15 minutes   4. Lie on your stomach for 15 minutes   5. Get up but keep the medication in your bladder for 60 more minutes- for a total of 2 hours   6. Empty your bladder following the directions below (if you have difficulty holding your bladder it is ok to empty your bladder early)  -Wear pad if incontinence is a possibility  -Wash hands thoroughly after using the bathroom  -For safety reasons remember to follow these directions for 6 hours after each treatment:  (for 6 hours after your empty the BCG from your bladder)   1. Sit on the toilet seat to urinate   2. Immediately after urinating- add 2 cups of undiluted chlorine bleach to the toilet    3. Close lid and let the bleach sit for 15 minutes each time   4. Drink plenty of water to flush any remaining medication out of your bladder    **These steps will help kill all the BCG bacteria and disinfect the toilet**    -No intercourse or sexual activity for 48 hours after each treatment. A barrier (condom) should be used for 6 weeks after the last treatment.     Please drain catheter of BCG at 4:30    Take your antibiotic today at 8:30 pm and tomorrow morning.      Please call your urology team with fevers or chills, burning with urination, or blood in your urine which lasts more than a 48-72 hours or with any question or concerns.

## 2024-11-07 ENCOUNTER — TELEPHONE (OUTPATIENT)
Dept: INTERVENTIONAL RADIOLOGY/VASCULAR | Facility: CLINIC | Age: 84
End: 2024-11-07
Payer: MEDICARE

## 2024-11-07 DIAGNOSIS — G95.9 MYELOPATHY (H): Primary | ICD-10-CM

## 2024-11-07 NOTE — TELEPHONE ENCOUNTER
Left voicemail for patient regarding upcoming lumbar puncture on 11/13. Patient given number to call back. No medication to be held.

## 2024-11-11 ENCOUNTER — ALLIED HEALTH/NURSE VISIT (OUTPATIENT)
Dept: UROLOGY | Facility: CLINIC | Age: 84
End: 2024-11-11
Payer: MEDICARE

## 2024-11-11 DIAGNOSIS — Z79.2 PROPHYLACTIC ANTIBIOTIC: Primary | ICD-10-CM

## 2024-11-11 PROCEDURE — 99207 PR NO CHARGE NURSE ONLY: CPT

## 2024-11-11 RX ORDER — SULFAMETHOXAZOLE AND TRIMETHOPRIM 800; 160 MG/1; MG/1
1 TABLET ORAL ONCE
Qty: 1 TABLET | Refills: 0 | Status: SHIPPED | OUTPATIENT
Start: 2024-11-11 | End: 2024-11-11

## 2024-11-11 NOTE — PROGRESS NOTES
Mark Shultz comes into clinic today at the request of Dr. Daniels, Ordering Provider for Catheter Removal.    Patient's catheter was disconnected from the leg bag and the balloon deflated. The catheter was removed with no complaints offered by the patient and the procedure was tolerated well.      Pt will resume SIC    Pt sent 1 tablet antibiotic to take today    This service provided today was under the supervising provider of the day Dr. Reese, who was available if needed.    Joanne Geronimo

## 2025-01-06 ENCOUNTER — TELEPHONE (OUTPATIENT)
Dept: UROLOGY | Facility: CLINIC | Age: 85
End: 2025-01-06
Payer: MEDICARE

## 2025-01-06 DIAGNOSIS — R33.9 URINARY RETENTION: Primary | ICD-10-CM

## 2025-01-06 DIAGNOSIS — N40.0 ENLARGED PROSTATE: ICD-10-CM

## 2025-01-06 NOTE — TELEPHONE ENCOUNTER
[facility] Call Center    Phone Message    May a detailed message be left on voicemail: yes     Reason for Call: Medication Refill Request    Has the patient contacted the pharmacy for the refill? Yes   Name of medication being requested: catheter supplies  Provider who prescribed the medication: Jeremiah  Pharmacy: [facility]   Date medication is needed: asap     Action Taken: Message routed to:  [facility]    Travel Screening: Not Applicable     Date of Service:

## 2025-01-06 NOTE — TELEPHONE ENCOUNTER
Spoke to patient  he has been doing CIC just once at    and getting 350-400 ml out  PVR. He is having more frequency and urgency  in day time and  wants to increase to  twice  a day ? Is this  OK       He will continue to need a coude tip due to his anatomy  and enlarged prostate  he is unable to pass straight tip catheter into his bladder

## 2025-01-06 NOTE — TELEPHONE ENCOUNTER
[facility] Call Center    Phone Message    May a detailed message be left on voicemail: No    Reason for Call: Patient is returning clinics call. Sending encounter for clinic to return patients call ASAP. Priority line was dialed, but no answer from clinic.Thanks    Action Taken: ELLIE Mcdonald    Travel Screening: Not applicable     Date of Service:  January 6, 2025

## 2025-08-25 ENCOUNTER — HOSPITAL ENCOUNTER (INPATIENT)
Facility: CLINIC | Age: 85
End: 2025-08-25
Attending: EMERGENCY MEDICINE | Admitting: HOSPITALIST
Payer: MEDICARE

## 2025-08-25 PROBLEM — R29.898 WEAKNESS OF BOTH LOWER EXTREMITIES: Status: ACTIVE | Noted: 2025-08-25

## 2025-08-25 ASSESSMENT — ACTIVITIES OF DAILY LIVING (ADL)
ADLS_ACUITY_SCORE: 59

## 2025-08-26 ASSESSMENT — ACTIVITIES OF DAILY LIVING (ADL)
ADLS_ACUITY_SCORE: 67
ADLS_ACUITY_SCORE: 72
ADLS_ACUITY_SCORE: 72
ADLS_ACUITY_SCORE: 67
ADLS_ACUITY_SCORE: 59
ADLS_ACUITY_SCORE: 72
ADLS_ACUITY_SCORE: 67
ADLS_ACUITY_SCORE: 72
ADLS_ACUITY_SCORE: 59
ADLS_ACUITY_SCORE: 67
ADLS_ACUITY_SCORE: 67
ADLS_ACUITY_SCORE: 72
ADLS_ACUITY_SCORE: 65
DEPENDENT_IADLS:: INDEPENDENT
ADLS_ACUITY_SCORE: 67
ADLS_ACUITY_SCORE: 67
ADLS_ACUITY_SCORE: 65
ADLS_ACUITY_SCORE: 72
ADLS_ACUITY_SCORE: 72
ADLS_ACUITY_SCORE: 67

## 2025-08-27 ENCOUNTER — APPOINTMENT (OUTPATIENT)
Dept: MRI IMAGING | Facility: CLINIC | Age: 85
End: 2025-08-27
Attending: PSYCHIATRY & NEUROLOGY
Payer: MEDICARE

## 2025-08-27 PROCEDURE — 72157 MRI CHEST SPINE W/O & W/DYE: CPT

## 2025-08-27 PROCEDURE — 70553 MRI BRAIN STEM W/O & W/DYE: CPT

## 2025-08-27 PROCEDURE — 72156 MRI NECK SPINE W/O & W/DYE: CPT

## 2025-08-27 ASSESSMENT — ACTIVITIES OF DAILY LIVING (ADL)
ADLS_ACUITY_SCORE: 73
ADLS_ACUITY_SCORE: 73
ADLS_ACUITY_SCORE: 74
ADLS_ACUITY_SCORE: 73
ADLS_ACUITY_SCORE: 74
ADLS_ACUITY_SCORE: 73
ADLS_ACUITY_SCORE: 73
ADLS_ACUITY_SCORE: 74
ADLS_ACUITY_SCORE: 73
ADLS_ACUITY_SCORE: 73
ADLS_ACUITY_SCORE: 68
ADLS_ACUITY_SCORE: 73
ADLS_ACUITY_SCORE: 73
ADLS_ACUITY_SCORE: 74
ADLS_ACUITY_SCORE: 73
ADLS_ACUITY_SCORE: 68
ADLS_ACUITY_SCORE: 73
ADLS_ACUITY_SCORE: 72
ADLS_ACUITY_SCORE: 73

## 2025-08-28 ASSESSMENT — ACTIVITIES OF DAILY LIVING (ADL)
ADLS_ACUITY_SCORE: 74

## 2025-08-29 ASSESSMENT — ACTIVITIES OF DAILY LIVING (ADL)
ADLS_ACUITY_SCORE: 70
ADLS_ACUITY_SCORE: 69
ADLS_ACUITY_SCORE: 69
ADLS_ACUITY_SCORE: 70
ADLS_ACUITY_SCORE: 71
ADLS_ACUITY_SCORE: 69
ADLS_ACUITY_SCORE: 71
ADLS_ACUITY_SCORE: 69
ADLS_ACUITY_SCORE: 74
ADLS_ACUITY_SCORE: 70
ADLS_ACUITY_SCORE: 70
ADLS_ACUITY_SCORE: 74
ADLS_ACUITY_SCORE: 69
ADLS_ACUITY_SCORE: 70
ADLS_ACUITY_SCORE: 69
ADLS_ACUITY_SCORE: 70
ADLS_ACUITY_SCORE: 71
ADLS_ACUITY_SCORE: 71
ADLS_ACUITY_SCORE: 74
ADLS_ACUITY_SCORE: 70
ADLS_ACUITY_SCORE: 69
ADLS_ACUITY_SCORE: 71
ADLS_ACUITY_SCORE: 70

## 2025-08-30 ASSESSMENT — ACTIVITIES OF DAILY LIVING (ADL)
ADLS_ACUITY_SCORE: 73
ADLS_ACUITY_SCORE: 73
ADLS_ACUITY_SCORE: 80
ADLS_ACUITY_SCORE: 74
ADLS_ACUITY_SCORE: 73
ADLS_ACUITY_SCORE: 73
ADLS_ACUITY_SCORE: 76
ADLS_ACUITY_SCORE: 81
ADLS_ACUITY_SCORE: 73
ADLS_ACUITY_SCORE: 73
ADLS_ACUITY_SCORE: 80
ADLS_ACUITY_SCORE: 76
ADLS_ACUITY_SCORE: 80
ADLS_ACUITY_SCORE: 73
ADLS_ACUITY_SCORE: 76
ADLS_ACUITY_SCORE: 73
ADLS_ACUITY_SCORE: 80
ADLS_ACUITY_SCORE: 80
ADLS_ACUITY_SCORE: 81
ADLS_ACUITY_SCORE: 81
ADLS_ACUITY_SCORE: 80

## 2025-08-31 ASSESSMENT — ACTIVITIES OF DAILY LIVING (ADL)
ADLS_ACUITY_SCORE: 70
ADLS_ACUITY_SCORE: 80
ADLS_ACUITY_SCORE: 70
ADLS_ACUITY_SCORE: 70
ADLS_ACUITY_SCORE: 80
ADLS_ACUITY_SCORE: 80
ADLS_ACUITY_SCORE: 70
ADLS_ACUITY_SCORE: 78
ADLS_ACUITY_SCORE: 70
ADLS_ACUITY_SCORE: 78
ADLS_ACUITY_SCORE: 74
ADLS_ACUITY_SCORE: 80
ADLS_ACUITY_SCORE: 70
ADLS_ACUITY_SCORE: 80
ADLS_ACUITY_SCORE: 70
ADLS_ACUITY_SCORE: 80
ADLS_ACUITY_SCORE: 80
ADLS_ACUITY_SCORE: 70
ADLS_ACUITY_SCORE: 80
ADLS_ACUITY_SCORE: 70
ADLS_ACUITY_SCORE: 80
ADLS_ACUITY_SCORE: 70
ADLS_ACUITY_SCORE: 70

## 2025-09-01 ASSESSMENT — ACTIVITIES OF DAILY LIVING (ADL)
ADLS_ACUITY_SCORE: 80
ADLS_ACUITY_SCORE: 80
ADLS_ACUITY_SCORE: 82
ADLS_ACUITY_SCORE: 80
ADLS_ACUITY_SCORE: 86
ADLS_ACUITY_SCORE: 86
ADLS_ACUITY_SCORE: 80
ADLS_ACUITY_SCORE: 86
ADLS_ACUITY_SCORE: 82
ADLS_ACUITY_SCORE: 80
ADLS_ACUITY_SCORE: 80
ADLS_ACUITY_SCORE: 82
ADLS_ACUITY_SCORE: 80

## 2025-09-02 ASSESSMENT — ACTIVITIES OF DAILY LIVING (ADL)
ADLS_ACUITY_SCORE: 82
ADLS_ACUITY_SCORE: 86
ADLS_ACUITY_SCORE: 82
ADLS_ACUITY_SCORE: 86
ADLS_ACUITY_SCORE: 82
ADLS_ACUITY_SCORE: 86
ADLS_ACUITY_SCORE: 86
ADLS_ACUITY_SCORE: 82
ADLS_ACUITY_SCORE: 86
ADLS_ACUITY_SCORE: 82
ADLS_ACUITY_SCORE: 82

## 2025-09-03 ASSESSMENT — ACTIVITIES OF DAILY LIVING (ADL)
ADLS_ACUITY_SCORE: 78
ADLS_ACUITY_SCORE: 82
ADLS_ACUITY_SCORE: 78
ADLS_ACUITY_SCORE: 82
ADLS_ACUITY_SCORE: 78
ADLS_ACUITY_SCORE: 82
ADLS_ACUITY_SCORE: 78
ADLS_ACUITY_SCORE: 82
ADLS_ACUITY_SCORE: 78
ADLS_ACUITY_SCORE: 82
ADLS_ACUITY_SCORE: 78
ADLS_ACUITY_SCORE: 82

## 2025-09-04 ASSESSMENT — ACTIVITIES OF DAILY LIVING (ADL)
ADLS_ACUITY_SCORE: 78

## (undated) DEVICE — PACK CYSTO CUSTOM RIDGES

## (undated) DEVICE — PAD CHUX UNDERPAD 30X36" P3036C

## (undated) DEVICE — TUBING IRRIG TUR Y TYPE 96" LF 6543-01

## (undated) DEVICE — ESU ELEC LOOP 24FR 20750G

## (undated) DEVICE — BAG CLEAR TRASH 1.3M 39X33" P4040C

## (undated) DEVICE — LINEN FULL SHEET 5511

## (undated) DEVICE — SOL WATER IRRIG 1000ML BOTTLE 2F7114

## (undated) DEVICE — ESU GROUND PAD ADULT W/CORD E7507

## (undated) DEVICE — DRSG TELFA 2X3"

## (undated) DEVICE — SOL WATER IRRIG 3000ML BAG 2B7117

## (undated) DEVICE — SYR 50ML CATH TIP W/O NDL 309620

## (undated) DEVICE — ESU CORD MONOPOLAR 10'  E0510

## (undated) DEVICE — PREP SCRUB SOL EXIDINE 4% CHG 4OZ 29002-404

## (undated) DEVICE — GLOVE BIOGEL PI MICRO SZ 7.5 48575

## (undated) DEVICE — BAG URINARY DRAIN 4000ML LF 153509

## (undated) DEVICE — LINEN HALF SHEET 5512

## (undated) RX ORDER — FENTANYL CITRATE 50 UG/ML
INJECTION, SOLUTION INTRAMUSCULAR; INTRAVENOUS
Status: DISPENSED
Start: 2024-01-03

## (undated) RX ORDER — CEFAZOLIN SODIUM/WATER 2 G/20 ML
SYRINGE (ML) INTRAVENOUS
Status: DISPENSED
Start: 2024-01-03

## (undated) RX ORDER — EPHEDRINE SULFATE 50 MG/ML
INJECTION, SOLUTION INTRAMUSCULAR; INTRAVENOUS; SUBCUTANEOUS
Status: DISPENSED
Start: 2024-01-03